# Patient Record
Sex: FEMALE | Race: WHITE | NOT HISPANIC OR LATINO | Employment: FULL TIME | URBAN - NONMETROPOLITAN AREA
[De-identification: names, ages, dates, MRNs, and addresses within clinical notes are randomized per-mention and may not be internally consistent; named-entity substitution may affect disease eponyms.]

---

## 2017-03-22 ENCOUNTER — APPOINTMENT (OUTPATIENT)
Dept: GENERAL RADIOLOGY | Facility: HOSPITAL | Age: 47
End: 2017-03-22

## 2017-03-22 ENCOUNTER — APPOINTMENT (OUTPATIENT)
Dept: CT IMAGING | Facility: HOSPITAL | Age: 47
End: 2017-03-22

## 2017-03-22 ENCOUNTER — HOSPITAL ENCOUNTER (EMERGENCY)
Facility: HOSPITAL | Age: 47
Discharge: HOME | End: 2017-03-22
Attending: EMERGENCY MEDICINE | Admitting: FAMILY MEDICINE

## 2017-03-22 VITALS
DIASTOLIC BLOOD PRESSURE: 58 MMHG | HEIGHT: 64 IN | SYSTOLIC BLOOD PRESSURE: 126 MMHG | HEART RATE: 57 BPM | RESPIRATION RATE: 16 BRPM | TEMPERATURE: 97.6 F | WEIGHT: 157.13 LBS | BODY MASS INDEX: 26.82 KG/M2 | OXYGEN SATURATION: 94 %

## 2017-03-22 DIAGNOSIS — K21.00 GASTROESOPHAGEAL REFLUX DISEASE WITH ESOPHAGITIS: ICD-10-CM

## 2017-03-22 DIAGNOSIS — R10.12 LEFT UPPER QUADRANT PAIN: Primary | ICD-10-CM

## 2017-03-22 LAB
ALBUMIN SERPL-MCNC: 4.4 G/DL (ref 3.4–4.8)
ALBUMIN/GLOB SERPL: 1.4 G/DL (ref 1.1–1.8)
ALP SERPL-CCNC: 91 U/L (ref 38–126)
ALT SERPL W P-5'-P-CCNC: 21 U/L (ref 9–52)
ANION GAP SERPL CALCULATED.3IONS-SCNC: 13 MMOL/L (ref 5–15)
AST SERPL-CCNC: 30 U/L (ref 14–36)
BASOPHILS # BLD AUTO: 0.04 10*3/MM3 (ref 0–0.2)
BASOPHILS NFR BLD AUTO: 0.3 % (ref 0–2)
BILIRUB SERPL-MCNC: 0.4 MG/DL (ref 0.2–1.3)
BILIRUB UR QL STRIP: NEGATIVE
BUN BLD-MCNC: 12 MG/DL (ref 7–21)
BUN/CREAT SERPL: 16.7 (ref 7–25)
CALCIUM SPEC-SCNC: 9.8 MG/DL (ref 8.4–10.2)
CHLORIDE SERPL-SCNC: 107 MMOL/L (ref 95–110)
CLARITY UR: CLEAR
CO2 SERPL-SCNC: 22 MMOL/L (ref 22–31)
COLOR UR: YELLOW
CREAT BLD-MCNC: 0.72 MG/DL (ref 0.5–1)
DEPRECATED RDW RBC AUTO: 45.3 FL (ref 36.4–46.3)
EOSINOPHIL # BLD AUTO: 0.29 10*3/MM3 (ref 0–0.7)
EOSINOPHIL NFR BLD AUTO: 2.1 % (ref 0–7)
ERYTHROCYTE [DISTWIDTH] IN BLOOD BY AUTOMATED COUNT: 13.4 % (ref 11.5–14.5)
GFR SERPL CREATININE-BSD FRML MDRD: 88 ML/MIN/1.73 (ref 60–135)
GLOBULIN UR ELPH-MCNC: 3.2 GM/DL (ref 2.3–3.5)
GLUCOSE BLD-MCNC: 99 MG/DL (ref 60–100)
GLUCOSE UR STRIP-MCNC: NEGATIVE MG/DL
HCT VFR BLD AUTO: 46.8 % (ref 35–45)
HGB BLD-MCNC: 16.6 G/DL (ref 12–15.5)
HGB UR QL STRIP.AUTO: NEGATIVE
HOLD SPECIMEN: NORMAL
IMM GRANULOCYTES # BLD: 0.04 10*3/MM3 (ref 0–0.02)
IMM GRANULOCYTES NFR BLD: 0.3 % (ref 0–0.5)
KETONES UR QL STRIP: ABNORMAL
LEUKOCYTE ESTERASE UR QL STRIP.AUTO: NEGATIVE
LIPASE SERPL-CCNC: 94 U/L (ref 23–300)
LYMPHOCYTES # BLD AUTO: 3.76 10*3/MM3 (ref 0.6–4.2)
LYMPHOCYTES NFR BLD AUTO: 27.6 % (ref 10–50)
MCH RBC QN AUTO: 32.6 PG (ref 26.5–34)
MCHC RBC AUTO-ENTMCNC: 35.5 G/DL (ref 31.4–36)
MCV RBC AUTO: 91.9 FL (ref 80–98)
MONOCYTES # BLD AUTO: 0.64 10*3/MM3 (ref 0–0.9)
MONOCYTES NFR BLD AUTO: 4.7 % (ref 0–12)
NEUTROPHILS # BLD AUTO: 8.87 10*3/MM3 (ref 2–8.6)
NEUTROPHILS NFR BLD AUTO: 65 % (ref 37–80)
NITRITE UR QL STRIP: NEGATIVE
PH UR STRIP.AUTO: 7 [PH] (ref 5–9)
PLATELET # BLD AUTO: 254 10*3/MM3 (ref 150–450)
PMV BLD AUTO: 11 FL (ref 8–12)
POTASSIUM BLD-SCNC: 3.8 MMOL/L (ref 3.5–5.1)
PROT SERPL-MCNC: 7.6 G/DL (ref 6.3–8.6)
PROT UR QL STRIP: NEGATIVE
RBC # BLD AUTO: 5.09 10*6/MM3 (ref 3.77–5.16)
SODIUM BLD-SCNC: 142 MMOL/L (ref 137–145)
SP GR UR STRIP: 1.02 (ref 1–1.03)
UROBILINOGEN UR QL STRIP: ABNORMAL
WBC NRBC COR # BLD: 13.64 10*3/MM3 (ref 3.2–9.8)
WHOLE BLOOD HOLD SPECIMEN: NORMAL

## 2017-03-22 PROCEDURE — 74022 RADEX COMPL AQT ABD SERIES: CPT

## 2017-03-22 PROCEDURE — 25010000002 PROMETHAZINE PER 50 MG: Performed by: FAMILY MEDICINE

## 2017-03-22 PROCEDURE — 25010000002 ONDANSETRON PER 1 MG: Performed by: EMERGENCY MEDICINE

## 2017-03-22 PROCEDURE — 36415 COLL VENOUS BLD VENIPUNCTURE: CPT

## 2017-03-22 PROCEDURE — 80053 COMPREHEN METABOLIC PANEL: CPT | Performed by: EMERGENCY MEDICINE

## 2017-03-22 PROCEDURE — 74176 CT ABD & PELVIS W/O CONTRAST: CPT

## 2017-03-22 PROCEDURE — 96376 TX/PRO/DX INJ SAME DRUG ADON: CPT

## 2017-03-22 PROCEDURE — 99284 EMERGENCY DEPT VISIT MOD MDM: CPT

## 2017-03-22 PROCEDURE — 25010000002 HYDROMORPHONE PER 4 MG: Performed by: EMERGENCY MEDICINE

## 2017-03-22 PROCEDURE — 96361 HYDRATE IV INFUSION ADD-ON: CPT

## 2017-03-22 PROCEDURE — 85025 COMPLETE CBC W/AUTO DIFF WBC: CPT | Performed by: EMERGENCY MEDICINE

## 2017-03-22 PROCEDURE — 83690 ASSAY OF LIPASE: CPT | Performed by: EMERGENCY MEDICINE

## 2017-03-22 PROCEDURE — 96375 TX/PRO/DX INJ NEW DRUG ADDON: CPT

## 2017-03-22 PROCEDURE — 25010000002 MORPHINE PER 10 MG: Performed by: FAMILY MEDICINE

## 2017-03-22 PROCEDURE — 81003 URINALYSIS AUTO W/O SCOPE: CPT | Performed by: EMERGENCY MEDICINE

## 2017-03-22 PROCEDURE — 96374 THER/PROPH/DIAG INJ IV PUSH: CPT

## 2017-03-22 RX ORDER — PENTAZOCINE HYDROCHLORIDE AND NALOXONE HYDROCHLORIDE 50; .5 MG/1; MG/1
1 TABLET ORAL EVERY 4 HOURS PRN
COMMUNITY
End: 2017-11-16

## 2017-03-22 RX ORDER — PANTOPRAZOLE SODIUM 20 MG/1
20 TABLET, DELAYED RELEASE ORAL DAILY
COMMUNITY
End: 2020-06-03

## 2017-03-22 RX ORDER — MORPHINE SULFATE 4 MG/ML
4 INJECTION, SOLUTION INTRAMUSCULAR; INTRAVENOUS ONCE
Status: COMPLETED | OUTPATIENT
Start: 2017-03-22 | End: 2017-03-22

## 2017-03-22 RX ORDER — PANTOPRAZOLE SODIUM 40 MG/10ML
40 INJECTION, POWDER, LYOPHILIZED, FOR SOLUTION INTRAVENOUS ONCE
Status: COMPLETED | OUTPATIENT
Start: 2017-03-22 | End: 2017-03-22

## 2017-03-22 RX ORDER — SODIUM CHLORIDE 0.9 % (FLUSH) 0.9 %
10 SYRINGE (ML) INJECTION AS NEEDED
Status: DISCONTINUED | OUTPATIENT
Start: 2017-03-22 | End: 2017-03-22 | Stop reason: HOSPADM

## 2017-03-22 RX ORDER — ONDANSETRON 4 MG/1
4 TABLET, FILM COATED ORAL EVERY 6 HOURS PRN
Qty: 10 TABLET | Refills: 0 | Status: SHIPPED | OUTPATIENT
Start: 2017-03-22

## 2017-03-22 RX ORDER — SODIUM CHLORIDE 9 MG/ML
125 INJECTION, SOLUTION INTRAVENOUS CONTINUOUS
Status: DISCONTINUED | OUTPATIENT
Start: 2017-03-22 | End: 2017-03-22 | Stop reason: HOSPADM

## 2017-03-22 RX ORDER — MAGNESIUM HYDROXIDE/ALUMINUM HYDROXICE/SIMETHICONE 120; 1200; 1200 MG/30ML; MG/30ML; MG/30ML
30 SUSPENSION ORAL ONCE
Status: COMPLETED | OUTPATIENT
Start: 2017-03-22 | End: 2017-03-22

## 2017-03-22 RX ORDER — PROMETHAZINE HYDROCHLORIDE 25 MG/ML
12.5 INJECTION, SOLUTION INTRAMUSCULAR; INTRAVENOUS ONCE
Status: COMPLETED | OUTPATIENT
Start: 2017-03-22 | End: 2017-03-22

## 2017-03-22 RX ORDER — ONDANSETRON 2 MG/ML
4 INJECTION INTRAMUSCULAR; INTRAVENOUS ONCE
Status: COMPLETED | OUTPATIENT
Start: 2017-03-22 | End: 2017-03-22

## 2017-03-22 RX ADMIN — HYDROMORPHONE HYDROCHLORIDE 1 MG: 1 INJECTION, SOLUTION INTRAMUSCULAR; INTRAVENOUS; SUBCUTANEOUS at 04:54

## 2017-03-22 RX ADMIN — MORPHINE SULFATE 4 MG: 4 INJECTION, SOLUTION INTRAMUSCULAR; INTRAVENOUS at 08:22

## 2017-03-22 RX ADMIN — LIDOCAINE HYDROCHLORIDE 15 ML: 20 SOLUTION ORAL; TOPICAL at 08:21

## 2017-03-22 RX ADMIN — PANTOPRAZOLE SODIUM 40 MG: 40 INJECTION, POWDER, FOR SOLUTION INTRAVENOUS at 04:57

## 2017-03-22 RX ADMIN — PROMETHAZINE HYDROCHLORIDE 12.5 MG: 25 INJECTION, SOLUTION INTRAMUSCULAR; INTRAVENOUS at 08:22

## 2017-03-22 RX ADMIN — Medication 10 ML: at 05:00

## 2017-03-22 RX ADMIN — ALUMINUM HYDROXIDE, MAGNESIUM HYDROXIDE, AND SIMETHICONE 30 ML: 200; 200; 20 SUSPENSION ORAL at 08:21

## 2017-03-22 RX ADMIN — ONDANSETRON 4 MG: 2 INJECTION INTRAMUSCULAR; INTRAVENOUS at 04:56

## 2017-03-22 RX ADMIN — HYDROMORPHONE HYDROCHLORIDE 1 MG: 1 INJECTION, SOLUTION INTRAMUSCULAR; INTRAVENOUS; SUBCUTANEOUS at 07:09

## 2017-03-22 RX ADMIN — SODIUM CHLORIDE 125 ML/HR: 9 INJECTION, SOLUTION INTRAVENOUS at 04:52

## 2017-03-22 NOTE — ED PROVIDER NOTES
Subjective   History of Present Illness    Review of Systems    Past Medical History:   Diagnosis Date   • Abnormal granulation tissue    • Acute bacterial pharyngitis    • Acute tonsillitis, unspecified    • Allergic asthma     IgE-mediated allergic asthma      • Allergic rhinitis due to pollen    • Anxiety    • Asthma    • Back pain    • Backache    • Breast lump    • Cellulitis of trunk     Postoperative   • Chronic pain    • Corns and callus    • Cough    • Depressive disorder    • Diarrhea    • Dysphagia    • Dysuria    • Endometriosis    • Family history of malignant neoplasm of gastrointestinal tract    • GERD (gastroesophageal reflux disease)    • Hirsutism    • Hypertension    • Irritable bowel syndrome    • Leukorrhea, not specified as infective    • Mastitis    • Migraine    • Sebaceous cyst    • Shortness of breath    • Tick bite    • Tobacco user    • Upper respiratory infection        Allergies   Allergen Reactions   • Naproxen Rash   • Penicillins Rash   • Sulfur Rash   • Tetracyclines & Related Rash       Past Surgical History:   Procedure Laterality Date   • DIAGNOSTIC LAPAROSCOPY  04/08/2008    Laparosc diagnostic (2)      • ENDOSCOPY  03/26/2013    Colon endoscopy 36808 (2)      • ENDOSCOPY W/ PEG TUBE PLACEMENT  03/26/2013    EGD w/ tube 10251 (2)      • EXCISION LESION  01/30/2013    EXC TR-EXT Benign+Irina 0.6-1 CM 61823 (1)      • HAND TENOLYSIS  07/23/1992    Hand/finger surgery (1)      • HEAD & NECK SKIN LESION EXCISIONAL BIOPSY  01/31/1994    Biopsy of Skin 76509 (2)      • INCISION AND DRAINAGE BREAST ABSCESS  12/03/2012    Anesth, surgery of breast (1)      • INJECTION OF MEDICATION  09/12/2011    B12 (1)      • INJECTION OF MEDICATION  04/28/2015    Celestone (betamethasone) (1)     • INJECTION OF MEDICATION  08/17/2015    Depo Medrol (Methylprednisone) (6)      • INJECTION OF MEDICATION  06/08/2012    Kenalog (3)      • INJECTION OF MEDICATION  06/08/2012    Toradol (1)      •  NASOLACRIMAL DUCT PROBING  08/16/1973    Probe nasolacrimal duct (1)      • NEPHROSTOMY TRACT DILATATION W/ LITHOTRIPSY     • PAP SMEAR  03/20/2008    PAP SMEAR (1)      • TOTAL ABDOMINAL HYSTERECTOMY WITH SALPINGO OOPHORECTOMY  04/09/2012    ERLIN/BSO (1)          Family History   Problem Relation Age of Onset   • Cancer Other      other   • Colon cancer Other      Colorectal Cancer   • Diabetes Other    • Heart disease Other    • Hypertension Other    • Lung cancer Other        Social History     Social History   • Marital status:      Spouse name: N/A   • Number of children: N/A   • Years of education: N/A     Social History Main Topics   • Smoking status: Current Every Day Smoker     Packs/day: 1.00   • Smokeless tobacco: None   • Alcohol use Yes      Comment: Pt states she drinks less than once a month   • Drug use: No   • Sexual activity: Not Asked     Other Topics Concern   • None     Social History Narrative   • None           Objective   Physical Exam    Procedures         ED Course  ED Course   Comment By Time   Checkout Dr. Jain. Pending CT abd/pelvis. Júnior Blanchard MD 03/22 7208   Request # : [id number] Siddharth Jain MD 03/22 1131          Labs Reviewed   URINALYSIS W/ CULTURE IF INDICATED - Abnormal; Notable for the following:        Result Value    Ketones, UA Trace (*)     All other components within normal limits    Narrative:     Urine microscopic not indicated.   CBC WITH AUTO DIFFERENTIAL - Abnormal; Notable for the following:     WBC 13.64 (*)     Hemoglobin 16.6 (*)     Hematocrit 46.8 (*)     Neutrophils, Absolute 8.87 (*)     Immature Grans, Absolute 0.04 (*)     All other components within normal limits   COMPREHENSIVE METABOLIC PANEL - Normal   LIPASE - Normal   RAINBOW DRAW    Narrative:     The following orders were created for panel order [place] Draw.  Procedure                               Abnormality         Status                     ---------                                -----------         ------                     Light Blue Top[[id number]]                                    Final result               Gold Top - SST[[id number]]                                    Final result                 Please view results for these tests on the individual orders.   CBC AND DIFFERENTIAL    Narrative:     The following orders were created for panel order CBC & Differential.  Procedure                               Abnormality         Status                     ---------                               -----------         ------                     CBC Auto Differential[[id number]]         Abnormal            Final result                 Please view results for these tests on the individual orders.   LIGHT BLUE TOP   GOLD TOP - SST       CT Abdomen Pelvis Without Contrast   Final Result   CONCLUSION: Significant amount of retained oral contrast in the   esophagus. This suggests esophageal pathology such as reflux.      Small nonobstructing calculus upper pole left kidney. Kidneys   otherwise unremarkable.      Evidence of prior cholecystectomy, and hysterectomy. CT abdomen,   pelvis without contrast is otherwise unremarkable.          Electronically signed by:  Ruben Freeman MD  3/22/2017 9:21 AM CDT   Workstation: [id number]      XR Abdomen 2 View With Chest 1 View   Final Result   No obvious acute abnormality.      Electronically signed by:  Wagner Pham MD  3/22/2017 5:23 AM   CDT Workstation: [id number]                    MDM    Final diagnoses:   Left upper quadrant pain   Gastroesophageal reflux disease with esophagitis            Siddharth Jain MD  03/22/17 3056

## 2017-03-22 NOTE — ED NOTES
Late entry: Pt states she has left sided abdominal pain that has been going on for 3 hours. Pt also mentioned that she has a contract with her doctor concerning pain medication.     Franco Kulkarni, UMANG  03/22/17 9821

## 2017-03-22 NOTE — ED NOTES
Pt not currently in pain and was sleeping upon entering the room to be discharged. Pt's spouse is the  home.      Denisha Gu RN  03/22/17 8894

## 2017-03-22 NOTE — ED PROVIDER NOTES
Subjective   HPI Comments: 48yo female presents ED c/o onset LUQ abdominal pain 2400hrs    Patient is a 47 y.o. female presenting with abdominal pain.   Abdominal Pain   Pain location:  LUQ and L flank  Pain quality: sharp    Pain severity:  Moderate  Onset quality:  Sudden  Duration:  6 hours  Timing:  Constant  Chronicity:  New  Relieved by:  Nothing  Worsened by:  Position changes  Ineffective treatments:  None tried  Associated symptoms: constipation and nausea    Associated symptoms: no cough, no dysuria, no fever, no hematemesis, no hematochezia, no hematuria, no melena and no vomiting        Review of Systems   Constitutional: Negative for fever.   Respiratory: Negative for cough.    Gastrointestinal: Positive for abdominal pain, constipation and nausea. Negative for hematemesis, hematochezia, melena and vomiting.   Genitourinary: Negative for dysuria and hematuria.       Past Medical History:   Diagnosis Date   • Abnormal granulation tissue    • Acute bacterial pharyngitis    • Acute tonsillitis, unspecified    • Allergic asthma     IgE-mediated allergic asthma      • Allergic rhinitis due to pollen    • Anxiety    • Asthma    • Back pain    • Backache    • Breast lump    • Cellulitis of trunk     Postoperative   • Chronic pain    • Corns and callus    • Cough    • Depressive disorder    • Diarrhea    • Dysphagia    • Dysuria    • Endometriosis    • Family history of malignant neoplasm of gastrointestinal tract    • GERD (gastroesophageal reflux disease)    • Hirsutism    • Hypertension    • Irritable bowel syndrome    • Leukorrhea, not specified as infective    • Mastitis    • Migraine    • Sebaceous cyst    • Shortness of breath    • Tick bite    • Tobacco user    • Upper respiratory infection        Allergies   Allergen Reactions   • Naproxen Rash   • Penicillins Rash   • Sulfur Rash   • Tetracyclines & Related Rash       Past Surgical History:   Procedure Laterality Date   • DIAGNOSTIC LAPAROSCOPY   04/08/2008    Laparosc diagnostic (2)      • ENDOSCOPY  03/26/2013    Colon endoscopy 01411 (2)      • ENDOSCOPY W/ PEG TUBE PLACEMENT  03/26/2013    EGD w/ tube 09540 (2)      • EXCISION LESION  01/30/2013    EXC TR-EXT Benign+Irina 0.6-1 CM 97911 (1)      • HAND TENOLYSIS  07/23/1992    Hand/finger surgery (1)      • HEAD & NECK SKIN LESION EXCISIONAL BIOPSY  01/31/1994    Biopsy of Skin 70480 (2)      • INCISION AND DRAINAGE BREAST ABSCESS  12/03/2012    Anesth, surgery of breast (1)      • INJECTION OF MEDICATION  09/12/2011    B12 (1)      • INJECTION OF MEDICATION  04/28/2015    Celestone (betamethasone) (1)     • INJECTION OF MEDICATION  08/17/2015    Depo Medrol (Methylprednisone) (6)      • INJECTION OF MEDICATION  06/08/2012    Kenalog (3)      • INJECTION OF MEDICATION  06/08/2012    Toradol (1)      • NASOLACRIMAL DUCT PROBING  08/16/1973    Probe nasolacrimal duct (1)      • NEPHROSTOMY TRACT DILATATION W/ LITHOTRIPSY     • PAP SMEAR  03/20/2008    PAP SMEAR (1)      • TOTAL ABDOMINAL HYSTERECTOMY WITH SALPINGO OOPHORECTOMY  04/09/2012    ERLIN/BSO (1)          Family History   Problem Relation Age of Onset   • Cancer Other      other   • Colon cancer Other      Colorectal Cancer   • Diabetes Other    • Heart disease Other    • Hypertension Other    • Lung cancer Other        Social History     Social History   • Marital status:      Spouse name: N/A   • Number of children: N/A   • Years of education: N/A     Social History Main Topics   • Smoking status: Current Every Day Smoker     Packs/day: 1.00   • Smokeless tobacco: None   • Alcohol use Yes      Comment: Pt states she drinks less than once a month   • Drug use: No   • Sexual activity: Not Asked     Other Topics Concern   • None     Social History Narrative   • None           Objective   Physical Exam   Constitutional: She appears well-developed and well-nourished.   HENT:   Head: Normocephalic and atraumatic.   Mouth/Throat: Oropharynx is  clear and moist.   Eyes: Pupils are equal, round, and reactive to light.   Neck: Neck supple. No JVD present. No tracheal deviation present.   Cardiovascular: Normal rate, regular rhythm, normal heart sounds and intact distal pulses.  Exam reveals no gallop and no friction rub.    No murmur heard.  Pulmonary/Chest: Effort normal and breath sounds normal. She has no wheezes. She has no rales.   Abdominal: Soft. Normal appearance and bowel sounds are normal. There is no hepatosplenomegaly. There is tenderness in the left upper quadrant. There is no rigidity, no rebound, no guarding, no CVA tenderness, no tenderness at McBurney's point and negative Mike's sign.   Genitourinary: Rectal exam shows guaiac negative stool.   Lymphadenopathy:     She has no cervical adenopathy.   Skin: Skin is warm and dry.   Nursing note and vitals reviewed.      Procedures         ED Course  ED Course                  MDM    Final diagnoses:   None            Júnior Blanchard MD  03/22/17 0608

## 2017-06-11 ENCOUNTER — HOSPITAL ENCOUNTER (OUTPATIENT)
Facility: HOSPITAL | Age: 47
Setting detail: OBSERVATION
Discharge: HOME | End: 2017-06-13
Attending: FAMILY MEDICINE | Admitting: INTERNAL MEDICINE

## 2017-06-11 ENCOUNTER — APPOINTMENT (OUTPATIENT)
Dept: GENERAL RADIOLOGY | Facility: HOSPITAL | Age: 47
End: 2017-06-11

## 2017-06-11 ENCOUNTER — APPOINTMENT (OUTPATIENT)
Dept: CT IMAGING | Facility: HOSPITAL | Age: 47
End: 2017-06-11

## 2017-06-11 DIAGNOSIS — R51.9 ACUTE INTRACTABLE HEADACHE, UNSPECIFIED HEADACHE TYPE: Primary | ICD-10-CM

## 2017-06-11 DIAGNOSIS — I10 ESSENTIAL HYPERTENSION: ICD-10-CM

## 2017-06-11 LAB
ALBUMIN SERPL-MCNC: 4.1 G/DL (ref 3.4–4.8)
ALBUMIN/GLOB SERPL: 1.2 G/DL (ref 1.1–1.8)
ALP SERPL-CCNC: 70 U/L (ref 38–126)
ALT SERPL W P-5'-P-CCNC: 32 U/L (ref 9–52)
AMPHET+METHAMPHET UR QL: NEGATIVE
ANION GAP SERPL CALCULATED.3IONS-SCNC: 11 MMOL/L (ref 5–15)
ANION GAP SERPL CALCULATED.3IONS-SCNC: 13 MMOL/L (ref 5–15)
AST SERPL-CCNC: 15 U/L (ref 14–36)
BARBITURATES UR QL SCN: NEGATIVE
BASOPHILS # BLD AUTO: 0.03 10*3/MM3 (ref 0–0.2)
BASOPHILS NFR BLD AUTO: 0.3 % (ref 0–2)
BENZODIAZ UR QL SCN: NEGATIVE
BILIRUB SERPL-MCNC: 0.4 MG/DL (ref 0.2–1.3)
BILIRUB UR QL STRIP: NEGATIVE
BUN BLD-MCNC: 9 MG/DL (ref 7–21)
BUN BLD-MCNC: 9 MG/DL (ref 7–21)
BUN/CREAT SERPL: 12.7 (ref 7–25)
BUN/CREAT SERPL: 13 (ref 7–25)
CALCIUM SPEC-SCNC: 9.3 MG/DL (ref 8.4–10.2)
CALCIUM SPEC-SCNC: 9.4 MG/DL (ref 8.4–10.2)
CANNABINOIDS SERPL QL: NEGATIVE
CHLORIDE SERPL-SCNC: 108 MMOL/L (ref 95–110)
CHLORIDE SERPL-SCNC: 108 MMOL/L (ref 95–110)
CK MB SERPL-CCNC: 1.1 NG/ML (ref 0–5)
CK SERPL-CCNC: 58 U/L (ref 30–135)
CLARITY UR: ABNORMAL
CO2 SERPL-SCNC: 20 MMOL/L (ref 22–31)
CO2 SERPL-SCNC: 20 MMOL/L (ref 22–31)
COCAINE UR QL: NEGATIVE
COLOR UR: YELLOW
CREAT BLD-MCNC: 0.69 MG/DL (ref 0.5–1)
CREAT BLD-MCNC: 0.71 MG/DL (ref 0.5–1)
DEPRECATED RDW RBC AUTO: 48.1 FL (ref 36.4–46.3)
EOSINOPHIL # BLD AUTO: 0.25 10*3/MM3 (ref 0–0.7)
EOSINOPHIL NFR BLD AUTO: 2.2 % (ref 0–7)
ERYTHROCYTE [DISTWIDTH] IN BLOOD BY AUTOMATED COUNT: 14.1 % (ref 11.5–14.5)
GFR SERPL CREATININE-BSD FRML MDRD: 89 ML/MIN/1.73 (ref 58–135)
GFR SERPL CREATININE-BSD FRML MDRD: 92 ML/MIN/1.73 (ref 60–135)
GLOBULIN UR ELPH-MCNC: 3.3 GM/DL (ref 2.3–3.5)
GLUCOSE BLD-MCNC: 128 MG/DL (ref 60–100)
GLUCOSE BLD-MCNC: 98 MG/DL (ref 60–100)
GLUCOSE UR STRIP-MCNC: NEGATIVE MG/DL
HCT VFR BLD AUTO: 45.8 % (ref 35–45)
HGB BLD-MCNC: 15.7 G/DL (ref 12–15.5)
HGB UR QL STRIP.AUTO: NEGATIVE
HOLD SPECIMEN: NORMAL
HOLD SPECIMEN: NORMAL
IMM GRANULOCYTES # BLD: 0.03 10*3/MM3 (ref 0–0.02)
IMM GRANULOCYTES NFR BLD: 0.3 % (ref 0–0.5)
KETONES UR QL STRIP: NEGATIVE
LEUKOCYTE ESTERASE UR QL STRIP.AUTO: NEGATIVE
LYMPHOCYTES # BLD AUTO: 4.7 10*3/MM3 (ref 0.6–4.2)
LYMPHOCYTES NFR BLD AUTO: 41.9 % (ref 10–50)
MCH RBC QN AUTO: 32 PG (ref 26.5–34)
MCHC RBC AUTO-ENTMCNC: 34.3 G/DL (ref 31.4–36)
MCV RBC AUTO: 93.3 FL (ref 80–98)
METHADONE UR QL SCN: NEGATIVE
MONOCYTES # BLD AUTO: 0.63 10*3/MM3 (ref 0–0.9)
MONOCYTES NFR BLD AUTO: 5.6 % (ref 0–12)
NEUTROPHILS # BLD AUTO: 5.58 10*3/MM3 (ref 2–8.6)
NEUTROPHILS NFR BLD AUTO: 49.7 % (ref 37–80)
NITRITE UR QL STRIP: NEGATIVE
NRBC BLD MANUAL-RTO: 0 /100 WBC (ref 0–0)
OPIATES UR QL: POSITIVE
OXYCODONE UR QL SCN: NEGATIVE
PH UR STRIP.AUTO: 8 [PH] (ref 5–9)
PLATELET # BLD AUTO: 241 10*3/MM3 (ref 150–450)
PMV BLD AUTO: 10.7 FL (ref 8–12)
POTASSIUM BLD-SCNC: 3.8 MMOL/L (ref 3.5–5.1)
POTASSIUM BLD-SCNC: 4.1 MMOL/L (ref 3.5–5.1)
PROT SERPL-MCNC: 7.4 G/DL (ref 6.3–8.6)
PROT UR QL STRIP: NEGATIVE
RBC # BLD AUTO: 4.91 10*6/MM3 (ref 3.77–5.16)
SODIUM BLD-SCNC: 139 MMOL/L (ref 137–145)
SODIUM BLD-SCNC: 141 MMOL/L (ref 137–145)
SP GR UR STRIP: 1.01 (ref 1–1.03)
TROPONIN I SERPL-MCNC: <0.012 NG/ML
UROBILINOGEN UR QL STRIP: ABNORMAL
WBC NRBC COR # BLD: 11.22 10*3/MM3 (ref 3.2–9.8)
WHOLE BLOOD HOLD SPECIMEN: NORMAL
WHOLE BLOOD HOLD SPECIMEN: NORMAL

## 2017-06-11 PROCEDURE — 80307 DRUG TEST PRSMV CHEM ANLYZR: CPT | Performed by: NURSE PRACTITIONER

## 2017-06-11 PROCEDURE — 99284 EMERGENCY DEPT VISIT MOD MDM: CPT

## 2017-06-11 PROCEDURE — 80053 COMPREHEN METABOLIC PANEL: CPT | Performed by: NURSE PRACTITIONER

## 2017-06-11 PROCEDURE — 93005 ELECTROCARDIOGRAM TRACING: CPT | Performed by: NURSE PRACTITIONER

## 2017-06-11 PROCEDURE — 71010 HC CHEST PA OR AP: CPT

## 2017-06-11 PROCEDURE — 81003 URINALYSIS AUTO W/O SCOPE: CPT | Performed by: FAMILY MEDICINE

## 2017-06-11 PROCEDURE — 87040 BLOOD CULTURE FOR BACTERIA: CPT | Performed by: NURSE PRACTITIONER

## 2017-06-11 PROCEDURE — 85025 COMPLETE CBC W/AUTO DIFF WBC: CPT | Performed by: NURSE PRACTITIONER

## 2017-06-11 PROCEDURE — G0378 HOSPITAL OBSERVATION PER HR: HCPCS

## 2017-06-11 PROCEDURE — 93010 ELECTROCARDIOGRAM REPORT: CPT | Performed by: INTERNAL MEDICINE

## 2017-06-11 PROCEDURE — 84484 ASSAY OF TROPONIN QUANT: CPT | Performed by: NURSE PRACTITIONER

## 2017-06-11 PROCEDURE — 82550 ASSAY OF CK (CPK): CPT | Performed by: NURSE PRACTITIONER

## 2017-06-11 PROCEDURE — 80048 BASIC METABOLIC PNL TOTAL CA: CPT | Performed by: NURSE PRACTITIONER

## 2017-06-11 PROCEDURE — 70450 CT HEAD/BRAIN W/O DYE: CPT

## 2017-06-11 PROCEDURE — 82553 CREATINE MB FRACTION: CPT | Performed by: NURSE PRACTITIONER

## 2017-06-11 RX ORDER — ASPIRIN 81 MG/1
81 TABLET, CHEWABLE ORAL DAILY
Status: DISCONTINUED | OUTPATIENT
Start: 2017-06-11 | End: 2017-06-13 | Stop reason: HOSPADM

## 2017-06-11 RX ORDER — HYDROCODONE BITARTRATE AND ACETAMINOPHEN 10; 325 MG/1; MG/1
1 TABLET ORAL ONCE
Status: COMPLETED | OUTPATIENT
Start: 2017-06-11 | End: 2017-06-11

## 2017-06-11 RX ORDER — SODIUM CHLORIDE 0.9 % (FLUSH) 0.9 %
1-10 SYRINGE (ML) INJECTION AS NEEDED
Status: DISCONTINUED | OUTPATIENT
Start: 2017-06-11 | End: 2017-06-13 | Stop reason: HOSPADM

## 2017-06-11 RX ORDER — ACETAMINOPHEN 325 MG/1
650 TABLET ORAL EVERY 4 HOURS PRN
Status: DISCONTINUED | OUTPATIENT
Start: 2017-06-11 | End: 2017-06-13 | Stop reason: HOSPADM

## 2017-06-11 RX ORDER — ACETAMINOPHEN 650 MG/1
650 SUPPOSITORY RECTAL EVERY 4 HOURS PRN
Status: DISCONTINUED | OUTPATIENT
Start: 2017-06-11 | End: 2017-06-13 | Stop reason: HOSPADM

## 2017-06-11 RX ORDER — ATORVASTATIN CALCIUM 40 MG/1
80 TABLET, FILM COATED ORAL NIGHTLY
Status: DISCONTINUED | OUTPATIENT
Start: 2017-06-11 | End: 2017-06-13 | Stop reason: HOSPADM

## 2017-06-11 RX ORDER — NICOTINE 21 MG/24HR
1 PATCH, TRANSDERMAL 24 HOURS TRANSDERMAL EVERY 24 HOURS
Status: DISCONTINUED | OUTPATIENT
Start: 2017-06-11 | End: 2017-06-13 | Stop reason: HOSPADM

## 2017-06-11 RX ORDER — ONDANSETRON 4 MG/1
4 TABLET, FILM COATED ORAL EVERY 6 HOURS PRN
Status: DISCONTINUED | OUTPATIENT
Start: 2017-06-11 | End: 2017-06-13 | Stop reason: HOSPADM

## 2017-06-11 RX ORDER — ASPIRIN 300 MG/1
300 SUPPOSITORY RECTAL DAILY
Status: DISCONTINUED | OUTPATIENT
Start: 2017-06-11 | End: 2017-06-12

## 2017-06-11 RX ORDER — SODIUM CHLORIDE 0.9 % (FLUSH) 0.9 %
10 SYRINGE (ML) INJECTION AS NEEDED
Status: DISCONTINUED | OUTPATIENT
Start: 2017-06-11 | End: 2017-06-13 | Stop reason: HOSPADM

## 2017-06-11 RX ORDER — ACETAMINOPHEN 325 MG/1
650 TABLET ORAL EVERY 4 HOURS PRN
Status: DISCONTINUED | OUTPATIENT
Start: 2017-06-11 | End: 2017-06-12

## 2017-06-11 RX ADMIN — ATORVASTATIN CALCIUM 80 MG: 40 TABLET, FILM COATED ORAL at 23:11

## 2017-06-11 RX ADMIN — HYDROCODONE BITARTRATE AND ACETAMINOPHEN 1 TABLET: 10; 325 TABLET ORAL at 19:12

## 2017-06-11 RX ADMIN — ASPIRIN 81 MG 81 MG: 81 TABLET ORAL at 23:11

## 2017-06-11 RX ADMIN — NICOTINE 1 PATCH: 21 PATCH TRANSDERMAL at 23:11

## 2017-06-11 RX ADMIN — ONDANSETRON 4 MG: 4 TABLET, FILM COATED ORAL at 23:18

## 2017-06-11 NOTE — ED PROVIDER NOTES
Subjective   HPI Comments: Pt reports to ED c/o left side neck pain that radiates to her head, her  reports that yesterday she c/o of headache and her blood pressure fluctuating. She went to bed last night normal but hypertensive. Today she was seen at urgent care and sent to ED. Spouse at bedside reports that she is slow to answer questions but they are accurate. For example she normally can quickly recall her med list and today she is slow to answer.     Patient is a 47 y.o. female presenting with headaches.   Headache   Pain location:  L parietal and L temporal  Radiates to:  L neck  Severity currently:  7/10  Onset quality:  Gradual  Timing:  Constant  Progression:  Partially resolved  Chronicity:  Recurrent  Similar to prior headaches: no    Associated symptoms: neck pain    Associated symptoms: no ear pain, no fatigue, no fever, no sinus pressure and no sore throat        Review of Systems   Constitutional: Negative.  Negative for chills, fatigue and fever.   HENT: Negative for drooling, ear pain, sinus pressure, sore throat, tinnitus and trouble swallowing.    Respiratory: Negative for chest tightness, shortness of breath and wheezing.    Cardiovascular: Negative for chest pain and palpitations.   Gastrointestinal: Negative.    Endocrine: Negative.    Genitourinary: Negative.    Musculoskeletal: Positive for neck pain. Negative for arthralgias and joint swelling.   Neurological: Positive for headaches.   Hematological: Negative.    Psychiatric/Behavioral: Positive for decreased concentration. Negative for confusion, hallucinations, self-injury and sleep disturbance. The patient is not nervous/anxious.    All other systems reviewed and are negative.      Past Medical History:   Diagnosis Date   • Abnormal granulation tissue    • Acute bacterial pharyngitis    • Acute tonsillitis, unspecified    • Allergic asthma     IgE-mediated allergic asthma      • Allergic rhinitis due to pollen    • Anxiety    •  Asthma    • Back pain    • Backache    • Breast lump    • Cellulitis of trunk     Postoperative   • Chronic pain    • Corns and callus    • Cough    • Depressive disorder    • Diarrhea    • Dysphagia    • Dysuria    • Endometriosis    • Family history of malignant neoplasm of gastrointestinal tract    • GERD (gastroesophageal reflux disease)    • Hirsutism    • Hypertension    • Irritable bowel syndrome    • Leukorrhea, not specified as infective    • Mastitis    • Migraine    • Sebaceous cyst    • Shortness of breath    • Tick bite    • Tobacco user    • Upper respiratory infection        Allergies   Allergen Reactions   • Contrast Dye Hives   • Naproxen Rash   • Penicillins Rash   • Sulfur Rash   • Tetracyclines & Related Rash       Past Surgical History:   Procedure Laterality Date   • DIAGNOSTIC LAPAROSCOPY  04/08/2008    Laparosc diagnostic (2)      • ENDOSCOPY  03/26/2013    Colon endoscopy 26654 (2)      • ENDOSCOPY W/ PEG TUBE PLACEMENT  03/26/2013    EGD w/ tube 86064 (2)      • EXCISION LESION  01/30/2013    EXC TR-EXT Benign+Irina 0.6-1 CM 00444 (1)      • HAND TENOLYSIS  07/23/1992    Hand/finger surgery (1)      • HEAD & NECK SKIN LESION EXCISIONAL BIOPSY  01/31/1994    Biopsy of Skin 34571 (2)      • INCISION AND DRAINAGE BREAST ABSCESS  12/03/2012    Anesth, surgery of breast (1)      • INJECTION OF MEDICATION  09/12/2011    B12 (1)      • INJECTION OF MEDICATION  04/28/2015    Celestone (betamethasone) (1)     • INJECTION OF MEDICATION  08/17/2015    Depo Medrol (Methylprednisone) (6)      • INJECTION OF MEDICATION  06/08/2012    Kenalog (3)      • INJECTION OF MEDICATION  06/08/2012    Toradol (1)      • NASOLACRIMAL DUCT PROBING  08/16/1973    Probe nasolacrimal duct (1)      • NEPHROSTOMY TRACT DILATATION W/ LITHOTRIPSY     • PAP SMEAR  03/20/2008    PAP SMEAR (1)      • TOTAL ABDOMINAL HYSTERECTOMY WITH SALPINGO OOPHORECTOMY  04/09/2012    ERLIN/BSO (1)          Family History   Problem Relation Age  of Onset   • Cancer Other      other   • Colon cancer Other      Colorectal Cancer   • Diabetes Other    • Heart disease Other    • Hypertension Other    • Lung cancer Other        Social History     Social History   • Marital status:      Spouse name: N/A   • Number of children: N/A   • Years of education: N/A     Social History Main Topics   • Smoking status: Current Every Day Smoker     Packs/day: 1.00   • Smokeless tobacco: None   • Alcohol use Yes      Comment: Pt states she drinks less than once a month   • Drug use: No   • Sexual activity: Not Asked     Other Topics Concern   • None     Social History Narrative           Objective   Physical Exam   Constitutional: She is oriented to person, place, and time. Vital signs are normal. She appears well-developed and well-nourished.   HENT:   Head: Normocephalic.   Eyes: Lids are normal. Pupils are equal, round, and reactive to light.   Neck: Trachea normal and normal range of motion.   Cardiovascular: Normal rate, regular rhythm, S1 normal, S2 normal and normal heart sounds.    Pulmonary/Chest: Effort normal and breath sounds normal.   Abdominal: Soft. Normal appearance.   Musculoskeletal: Normal range of motion.   Neurological: She is alert and oriented to person, place, and time. She has normal strength. GCS eye subscore is 4. GCS verbal subscore is 5. GCS motor subscore is 6.   Skin: Skin is warm, dry and intact.   Nursing note and vitals reviewed.      Procedures       .  Results for orders placed or performed during the hospital encounter of 06/11/17   Comprehensive Metabolic Panel   Result Value Ref Range    Glucose 128 (H) 60 - 100 mg/dL    BUN 9 7 - 21 mg/dL    Creatinine 0.69 0.50 - 1.00 mg/dL    Sodium 139 137 - 145 mmol/L    Potassium 3.8 3.5 - 5.1 mmol/L    Chloride 108 95 - 110 mmol/L    CO2 20.0 (L) 22.0 - 31.0 mmol/L    Calcium 9.4 8.4 - 10.2 mg/dL    Total Protein 7.4 6.3 - 8.6 g/dL    Albumin 4.10 3.40 - 4.80 g/dL    ALT (SGPT) 32 9 - 52  U/L    AST (SGOT) 15 14 - 36 U/L    Alkaline Phosphatase 70 38 - 126 U/L    Total Bilirubin 0.4 0.2 - 1.3 mg/dL    eGFR Non African Amer 92 >60 mL/min/1.73    Globulin 3.3 2.3 - 3.5 gm/dL    A/G Ratio 1.2 1.1 - 1.8 g/dL    BUN/Creatinine Ratio 13.0 7.0 - 25.0    Anion Gap 11.0 5.0 - 15.0 mmol/L   Troponin   Result Value Ref Range    Troponin I <0.012 <=0.034 ng/mL   CK   Result Value Ref Range    Creatine Kinase 58 30 - 135 U/L   CK-MB   Result Value Ref Range    CKMB 1.10 0.00 - 5.00 ng/mL   CBC Auto Differential   Result Value Ref Range    WBC 11.22 (H) 3.20 - 9.80 10*3/mm3    RBC 4.91 3.77 - 5.16 10*6/mm3    Hemoglobin 15.7 (H) 12.0 - 15.5 g/dL    Hematocrit 45.8 (H) 35.0 - 45.0 %    MCV 93.3 80.0 - 98.0 fL    MCH 32.0 26.5 - 34.0 pg    MCHC 34.3 31.4 - 36.0 g/dL    RDW 14.1 11.5 - 14.5 %    RDW-SD 48.1 (H) 36.4 - 46.3 fl    MPV 10.7 8.0 - 12.0 fL    Platelets 241 150 - 450 10*3/mm3    Neutrophil % 49.7 37.0 - 80.0 %    Lymphocyte % 41.9 10.0 - 50.0 %    Monocyte % 5.6 0.0 - 12.0 %    Eosinophil % 2.2 0.0 - 7.0 %    Basophil % 0.3 0.0 - 2.0 %    Immature Grans % 0.3 0.0 - 0.5 %    Neutrophils, Absolute 5.58 2.00 - 8.60 10*3/mm3    Lymphocytes, Absolute 4.70 (H) 0.60 - 4.20 10*3/mm3    Monocytes, Absolute 0.63 0.00 - 0.90 10*3/mm3    Eosinophils, Absolute 0.25 0.00 - 0.70 10*3/mm3    Basophils, Absolute 0.03 0.00 - 0.20 10*3/mm3    Immature Grans, Absolute 0.03 (H) 0.00 - 0.02 10*3/mm3    nRBC 0.0 0.0 - 0.0 /100 WBC   Urinalysis With / Culture If Indicated   Result Value Ref Range    Color, UA Yellow Yellow, Straw, Dark Yellow, [place]    Appearance, UA Cloudy (A) Clear    pH, UA 8.0 5.0 - 9.0    Specific Gravity, UA 1.008 1.003 - 1.030    Glucose, UA Negative Negative    Ketones, UA Negative Negative    Bilirubin, UA Negative Negative    Blood, UA Negative Negative    Protein, UA Negative Negative    Leuk Esterase, UA Negative Negative    Nitrite, UA Negative Negative    Urobilinogen, UA 0.2 E.U./dL 0.2 - 1.0  E.U./dL   Urine Drug Screen   Result Value Ref Range    Amphetamine Screen, Urine Negative Negative    Barbiturates Screen, Urine Negative Negative    Benzodiazepine Screen, Urine Negative Negative    Cocaine Screen, Urine Negative Negative    Methadone Screen, Urine Negative Negative    Opiate Screen Positive (A) Negative    Oxycodone Screen, Urine Negative Negative    THC, Screen, Urine Negative Negative   Light Blue Top   Result Value Ref Range    Extra Tube hold for add-on    Green Top (Gel)   Result Value Ref Range    Extra Tube Hold for add-ons.    Lavender Top   Result Value Ref Range    Extra Tube hold for add-on    Gold Top - SST   Result Value Ref Range    Extra Tube Hold for add-ons.        ED Course  ED Course    ..Contacted Hospitalist and agreed upon admission. Orders placed. Patient informed              MDM    Final diagnoses:   Acute intractable headache, unspecified headache type   Essential hypertension            Chencho Dhillon, ALISSON  06/11/17 1933       Chencho Dhillon, ALISSON  06/11/17 1946

## 2017-06-12 ENCOUNTER — APPOINTMENT (OUTPATIENT)
Dept: CARDIOLOGY | Facility: HOSPITAL | Age: 47
End: 2017-06-12

## 2017-06-12 ENCOUNTER — APPOINTMENT (OUTPATIENT)
Dept: MRI IMAGING | Facility: HOSPITAL | Age: 47
End: 2017-06-12

## 2017-06-12 ENCOUNTER — APPOINTMENT (OUTPATIENT)
Dept: ULTRASOUND IMAGING | Facility: HOSPITAL | Age: 47
End: 2017-06-12

## 2017-06-12 LAB
ANION GAP SERPL CALCULATED.3IONS-SCNC: 6 MMOL/L (ref 5–15)
ARTICHOKE IGE QN: 139 MG/DL (ref 1–129)
BASOPHILS # BLD AUTO: 0.03 10*3/MM3 (ref 0–0.2)
BASOPHILS NFR BLD AUTO: 0.3 % (ref 0–2)
BH CV ECHO MEAS - ACS: 1.8 CM
BH CV ECHO MEAS - AO ISTHMUS: 2.1 CM
BH CV ECHO MEAS - AO MAX PG (FULL): 2 MMHG
BH CV ECHO MEAS - AO MAX PG: 6.7 MMHG
BH CV ECHO MEAS - AO MEAN PG (FULL): 1.8 MMHG
BH CV ECHO MEAS - AO MEAN PG: 4 MMHG
BH CV ECHO MEAS - AO ROOT AREA (BSA CORRECTED): 1.6
BH CV ECHO MEAS - AO ROOT AREA: 5.4 CM^2
BH CV ECHO MEAS - AO ROOT DIAM: 2.6 CM
BH CV ECHO MEAS - AO V2 MAX: 129.2 CM/SEC
BH CV ECHO MEAS - AO V2 MEAN: 95.2 CM/SEC
BH CV ECHO MEAS - AO V2 VTI: 25.8 CM
BH CV ECHO MEAS - ASC AORTA: 2.4 CM
BH CV ECHO MEAS - AVA(I,A): 2.2 CM^2
BH CV ECHO MEAS - AVA(I,D): 2.2 CM^2
BH CV ECHO MEAS - AVA(V,A): 2.3 CM^2
BH CV ECHO MEAS - AVA(V,D): 2.3 CM^2
BH CV ECHO MEAS - BSA(HAYCOCK): 1.7 M^2
BH CV ECHO MEAS - BSA: 1.7 M^2
BH CV ECHO MEAS - BZI_BMI: 25.2 KILOGRAMS/M^2
BH CV ECHO MEAS - BZI_METRIC_HEIGHT: 160 CM
BH CV ECHO MEAS - BZI_METRIC_WEIGHT: 64.4 KG
BH CV ECHO MEAS - EDV(CUBED): 61.6 ML
BH CV ECHO MEAS - EDV(TEICH): 67.9 ML
BH CV ECHO MEAS - EF(CUBED): 82.9 %
BH CV ECHO MEAS - EF(MOD-SP4): 65 %
BH CV ECHO MEAS - EF(TEICH): 76.4 %
BH CV ECHO MEAS - ESV(CUBED): 10.5 ML
BH CV ECHO MEAS - ESV(TEICH): 16 ML
BH CV ECHO MEAS - FS: 44.5 %
BH CV ECHO MEAS - IVS/LVPW: 1.5
BH CV ECHO MEAS - IVSD: 0.93 CM
BH CV ECHO MEAS - LA DIMENSION: 2.5 CM
BH CV ECHO MEAS - LA/AO: 0.95
BH CV ECHO MEAS - LV MASS(C)D: 88.9 GRAMS
BH CV ECHO MEAS - LV MASS(C)DI: 53.2 GRAMS/M^2
BH CV ECHO MEAS - LV MAX PG: 4.7 MMHG
BH CV ECHO MEAS - LV MEAN PG: 2.2 MMHG
BH CV ECHO MEAS - LV V1 MAX: 108.3 CM/SEC
BH CV ECHO MEAS - LV V1 MEAN: 68.1 CM/SEC
BH CV ECHO MEAS - LV V1 VTI: 20.6 CM
BH CV ECHO MEAS - LVIDD: 3.9 CM
BH CV ECHO MEAS - LVIDS: 2.2 CM
BH CV ECHO MEAS - LVOT AREA (M): 2.8 CM^2
BH CV ECHO MEAS - LVOT AREA: 2.8 CM^2
BH CV ECHO MEAS - LVOT DIAM: 1.9 CM
BH CV ECHO MEAS - LVPWD: 0.64 CM
BH CV ECHO MEAS - MR MAX PG: 18.9 MMHG
BH CV ECHO MEAS - MR MAX VEL: 217.2 CM/SEC
BH CV ECHO MEAS - MV A MAX VEL: 78.1 CM/SEC
BH CV ECHO MEAS - MV DEC SLOPE: 367 CM/SEC^2
BH CV ECHO MEAS - MV E MAX VEL: 95.1 CM/SEC
BH CV ECHO MEAS - MV E/A: 1.2
BH CV ECHO MEAS - MV MAX PG: 4.6 MMHG
BH CV ECHO MEAS - MV MEAN PG: 1.7 MMHG
BH CV ECHO MEAS - MV P1/2T MAX VEL: 106 CM/SEC
BH CV ECHO MEAS - MV P1/2T: 84.6 MSEC
BH CV ECHO MEAS - MV V2 MAX: 106.9 CM/SEC
BH CV ECHO MEAS - MV V2 MEAN: 60.5 CM/SEC
BH CV ECHO MEAS - MV V2 VTI: 30.9 CM
BH CV ECHO MEAS - MVA P1/2T LCG: 2.1 CM^2
BH CV ECHO MEAS - MVA(P1/2T): 2.6 CM^2
BH CV ECHO MEAS - MVA(VTI): 1.9 CM^2
BH CV ECHO MEAS - PA MAX PG: 3.3 MMHG
BH CV ECHO MEAS - PA V2 MAX: 90.3 CM/SEC
BH CV ECHO MEAS - PI END-D VEL: 50.4 CM/SEC
BH CV ECHO MEAS - RVDD: 2.6 CM
BH CV ECHO MEAS - SI(AO): 83.5 ML/M^2
BH CV ECHO MEAS - SI(CUBED): 30.5 ML/M^2
BH CV ECHO MEAS - SI(LVOT): 34.6 ML/M^2
BH CV ECHO MEAS - SI(TEICH): 31 ML/M^2
BH CV ECHO MEAS - SV(AO): 139.7 ML
BH CV ECHO MEAS - SV(CUBED): 51.1 ML
BH CV ECHO MEAS - SV(LVOT): 57.8 ML
BH CV ECHO MEAS - SV(TEICH): 51.9 ML
BH CV ECHO MEAS - TR MAX VEL: 167.2 CM/SEC
BUN BLD-MCNC: 12 MG/DL (ref 7–21)
BUN/CREAT SERPL: 15.6 (ref 7–25)
CALCIUM SPEC-SCNC: 9.6 MG/DL (ref 8.4–10.2)
CHLORIDE SERPL-SCNC: 110 MMOL/L (ref 95–110)
CHOLEST SERPL-MCNC: 201 MG/DL (ref 0–199)
CO2 SERPL-SCNC: 25 MMOL/L (ref 22–31)
CREAT BLD-MCNC: 0.77 MG/DL (ref 0.5–1)
DEPRECATED RDW RBC AUTO: 49.8 FL (ref 36.4–46.3)
EOSINOPHIL # BLD AUTO: 0.19 10*3/MM3 (ref 0–0.7)
EOSINOPHIL NFR BLD AUTO: 2 % (ref 0–7)
ERYTHROCYTE [DISTWIDTH] IN BLOOD BY AUTOMATED COUNT: 14.4 % (ref 11.5–14.5)
GFR SERPL CREATININE-BSD FRML MDRD: 81 ML/MIN/1.73 (ref 60–135)
GLUCOSE BLD-MCNC: 98 MG/DL (ref 60–100)
HCT VFR BLD AUTO: 46.7 % (ref 35–45)
HDLC SERPL-MCNC: 37 MG/DL (ref 60–200)
HGB BLD-MCNC: 15.4 G/DL (ref 12–15.5)
IMM GRANULOCYTES # BLD: 0.01 10*3/MM3 (ref 0–0.02)
IMM GRANULOCYTES NFR BLD: 0.1 % (ref 0–0.5)
LDLC/HDLC SERPL: 3.02 {RATIO} (ref 0–3.22)
LV EF 2D ECHO EST: 60 %
LYMPHOCYTES # BLD AUTO: 4.86 10*3/MM3 (ref 0.6–4.2)
LYMPHOCYTES NFR BLD AUTO: 50.1 % (ref 10–50)
MCH RBC QN AUTO: 31.3 PG (ref 26.5–34)
MCHC RBC AUTO-ENTMCNC: 33 G/DL (ref 31.4–36)
MCV RBC AUTO: 94.9 FL (ref 80–98)
MONOCYTES # BLD AUTO: 0.61 10*3/MM3 (ref 0–0.9)
MONOCYTES NFR BLD AUTO: 6.3 % (ref 0–12)
NEUTROPHILS # BLD AUTO: 4.01 10*3/MM3 (ref 2–8.6)
NEUTROPHILS NFR BLD AUTO: 41.2 % (ref 37–80)
PLATELET # BLD AUTO: 246 10*3/MM3 (ref 150–450)
PMV BLD AUTO: 11.1 FL (ref 8–12)
POTASSIUM BLD-SCNC: 3.9 MMOL/L (ref 3.5–5.1)
RBC # BLD AUTO: 4.92 10*6/MM3 (ref 3.77–5.16)
SODIUM BLD-SCNC: 141 MMOL/L (ref 137–145)
TRIGL SERPL-MCNC: 262 MG/DL (ref 20–199)
WBC NRBC COR # BLD: 9.71 10*3/MM3 (ref 3.2–9.8)

## 2017-06-12 PROCEDURE — G0378 HOSPITAL OBSERVATION PER HR: HCPCS

## 2017-06-12 PROCEDURE — 25010000002 GADOTERIDOL PER 1 ML: Performed by: INTERNAL MEDICINE

## 2017-06-12 PROCEDURE — 93306 TTE W/DOPPLER COMPLETE: CPT | Performed by: INTERNAL MEDICINE

## 2017-06-12 PROCEDURE — A9576 INJ PROHANCE MULTIPACK: HCPCS | Performed by: INTERNAL MEDICINE

## 2017-06-12 PROCEDURE — 85025 COMPLETE CBC W/AUTO DIFF WBC: CPT | Performed by: NURSE PRACTITIONER

## 2017-06-12 PROCEDURE — 70549 MR ANGIOGRAPH NECK W/O&W/DYE: CPT

## 2017-06-12 PROCEDURE — 80061 LIPID PANEL: CPT | Performed by: NURSE PRACTITIONER

## 2017-06-12 PROCEDURE — 25010000002 PROCHLORPERAZINE EDISYLATE PER 10 MG: Performed by: NURSE PRACTITIONER

## 2017-06-12 PROCEDURE — 70553 MRI BRAIN STEM W/O & W/DYE: CPT

## 2017-06-12 PROCEDURE — 25010000002 DIPHENHYDRAMINE PER 50 MG: Performed by: NURSE PRACTITIONER

## 2017-06-12 PROCEDURE — 93880 EXTRACRANIAL BILAT STUDY: CPT

## 2017-06-12 PROCEDURE — 93306 TTE W/DOPPLER COMPLETE: CPT

## 2017-06-12 PROCEDURE — 80048 BASIC METABOLIC PNL TOTAL CA: CPT | Performed by: NURSE PRACTITIONER

## 2017-06-12 PROCEDURE — 96375 TX/PRO/DX INJ NEW DRUG ADDON: CPT

## 2017-06-12 PROCEDURE — 96374 THER/PROPH/DIAG INJ IV PUSH: CPT

## 2017-06-12 RX ORDER — DIPHENHYDRAMINE HYDROCHLORIDE 50 MG/ML
25 INJECTION INTRAMUSCULAR; INTRAVENOUS EVERY 6 HOURS PRN
Status: DISCONTINUED | OUTPATIENT
Start: 2017-06-12 | End: 2017-06-13 | Stop reason: HOSPADM

## 2017-06-12 RX ORDER — HYDROCODONE BITARTRATE AND ACETAMINOPHEN 7.5; 325 MG/1; MG/1
1 TABLET ORAL EVERY 6 HOURS PRN
Status: DISCONTINUED | OUTPATIENT
Start: 2017-06-12 | End: 2017-06-13 | Stop reason: HOSPADM

## 2017-06-12 RX ADMIN — HYDROCODONE BITARTRATE AND ACETAMINOPHEN 1 TABLET: 7.5; 325 TABLET ORAL at 12:13

## 2017-06-12 RX ADMIN — HYDROCODONE BITARTRATE AND ACETAMINOPHEN 1 TABLET: 7.5; 325 TABLET ORAL at 05:25

## 2017-06-12 RX ADMIN — ASPIRIN 81 MG 81 MG: 81 TABLET ORAL at 10:25

## 2017-06-12 RX ADMIN — DIPHENHYDRAMINE HYDROCHLORIDE 25 MG: 50 INJECTION INTRAMUSCULAR; INTRAVENOUS at 14:46

## 2017-06-12 RX ADMIN — NICOTINE 1 PATCH: 21 PATCH TRANSDERMAL at 18:22

## 2017-06-12 RX ADMIN — GADOTERIDOL 15 ML: 279.3 INJECTION, SOLUTION INTRAVENOUS at 08:20

## 2017-06-12 RX ADMIN — Medication 10 ML: at 15:56

## 2017-06-12 RX ADMIN — ATORVASTATIN CALCIUM 80 MG: 40 TABLET, FILM COATED ORAL at 21:20

## 2017-06-12 RX ADMIN — HYDROCODONE BITARTRATE AND ACETAMINOPHEN 1 TABLET: 7.5; 325 TABLET ORAL at 18:20

## 2017-06-12 RX ADMIN — PROCHLORPERAZINE EDISYLATE 10 MG: 5 INJECTION INTRAMUSCULAR; INTRAVENOUS at 15:55

## 2017-06-12 NOTE — PROGRESS NOTES
[facility] Medicine Services  INPATIENT PROGRESS NOTE    Length of Stay: 0  Date of Admission: 6/11/2017  Primary Care Physician: Nick Neal MD    Subjective   Chief Complaint: Headache and dizziness    HPI:  This is a 47-year-old lady that presented to the emergency room yesterday after reporting to the urgent care center earlier during the day for headache.  Ultrasound of the carotid arteries was negative.   MRI of the brain showed bilateral frontal lobe deep white matter small high signal lesions on flair sequence with these lesions nonenhancing most likely representing small foci of chronic ischemic gliosis secondary to microvascular disease versus sequela from migraines versus less likely vasculitic disorder or demyelinating process.  A consult was put in with neurology SOC.  The MRI was reviewed and the neurologist felt this is a chronic problem due to migraines.  A recommendation of Compazine, Benadryl and Toradol was given.  Due to the patient being allergic to Toradol, Compazine and Benadryl was given.  The patient states she is very hesitant to be discharged because she is extremely dizzy.  However, nursing staff indicates that the patient has left the floor on numerous occasions.  I requested that the patient stay on the observation floor due to the dizziness, and we could observe her 1 more night while taken the Compazine and Benadryl and will most likely discharge the patient in the morning.     Review of Systems   Constitutional: Negative.    Eyes: Negative.    Respiratory: Negative.    Cardiovascular: Negative.    Gastrointestinal: Negative.    Endocrine: Negative.    Genitourinary: Negative.    Musculoskeletal: Negative.    Skin: Negative.    Allergic/Immunologic: Negative.    Neurological: Positive for dizziness and headaches.   Hematological: Negative.    Psychiatric/Behavioral: Negative.         All pertinent negatives and positives are as  above. All other systems have been reviewed and are negative unless otherwise stated.     Objective    Temp:  [97.2 °F (36.2 °C)-97.7 °F (36.5 °C)] 97.6 °F (36.4 °C)  Heart Rate:  [62-74] 62  Resp:  [16-18] 16  BP: (121-176)/(81-97) 139/81    Physical Exam   Constitutional: She is oriented to person, place, and time. She appears well-developed and well-nourished.   HENT:   Head: Normocephalic and atraumatic.   Eyes: EOM are normal. Pupils are equal, round, and reactive to light.   Neck: Normal range of motion. Neck supple.   Cardiovascular: Normal rate and regular rhythm.    Pulmonary/Chest: Effort normal and breath sounds normal.   Abdominal: Soft. Bowel sounds are normal.   Musculoskeletal: Normal range of motion.   Neurological: She is alert and oriented to person, place, and time.   Skin: Skin is warm and dry.   Psychiatric: She has a normal mood and affect.     Results Review:  I have reviewed the labs, radiology results, and diagnostic studies.    Laboratory Data:     Results from last 7 days  Lab Units 06/12/17  0515 06/11/17  1944 06/11/17  1539   SODIUM mmol/L 141 141 139   POTASSIUM mmol/L 3.9 4.1 3.8   CHLORIDE mmol/L 110 108 108   TOTAL CO2 mmol/L 25.0 20.0* 20.0*   BUN mg/dL 12 9 9   CREATININE mg/dL 0.77 0.71 0.69   GLUCOSE mg/dL 98 98 128*   CALCIUM mg/dL 9.6 9.3 9.4   BILIRUBIN mg/dL  --   --  0.4   ALK PHOS U/L  --   --  70   ALT (SGPT) U/L  --   --  32   AST (SGOT) U/L  --   --  15   ANION GAP mmol/L 6.0 13.0 11.0     Estimated Creatinine Clearance: 84.5 mL/min (by C-G formula based on Cr of 0.77).            Results from last 7 days  Lab Units 06/12/17  0515 06/11/17  1539   WBC 10*3/mm3 9.71 11.22*   HEMOGLOBIN g/dL 15.4 15.7*   HEMATOCRIT % 46.7* 45.8*   PLATELETS 10*3/mm3 246 241           Culture Data:   Blood Culture   Date Value Ref Range Status   06/11/2017 No growth at less than 24 hours  Preliminary   06/11/2017 No growth at less than 24 hours  Preliminary     No results found for:  URINECX  No results found for: RESPCX  No results found for: WOUNDCX  No results found for: STOOLCX  No components found for: BODYFLD    Radiology Data:   Imaging Results (last 24 hours)     Procedure Component Value Units Date/Time    XR Chest 1 View [[id number]] Collected:  06/11/17 1607     Updated:  06/11/17 1625    Narrative:       Patient Name:  RAMBO COTTER  Patient ID:  [id number]   Ordering:  GILES NOONAN  Attending:  ANNE HUTCHINSON  Referring:  GILES NOONAN  ------------------------------------------------    PORTABLE CHEST    HISTORY: Hypertension. Dizziness.    Portable AP upright film of the chest was obtained at 3:48 PM.  COMPARISON: October 3, 2015.    EKG leads in place.  The lungs are clear of an acute process.  The heart is not enlarged.  The pulmonary vasculature is not increased.  No pleural effusion.  No pneumothorax.  No acute osseous abnormality.      Impression:       CONCLUSION:  No Acute Disease    [id number]    Electronically signed by:  Joseph Lua MD  6/11/2017 4:25 PM CDT  Workstation: [employer]    CT Head Without Contrast [[id number]] Collected:  06/11/17 1655     Updated:  06/11/17 1709    Narrative:       Patient Name:  RAMBO COTTER  Patient ID:  [id number]   Ordering:  GILES NOONAN  Attending:  ANNE HUTCHINSON  Referring:  GILES NOONAN  ------------------------------------------------    CT Head Without Contrast    History: Headache    Axial scans of the brain were obtained without intravenous  contrast.  Coronal and sagital reconstructions were preformed.    This exam was performed according to our departmental  dose-optimization program, which includes automated exposure  control, adjustment of the mA and/or kV according to patient size  and/or use of iterative reconstruction technique.    DLP: 907.40    Comparison: August 14, 2015.    Bone windows are unremarkable.  The visualized paranasal sinuses are unremarkable.    No hemorrhage.  No  mass.  No abnormal areas of increased or decreased attenuation.  No midline shift.  No abnormal extra-axial fluid collections.      Impression:       CONCLUSION:  Normal nonenhanced CT of the brain.    [id number]    Electronically signed by:  Joseph Lua MD  6/11/2017 5:09 PM CDT  Workstation: [id number]    MRI Angiogram Neck With & Without Contrast [[id number]] Collected:  06/12/17 0725     Updated:  06/12/17 0936    Narrative:       Procedure: MRI angiogram neck with contrast.    Reason for exam: Headache, essential primary hypertension, TIA  versus CVA.    FINDINGS: Multiplanar multisequence MR imaging of the carotid  system with contrast with 3-D carotid arterial system imaging.  Motion during exam limits study somewhat.    The left vertebral artery is normal. The right vertebral artery  is normal. The left subclavian artery is normal. The left common  carotid artery is normal. The left internal carotid artery is  normal. The left external carotid artery is normal. The  brachiocephalic trunk is normal. The right subclavian artery is  normal. The right common carotid artery is normal. The right  internal carotid artery is normal. The right external carotid  artery is normal.      Impression:       Normal MR angiogram study of the carotid arterial  systems.    Electronically signed by:  Gamaliel Decker MD  6/12/2017 9:36 AM CDT  Workstation: [id number]    MRI Brain With & Without Contrast [[id number]] Collected:  06/12/17 0724     Updated:  06/12/17 0943    Narrative:       Procedure: MR brain with and without contrast    Reason for exam: TIA versus CVA, headache, essential primary  hypertension.    FINDINGS: Multisequence multiplanar MR imaging of the brain was  performed with and without contrast. The diffusion weighted  series appears normal with no abnormal signal seen to suggest  restricted diffusion. Right frontal lobe deep white matter 2  small circular foci of increased signal on FLAIR sequence are  seen with  these regions appearing mildly lower signal on T1  weighting and nonenhancing. The largest focus measures 5.5 mm in  greatest diameter. Left frontal lobe subcortical deep white  matter 2 mm circular focus of increased signal on FLAIR weighted  sequences seen. Otherwise cerebral and cerebellar parenchymal are  normal. There is no abnormal focus of enhancement seen.  Ventricular system and subarachnoid spaces are normal. The  pituitary is normal.      Impression:       1.  No acute intracranial abnormality.  2.  Bilateral frontal lobe deep white matter small high signal  lesions on FLAIR sequence with these lesions nonenhancing. These  lesions most likely represent small foci of chronic ischemic  gliosis secondary to microvascular disease versus sequela from  migraines versus less likely vasculitic disorder or demyelinating  process. Recommend clinical correlation.    Electronically signed by:  Gamaliel Decker MD  6/12/2017 9:43 AM CDT  Workstation: [id number]     Carotid Bilateral [[id number]] Collected:  06/12/17 0908     Updated:  06/12/17 1013    Narrative:       PROCEDURE: Bilateral duplex carotid and vertebral ultrasound.    INDICATION: Dizziness with near syncope. Headache. Essential  hypertension.    COMPARISON: MRA neck with contrast 6/12/2017.    RIGHT CAROTID BIFURCATION FINDINGS.    Peak systolic velocities in centimeters per second. Right CCA 78,  , . Right ICA/CCA peak systolic velocity ratio 1.3.    Real-time imaging demonstrates no plaque or stenosis in the right  carotid bifurcation arteries.    LEFT CAROTID BIFURCATION FINDINGS.    Peak systolic velocities in centimeters per second. Left CCA 92  and left . Left ICA/CCA peak systolic velocity ratio 1.2.    Real-time imaging demonstrates no plaques or stenosis in the left  carotid bifurcation arteries.    VERTEBRAL ARTERIES. There is antegrade flow through both  vertebral arteries.    Incidental finding of a solid 1.3 cm nodule  "mildly isoechoic  along the posterior margin of the right lobe thyroid midpole  area.      Impression:       Negative bilateral duplex carotid ultrasound with no  plaques or stenosis.    Antegrade flow through both vertebral arteries.    1.3 cm solid isoechoic nodule right lobe thyroid.    [id number]      Electronically signed by:  Edward Kauffman MD  6/12/2017 9:49  AM CDT Workstation: [employer]          I have reviewed the patient current medications.     Assessment/Plan     Plan:      Assessment/plan:  Migraine:  Patient is complaining of dizziness and the feeling of being \"unable to answer questions\" . CT of head is negative for any acute abnormalities.  Ultrasound of the carotid arteries was negative.    MRI of the brain revealed:   Bilateral frontal lobe deep white matter small high signal  lesions on FLAIR sequence with these lesions nonenhancing. These  lesions most likely represent small foci of chronic ischemic  gliosis secondary to microvascular disease versus sequela from  migraines versus less likely vasculitic disorder or demyelinating  process. Recommend clinical correlation.    Neurology SOC was consulted.  Recommendation of starting the patient on Compazine, Toradol, and Benadryl was given. Patient is allergic to Toradol.  Pain management clinic was also suggested.    The patient states she feels she is unready for discharge due to the dizziness.  We will observe the patient overnight and plan on discharge in the AM with compazine and benadryl, and a follow up with Dr. Neal with suggestion of pain management clinic for migraines.            Discharge Planning: I expect patient to be discharged to home in 1 day.      This document has been electronically signed by ALISSON Limon on June 12, 2017 4:19 PM      I personally evaluated and examined the patient in conjunction with KUSH cole and agree with the assessment, treatment plan, and disposition of the patient as recorded.   " Lungs are clear

## 2017-06-12 NOTE — PLAN OF CARE
Problem: Patient Care Overview (Adult)  Goal: Plan of Care Review  Outcome: Ongoing (interventions implemented as appropriate)    06/12/17 0511   Coping/Psychosocial Response Interventions   Plan Of Care Reviewed With patient   Patient Care Overview   Progress improving   Outcome Evaluation   Outcome Summary/Follow up Plan Pt complained of pain upon arrival but slept during the night with no complaints. No acute signs of distress. Will continue to monitor. Pt to have labs, US, and MRI done today.        Goal: Adult Individualization and Mutuality  Outcome: Ongoing (interventions implemented as appropriate)  Goal: Discharge Needs Assessment  Outcome: Ongoing (interventions implemented as appropriate)    Problem: Pain, Acute (Adult)  Goal: Identify Related Risk Factors and Signs and Symptoms  Outcome: Ongoing (interventions implemented as appropriate)  Goal: Acceptable Pain Control/Comfort Level  Outcome: Ongoing (interventions implemented as appropriate)

## 2017-06-12 NOTE — PLAN OF CARE
"Problem: Patient Care Overview (Adult)  Goal: Plan of Care Review  Outcome: Ongoing (interventions implemented as appropriate)    06/12/17 3345   Coping/Psychosocial Response Interventions   Plan Of Care Reviewed With patient;spouse   Patient Care Overview   Progress no change   Outcome Evaluation   Outcome Summary/Follow up Plan Pt states she is having acute headache that \"will not go away\". Pt left unit multiple times to go downstairs for a cigarette per pt after being asked by staff to stay on unit. No acute s/s of distress, Vitals stable and wnl, pt able to rest well throughout the day.        Goal: Adult Individualization and Mutuality  Outcome: Ongoing (interventions implemented as appropriate)  Goal: Discharge Needs Assessment  Outcome: Ongoing (interventions implemented as appropriate)    Problem: Pain, Acute (Adult)  Goal: Identify Related Risk Factors and Signs and Symptoms  Outcome: Ongoing (interventions implemented as appropriate)  Goal: Acceptable Pain Control/Comfort Level  Outcome: Ongoing (interventions implemented as appropriate)      "

## 2017-06-12 NOTE — H&P
"      [facility] Medicine Admission      Date of Admission: 2017      Primary Care Physician: Nick Neal MD      Chief Complaint: Headache    HPI: 47-year-old  female who presented to the emergency department today after reporting to urgent care earlier during the day.  The patient is complaining of headache and feeling \"as if the blood flow on the left side of her neck cannot get into her brain.\"  Patient reports that she started having symptoms yesterday while she was in [employer] grocery shopping.  Her  reports that she called him and said that she was \"sick and confused\".  The patient's  reports that she appears to have intermittent confusion and inability to finish her sentences at times.  The patient reports that she was started on a new medication for her thyroid this past Tuesday, however the patient is unsure with the medication was and does not have her medication with her.  During today's visit in the emergency department she is alert and oriented to person, place, time, and situation but she is still reporting having a headache with blurred vision and confusion despite being able to tell staff appropriate answers to all questions.    Concurrent Medical History: Past medical history includes asthma, chronic back pain with visits to pain management, depression, endometriosis, irritable bowel syndrome, gastroesophageal reflux disease, chronic migraines.    Past Surgical History: Total abdominal hysterectomy, tear duct repair, fracture repair to left hand, cholecystectomy    Family History: Family history is significant for hypertension in the patient's mother who is still living.  Patient's father is  related to lung cancer.  She has one brother with a history of type 2 diabetes and hypertension.    Social History: Patient is a smoker of at least one pack per day for the past 30 years.  She denies alcohol or illicit drug " use.    Allergies:   Allergies   Allergen Reactions   • Contrast Dye Hives   • Naproxen Rash   • Penicillins Rash   • Sulfur Rash   • Tetracyclines & Related Rash       Medications: Scheduled Meds:  aspirin 81 mg Oral Daily   Or      aspirin 300 mg Rectal Daily   atorvastatin 80 mg Oral Nightly   nicotine 1 patch Transdermal Q24H     Continuous Infusions:   PRN Meds:.•  acetaminophen **OR** acetaminophen  •  acetaminophen  •  ondansetron  •  sodium chloride  •  sodium chloride    Review of Systems:  Review of Systems   Constitutional: Negative for chills and fever.   Respiratory: Negative for chest tightness, shortness of breath and wheezing.    Cardiovascular: Negative for chest pain, palpitations and leg swelling.   Gastrointestinal: Negative for abdominal pain, constipation, diarrhea, nausea and vomiting.   Musculoskeletal: Positive for back pain. Negative for neck pain.   Skin: Negative for pallor.   Neurological: Positive for dizziness, weakness and headaches. Negative for seizures, syncope and numbness.   Psychiatric/Behavioral: Positive for confusion.      Otherwise complete ROS is negative except as mentioned above.    Physical Exam:   Temp:  [98.2 °F (36.8 °C)-98.3 °F (36.8 °C)] 98.2 °F (36.8 °C)  Heart Rate:  [62-88] 70  Resp:  [16] 16  BP: (138-176)/(84-95) 162/92  Physical Exam   Constitutional: She is oriented to person, place, and time. She appears well-developed and well-nourished.   HENT:   Head: Normocephalic.   Eyes: Conjunctivae and EOM are normal. Pupils are equal, round, and reactive to light.   Neck: Normal range of motion. Neck supple.   Cardiovascular: Normal rate, regular rhythm, normal heart sounds and intact distal pulses.    Pulmonary/Chest: Effort normal and breath sounds normal. No respiratory distress. She has no wheezes.   Abdominal: Soft. Bowel sounds are normal. She exhibits no distension. There is no tenderness.   Musculoskeletal: Normal range of motion. She exhibits no edema.    Neurological: She is alert and oriented to person, place, and time. She has normal strength. No sensory deficit. GCS eye subscore is 4. GCS verbal subscore is 5. GCS motor subscore is 6.   Skin: Skin is warm and dry. No erythema.   Psychiatric: She has a normal mood and affect. Her behavior is normal.   Vitals reviewed.        Results Reviewed:  I have personally reviewed current lab, radiology, and data and agree with results.  Lab Results (last 24 hours)     Procedure Component Value Units Date/Time    CBC & Differential [[id number]] Collected:  06/11/17 1539    Specimen:  Blood Updated:  06/11/17 1613    Narrative:       The following orders were created for panel order CBC & Differential.  Procedure                               Abnormality         Status                     ---------                               -----------         ------                     CBC Auto Differential[[id number]]        Abnormal            Final result                 Please view results for these tests on the individual orders.    CBC Auto Differential [[id number]]  (Abnormal) Collected:  06/11/17 1539    Specimen:  Blood Updated:  06/11/17 1613     WBC 11.22 (H) 10*3/mm3      RBC 4.91 10*6/mm3      Hemoglobin 15.7 (H) g/dL      Hematocrit 45.8 (H) %      MCV 93.3 fL      MCH 32.0 pg      MCHC 34.3 g/dL      RDW 14.1 %      RDW-SD 48.1 (H) fl      MPV 10.7 fL      Platelets 241 10*3/mm3      Neutrophil % 49.7 %      Lymphocyte % 41.9 %      Monocyte % 5.6 %      Eosinophil % 2.2 %      Basophil % 0.3 %      Immature Grans % 0.3 %      Neutrophils, Absolute 5.58 10*3/mm3      Lymphocytes, Absolute 4.70 (H) 10*3/mm3      Monocytes, Absolute 0.63 10*3/mm3      Eosinophils, Absolute 0.25 10*3/mm3      Basophils, Absolute 0.03 10*3/mm3      Immature Grans, Absolute 0.03 (H) 10*3/mm3      nRBC 0.0 /100 WBC     Comprehensive Metabolic Panel [[id number]]  (Abnormal) Collected:  06/11/17 1539    Specimen:  Blood Updated:  06/11/17 1617      Glucose 128 (H) mg/dL      BUN 9 mg/dL      Creatinine 0.69 mg/dL      Sodium 139 mmol/L      Potassium 3.8 mmol/L      Chloride 108 mmol/L      CO2 20.0 (L) mmol/L      Calcium 9.4 mg/dL      Total Protein 7.4 g/dL      Albumin 4.10 g/dL      ALT (SGPT) 32 U/L      AST (SGOT) 15 U/L      Alkaline Phosphatase 70 U/L      Total Bilirubin 0.4 mg/dL      eGFR Non African Amer 92 mL/min/1.73      Globulin 3.3 gm/dL      A/G Ratio 1.2 g/dL      BUN/Creatinine Ratio 13.0     Anion Gap 11.0 mmol/L     CK [[id number]]  (Normal) Collected:  06/11/17 1539    Specimen:  Blood Updated:  06/11/17 1617     Creatine Kinase 58 U/L     Troponin [[id number]]  (Normal) Collected:  06/11/17 1539    Specimen:  Blood Updated:  06/11/17 1629     Troponin I <0.012 ng/mL     CK-MB [[id number]]  (Normal) Collected:  06/11/17 1539    Specimen:  Blood Updated:  06/11/17 1629     CKMB 1.10 ng/mL     Lavender Top [[id number]] Collected:  06/11/17 1539    Specimen:  Blood Updated:  06/11/17 1701     Extra Tube hold for add-on      Auto resulted       [place] Draw [[id number]] Collected:  06/11/17 1539    Specimen:  Blood Updated:  06/11/17 1701    Narrative:       The following orders were created for panel order [place] Draw.  Procedure                               Abnormality         Status                     ---------                               -----------         ------                     Light Blue Top[[id number]]                                   Final result               Green Top (Gel)[[id number]]                                  Final result               Lavender Top[[id number]]                                     Final result               Gold Top - SST[[id number]]                                   Final result                 Please view results for these tests on the individual orders.    Light Blue Top [[id number]] Collected:  06/11/17 1539    Specimen:  Blood Updated:  06/11/17 1701     Extra Tube hold for add-on      Auto resulted        Green Top (Gel) [[id number]] Collected:  06/11/17 1539    Specimen:  Blood Updated:  06/11/17 1701     Extra Tube Hold for add-ons.      Auto resulted.       Gold Top - SST [[id number]] Collected:  06/11/17 1539    Specimen:  Blood Updated:  06/11/17 1701     Extra Tube Hold for add-ons.      Auto resulted.       Urinalysis With / Culture If Indicated [[id number]]  (Abnormal) Collected:  06/11/17 1711    Specimen:  Urine from Urine, Clean Catch Updated:  06/11/17 1719     Color, UA Yellow     Appearance, UA Cloudy (A)     pH, UA 8.0     Specific Gravity, UA 1.008     Glucose, UA Negative     Ketones, UA Negative     Bilirubin, UA Negative     Blood, UA Negative     Protein, UA Negative     Leuk Esterase, UA Negative     Nitrite, UA Negative     Urobilinogen, UA 0.2 E.U./dL    Narrative:       Urine microscopic not indicated.    Urine Drug Screen [[id number]]  (Abnormal) Collected:  06/11/17 1711    Specimen:  Urine from Urine, Clean Catch Updated:  06/11/17 1843     Amphetamine Screen, Urine Negative     Barbiturates Screen, Urine Negative     Benzodiazepine Screen, Urine Negative     Cocaine Screen, Urine Negative     Methadone Screen, Urine Negative     Opiate Screen Positive (A)     Oxycodone Screen, Urine Negative     THC, Screen, Urine Negative    Narrative:       Negative Thresholds For Drugs Screened in Urine:     Amphetamines          500 ng/ml  Barbiturates          200 ng/ml  Benzodiazepines       200 ng/ml  Cocaine               150 ng/ml  Methadone             300 ng/mL  Opiates               300 ng/mL  Oxycodone             100 ng/mL  THC                   20 ng/mL    The normal value for all drugs tested is negative. This report includes final unconfirmed screening results.  A positive result by this assay can be, at your request, sent to the Reference Lab for confirmation by gas chromatography. Unconfirmed results must not be used for non-medical purposes, such as employment or legal testing.  Clinical consideration should be applied to any drug of abuse test result, particularly when unconfirmed results are used.        Imaging Results (last 24 hours)     Procedure Component Value Units Date/Time    XR Chest 1 View [[id number]] Collected:  06/11/17 1607     Updated:  06/11/17 1625    Narrative:       Patient Name:  RAMBO COTTER  Patient ID:  [id number]   Ordering:  GILES NOONAN  Attending:  ANNE HUTCHINSON  Referring:  GILES NOONAN  ------------------------------------------------    PORTABLE CHEST    HISTORY: Hypertension. Dizziness.    Portable AP upright film of the chest was obtained at 3:48 PM.  COMPARISON: October 3, 2015.    EKG leads in place.  The lungs are clear of an acute process.  The heart is not enlarged.  The pulmonary vasculature is not increased.  No pleural effusion.  No pneumothorax.  No acute osseous abnormality.      Impression:       CONCLUSION:  No Acute Disease    [id number]    Electronically signed by:  Joseph Lua MD  6/11/2017 4:25 PM CDT  Workstation: [email or web address]    CT Head Without Contrast [[id number]] Collected:  06/11/17 1655     Updated:  06/11/17 1709    Narrative:       Patient Name:  RAMBO COTTER  Patient ID:  [id number]   Ordering:  GILES NOONAN  Attending:  ANNE HUTCHINSON  Referring:  GILES YISEL SHAISTA  ------------------------------------------------    CT Head Without Contrast    History: Headache    Axial scans of the brain were obtained without intravenous  contrast.  Coronal and sagital reconstructions were preformed.    This exam was performed according to our departmental  dose-optimization program, which includes automated exposure  control, adjustment of the mA and/or kV according to patient size  and/or use of iterative reconstruction technique.    DLP: 907.40    Comparison: August 14, 2015.    Bone windows are unremarkable.  The visualized paranasal sinuses are unremarkable.    No hemorrhage.  No mass.  No abnormal areas of  increased or decreased attenuation.  No midline shift.  No abnormal extra-axial fluid collections.      Impression:       CONCLUSION:  Normal nonenhanced CT of the brain.    [id number]    Electronically signed by:  Joseph Lua MD  6/11/2017 5:09 PM CDT  Workstation: [id number]            Assessment/plan:  #1 headache with history of migraines: Patient is complaining of blurred vision and confusion.  CT of head is negative for any acute abnormalities.  Continue patient's home medications for migraine as well as IV meds for pain control.  SOC is consulted for neuro evaluation.  #2 rule out TIA versus CVA: CT of head is negative for any signs of acute CVA.  MRI of brain in the morning.  Carotid ultrasound.  Aspirin, statin.  Continuous neurochecks.  #3 chronic pain: Continue patient's home doses of Norco.  #4 asthma  #5 depression  #6 gastroesophageal reflux disease  #7 irritable bowel syndrome    Further orders on the patient will depend upon the hospital course.  The plan of care was discussed with the patient, her , and mother who were all at bedside.  It is explained to the patient that her symptoms could be related to a neurologic condition and that we do not have neurology coverage on site at our facility.  The patient has declined transfer to any neurology center at this time and wishes to stay at our facility overnight.  She is agreeable for transfer if any abnormalities on the overnight workup are found.  CODE STATUS is been discussed with her patient has been her is at bedside.  The patient wishes to be a full CODE STATUS.            This document has been electronically signed by ALISSON Quinn on June 11, 2017 7:20 PM

## 2017-06-13 VITALS
BODY MASS INDEX: 24.31 KG/M2 | HEART RATE: 73 BPM | SYSTOLIC BLOOD PRESSURE: 116 MMHG | DIASTOLIC BLOOD PRESSURE: 68 MMHG | OXYGEN SATURATION: 97 % | RESPIRATION RATE: 18 BRPM | WEIGHT: 142.4 LBS | TEMPERATURE: 97.4 F | HEIGHT: 64 IN

## 2017-06-13 LAB
ANION GAP SERPL CALCULATED.3IONS-SCNC: 10 MMOL/L (ref 5–15)
BASOPHILS # BLD AUTO: 0.03 10*3/MM3 (ref 0–0.2)
BASOPHILS NFR BLD AUTO: 0.2 % (ref 0–2)
BUN BLD-MCNC: 11 MG/DL (ref 7–21)
BUN/CREAT SERPL: 16.4 (ref 7–25)
CALCIUM SPEC-SCNC: 9.1 MG/DL (ref 8.4–10.2)
CHLORIDE SERPL-SCNC: 112 MMOL/L (ref 95–110)
CO2 SERPL-SCNC: 21 MMOL/L (ref 22–31)
CREAT BLD-MCNC: 0.67 MG/DL (ref 0.5–1)
DEPRECATED RDW RBC AUTO: 49 FL (ref 36.4–46.3)
EOSINOPHIL # BLD AUTO: 0.16 10*3/MM3 (ref 0–0.7)
EOSINOPHIL NFR BLD AUTO: 1.2 % (ref 0–7)
ERYTHROCYTE [DISTWIDTH] IN BLOOD BY AUTOMATED COUNT: 14.5 % (ref 11.5–14.5)
GFR SERPL CREATININE-BSD FRML MDRD: 95 ML/MIN/1.73 (ref 58–135)
GLUCOSE BLD-MCNC: 92 MG/DL (ref 60–100)
HCT VFR BLD AUTO: 48.8 % (ref 35–45)
HGB BLD-MCNC: 16.9 G/DL (ref 12–15.5)
IMM GRANULOCYTES # BLD: 0.03 10*3/MM3 (ref 0–0.02)
IMM GRANULOCYTES NFR BLD: 0.2 % (ref 0–0.5)
LYMPHOCYTES # BLD AUTO: 3.88 10*3/MM3 (ref 0.6–4.2)
LYMPHOCYTES NFR BLD AUTO: 28.3 % (ref 10–50)
MCH RBC QN AUTO: 31.9 PG (ref 26.5–34)
MCHC RBC AUTO-ENTMCNC: 34.6 G/DL (ref 31.4–36)
MCV RBC AUTO: 92.2 FL (ref 80–98)
MONOCYTES # BLD AUTO: 0.79 10*3/MM3 (ref 0–0.9)
MONOCYTES NFR BLD AUTO: 5.8 % (ref 0–12)
NEUTROPHILS # BLD AUTO: 8.83 10*3/MM3 (ref 2–8.6)
NEUTROPHILS NFR BLD AUTO: 64.3 % (ref 37–80)
PLATELET # BLD AUTO: 272 10*3/MM3 (ref 150–450)
PMV BLD AUTO: 10.8 FL (ref 8–12)
POTASSIUM BLD-SCNC: 4.1 MMOL/L (ref 3.5–5.1)
RBC # BLD AUTO: 5.29 10*6/MM3 (ref 3.77–5.16)
SODIUM BLD-SCNC: 143 MMOL/L (ref 137–145)
WBC NRBC COR # BLD: 13.72 10*3/MM3 (ref 3.2–9.8)

## 2017-06-13 PROCEDURE — 80048 BASIC METABOLIC PNL TOTAL CA: CPT | Performed by: NURSE PRACTITIONER

## 2017-06-13 PROCEDURE — G0378 HOSPITAL OBSERVATION PER HR: HCPCS

## 2017-06-13 PROCEDURE — 25010000002 PROCHLORPERAZINE EDISYLATE PER 10 MG: Performed by: NURSE PRACTITIONER

## 2017-06-13 PROCEDURE — 96376 TX/PRO/DX INJ SAME DRUG ADON: CPT

## 2017-06-13 PROCEDURE — 85025 COMPLETE CBC W/AUTO DIFF WBC: CPT | Performed by: NURSE PRACTITIONER

## 2017-06-13 RX ORDER — PROCHLORPERAZINE MALEATE 10 MG
10 TABLET ORAL EVERY 6 HOURS PRN
Qty: 30 TABLET | Refills: 0 | Status: SHIPPED | OUTPATIENT
Start: 2017-06-13 | End: 2017-11-16

## 2017-06-13 RX ADMIN — HYDROCODONE BITARTRATE AND ACETAMINOPHEN 1 TABLET: 7.5; 325 TABLET ORAL at 08:33

## 2017-06-13 RX ADMIN — ASPIRIN 81 MG 81 MG: 81 TABLET ORAL at 08:25

## 2017-06-13 RX ADMIN — PROCHLORPERAZINE EDISYLATE 10 MG: 5 INJECTION INTRAMUSCULAR; INTRAVENOUS at 08:48

## 2017-06-13 NOTE — DISCHARGE SUMMARY
[facility] Medicine Services  DISCHARGE SUMMARY       Date of Admission: 6/11/2017  Date of Discharge:  6/13/2017  Primary Care Physician: Nick Neal MD    Presenting Problem/History of Present Illness:  Essential hypertension [I10]  Headache [R51]  Acute intractable headache, unspecified headache type [R51]       Final Discharge Diagnoses:  Hospital Problem List     Headache          Consults:   Consults     No orders found from 5/13/2017 to 6/12/2017.          Pertinent Test Results:   Lab Results (last 72 hours)     Procedure Component Value Units Date/Time    CBC & Differential [[id number]] Collected:  06/11/17 1539    Specimen:  Blood Updated:  06/11/17 1613    Narrative:       The following orders were created for panel order CBC & Differential.  Procedure                               Abnormality         Status                     ---------                               -----------         ------                     CBC Auto Differential[[id number]]        Abnormal            Final result                 Please view results for these tests on the individual orders.    CBC Auto Differential [[id number]]  (Abnormal) Collected:  06/11/17 1539    Specimen:  Blood Updated:  06/11/17 1613     WBC 11.22 (H) 10*3/mm3      RBC 4.91 10*6/mm3      Hemoglobin 15.7 (H) g/dL      Hematocrit 45.8 (H) %      MCV 93.3 fL      MCH 32.0 pg      MCHC 34.3 g/dL      RDW 14.1 %      RDW-SD 48.1 (H) fl      MPV 10.7 fL      Platelets 241 10*3/mm3      Neutrophil % 49.7 %      Lymphocyte % 41.9 %      Monocyte % 5.6 %      Eosinophil % 2.2 %      Basophil % 0.3 %      Immature Grans % 0.3 %      Neutrophils, Absolute 5.58 10*3/mm3      Lymphocytes, Absolute 4.70 (H) 10*3/mm3      Monocytes, Absolute 0.63 10*3/mm3      Eosinophils, Absolute 0.25 10*3/mm3      Basophils, Absolute 0.03 10*3/mm3      Immature Grans, Absolute 0.03 (H) 10*3/mm3      nRBC 0.0 /100 WBC     Comprehensive  Metabolic Panel [[id number]]  (Abnormal) Collected:  06/11/17 1539    Specimen:  Blood Updated:  06/11/17 1617     Glucose 128 (H) mg/dL      BUN 9 mg/dL      Creatinine 0.69 mg/dL      Sodium 139 mmol/L      Potassium 3.8 mmol/L      Chloride 108 mmol/L      CO2 20.0 (L) mmol/L      Calcium 9.4 mg/dL      Total Protein 7.4 g/dL      Albumin 4.10 g/dL      ALT (SGPT) 32 U/L      AST (SGOT) 15 U/L      Alkaline Phosphatase 70 U/L      Total Bilirubin 0.4 mg/dL      eGFR Non African Amer 92 mL/min/1.73      Globulin 3.3 gm/dL      A/G Ratio 1.2 g/dL      BUN/Creatinine Ratio 13.0     Anion Gap 11.0 mmol/L     CK [[id number]]  (Normal) Collected:  06/11/17 1539    Specimen:  Blood Updated:  06/11/17 1617     Creatine Kinase 58 U/L     Troponin [[id number]]  (Normal) Collected:  06/11/17 1539    Specimen:  Blood Updated:  06/11/17 1629     Troponin I <0.012 ng/mL     CK-MB [[id number]]  (Normal) Collected:  06/11/17 1539    Specimen:  Blood Updated:  06/11/17 1629     CKMB 1.10 ng/mL     Lavender Top [[id number]] Collected:  06/11/17 1539    Specimen:  Blood Updated:  06/11/17 1701     Extra Tube hold for add-on      Auto resulted       [place] Draw [[id number]] Collected:  06/11/17 1539    Specimen:  Blood Updated:  06/11/17 1701    Narrative:       The following orders were created for panel order [place] Draw.  Procedure                               Abnormality         Status                     ---------                               -----------         ------                     Light Blue Top[[id number]]                                   Final result               Green Top (Gel)[[id number]]                                  Final result               Lavender Top[[id number]]                                     Final result               Gold Top - SST[[id number]]                                   Final result                 Please view results for these tests on the individual orders.    Light Blue Top [[id number]]  Collected:  06/11/17 1539    Specimen:  Blood Updated:  06/11/17 1701     Extra Tube hold for add-on      Auto resulted       Green Top (Gel) [[id number]] Collected:  06/11/17 1539    Specimen:  Blood Updated:  06/11/17 1701     Extra Tube Hold for add-ons.      Auto resulted.       Gold Top - SST [[id number]] Collected:  06/11/17 1539    Specimen:  Blood Updated:  06/11/17 1701     Extra Tube Hold for add-ons.      Auto resulted.       Urinalysis With / Culture If Indicated [[id number]]  (Abnormal) Collected:  06/11/17 1711    Specimen:  Urine from Urine, Clean Catch Updated:  06/11/17 1719     Color, UA Yellow     Appearance, UA Cloudy (A)     pH, UA 8.0     Specific Gravity, UA 1.008     Glucose, UA Negative     Ketones, UA Negative     Bilirubin, UA Negative     Blood, UA Negative     Protein, UA Negative     Leuk Esterase, UA Negative     Nitrite, UA Negative     Urobilinogen, UA 0.2 E.U./dL    Narrative:       Urine microscopic not indicated.    Urine Drug Screen [[id number]]  (Abnormal) Collected:  06/11/17 1711    Specimen:  Urine from Urine, Clean Catch Updated:  06/11/17 1843     Amphetamine Screen, Urine Negative     Barbiturates Screen, Urine Negative     Benzodiazepine Screen, Urine Negative     Cocaine Screen, Urine Negative     Methadone Screen, Urine Negative     Opiate Screen Positive (A)     Oxycodone Screen, Urine Negative     THC, Screen, Urine Negative    Narrative:       Negative Thresholds For Drugs Screened in Urine:     Amphetamines          500 ng/ml  Barbiturates          200 ng/ml  Benzodiazepines       200 ng/ml  Cocaine               150 ng/ml  Methadone             300 ng/mL  Opiates               300 ng/mL  Oxycodone             100 ng/mL  THC                   20 ng/mL    The normal value for all drugs tested is negative. This report includes final unconfirmed screening results.  A positive result by this assay can be, at your request, sent to the Reference Lab for confirmation by gas  chromatography. Unconfirmed results must not be used for non-medical purposes, such as employment or legal testing. Clinical consideration should be applied to any drug of abuse test result, particularly when unconfirmed results are used.    Basic Metabolic Panel [[id number]]  (Abnormal) Collected:  06/11/17 1944    Specimen:  Blood Updated:  06/11/17 2000     Glucose 98 mg/dL      BUN 9 mg/dL      Creatinine 0.71 mg/dL      Sodium 141 mmol/L      Potassium 4.1 mmol/L      Chloride 108 mmol/L      CO2 20.0 (L) mmol/L      Calcium 9.3 mg/dL      eGFR Non African Amer 89 mL/min/1.73      BUN/Creatinine Ratio 12.7     Anion Gap 13.0 mmol/L     Troponin [[id number]]  (Normal) Collected:  06/11/17 1944    Specimen:  Blood Updated:  06/11/17 2011     Troponin I <0.012 ng/mL     Troponin [[id number]]  (Normal) Collected:  06/11/17 2124    Specimen:  Blood Updated:  06/11/17 2154     Troponin I <0.012 ng/mL     CBC & Differential [[id number]] Collected:  06/12/17 0515    Specimen:  Blood Updated:  06/12/17 0641    Narrative:       The following orders were created for panel order CBC & Differential.  Procedure                               Abnormality         Status                     ---------                               -----------         ------                     CBC Auto Differential[[id number]]        Abnormal            Final result                 Please view results for these tests on the individual orders.    CBC Auto Differential [[id number]]  (Abnormal) Collected:  06/12/17 0515    Specimen:  Blood Updated:  06/12/17 0641     WBC 9.71 10*3/mm3      RBC 4.92 10*6/mm3      Hemoglobin 15.4 g/dL      Hematocrit 46.7 (H) %      MCV 94.9 fL      MCH 31.3 pg      MCHC 33.0 g/dL      RDW 14.4 %      RDW-SD 49.8 (H) fl      MPV 11.1 fL      Platelets 246 10*3/mm3      Neutrophil % 41.2 %      Lymphocyte % 50.1 (H) %      Monocyte % 6.3 %      Eosinophil % 2.0 %      Basophil % 0.3 %      Immature Grans % 0.1 %       Neutrophils, Absolute 4.01 10*3/mm3      Lymphocytes, Absolute 4.86 (H) 10*3/mm3      Monocytes, Absolute 0.61 10*3/mm3      Eosinophils, Absolute 0.19 10*3/mm3      Basophils, Absolute 0.03 10*3/mm3      Immature Grans, Absolute 0.01 10*3/mm3     Basic Metabolic Panel [[id number]]  (Normal) Collected:  06/12/17 0515    Specimen:  Blood Updated:  06/12/17 0648     Glucose 98 mg/dL      BUN 12 mg/dL      Creatinine 0.77 mg/dL      Sodium 141 mmol/L      Potassium 3.9 mmol/L      Chloride 110 mmol/L      CO2 25.0 mmol/L      Calcium 9.6 mg/dL      eGFR Non African Amer 81 mL/min/1.73      BUN/Creatinine Ratio 15.6     Anion Gap 6.0 mmol/L     Lipid Panel [[id number]]  (Abnormal) Collected:  06/12/17 0515    Specimen:  Blood Updated:  06/12/17 0704     Total Cholesterol 201 (H) mg/dL      Triglycerides 262 (H) mg/dL      HDL Cholesterol 37 (L) mg/dL      LDL Cholesterol  139 (H) mg/dL      LDL/HDL Ratio 3.02    Blood Culture [[id number]]  (Normal) Collected:  06/11/17 2124    Specimen:  Blood from Hand, Right Updated:  06/12/17 2201     Blood Culture No growth at 24 hours    Blood Culture [[id number]]  (Normal) Collected:  06/11/17 2124    Specimen:  Blood from Arm, Right Updated:  06/12/17 2201     Blood Culture No growth at 24 hours    CBC & Differential [[id number]] Collected:  06/13/17 0517    Specimen:  Blood Updated:  06/13/17 0553    Narrative:       The following orders were created for panel order CBC & Differential.  Procedure                               Abnormality         Status                     ---------                               -----------         ------                     CBC Auto Differential[[id number]]        Abnormal            Final result                 Please view results for these tests on the individual orders.    CBC Auto Differential [[id number]]  (Abnormal) Collected:  06/13/17 0517    Specimen:  Blood Updated:  06/13/17 0553     WBC 13.72 (H) 10*3/mm3      RBC 5.29 (H) 10*6/mm3       Hemoglobin 16.9 (H) g/dL      Hematocrit 48.8 (H) %      MCV 92.2 fL      MCH 31.9 pg      MCHC 34.6 g/dL      RDW 14.5 %      RDW-SD 49.0 (H) fl      MPV 10.8 fL      Platelets 272 10*3/mm3      Neutrophil % 64.3 %      Lymphocyte % 28.3 %      Monocyte % 5.8 %      Eosinophil % 1.2 %      Basophil % 0.2 %      Immature Grans % 0.2 %      Neutrophils, Absolute 8.83 (H) 10*3/mm3      Lymphocytes, Absolute 3.88 10*3/mm3      Monocytes, Absolute 0.79 10*3/mm3      Eosinophils, Absolute 0.16 10*3/mm3      Basophils, Absolute 0.03 10*3/mm3      Immature Grans, Absolute 0.03 (H) 10*3/mm3     Basic Metabolic Panel [[id number]]  (Abnormal) Collected:  06/13/17 0517    Specimen:  Blood Updated:  06/13/17 0601     Glucose 92 mg/dL      BUN 11 mg/dL      Creatinine 0.67 mg/dL      Sodium 143 mmol/L      Potassium 4.1 mmol/L      Chloride 112 (H) mmol/L      CO2 21.0 (L) mmol/L      Calcium 9.1 mg/dL      eGFR Non African Amer 95 mL/min/1.73      BUN/Creatinine Ratio 16.4     Anion Gap 10.0 mmol/L         Imaging Results (last 72 hours)     Procedure Component Value Units Date/Time    XR Chest 1 View [[id number]] Collected:  06/11/17 1607     Updated:  06/11/17 1625    Narrative:       Patient Name:  RAMBO COTTER  Patient ID:  [id number]   Ordering:  GILES NOONAN  Attending:  ANNE HUTCHINSON  Referring:  GILES NOONAN  ------------------------------------------------    PORTABLE CHEST    HISTORY: Hypertension. Dizziness.    Portable AP upright film of the chest was obtained at 3:48 PM.  COMPARISON: October 3, 2015.    EKG leads in place.  The lungs are clear of an acute process.  The heart is not enlarged.  The pulmonary vasculature is not increased.  No pleural effusion.  No pneumothorax.  No acute osseous abnormality.      Impression:       CONCLUSION:  No Acute Disease    [id number]    Electronically signed by:  Joseph Lua MD  6/11/2017 4:25 PM CDT  Workstation: [id number]    CT Head Without Contrast  [[id number]] Collected:  06/11/17 1655     Updated:  06/11/17 1709    Narrative:       Patient Name:  RAMBO COTTER  Patient ID:  [id number]   Ordering:  GILES NOONAN  Attending:  ANNE HUTCHINSON  Referring:  GILES NOONAN  ------------------------------------------------    CT Head Without Contrast    History: Headache    Axial scans of the brain were obtained without intravenous  contrast.  Coronal and sagital reconstructions were preformed.    This exam was performed according to our departmental  dose-optimization program, which includes automated exposure  control, adjustment of the mA and/or kV according to patient size  and/or use of iterative reconstruction technique.    DLP: 907.40    Comparison: August 14, 2015.    Bone windows are unremarkable.  The visualized paranasal sinuses are unremarkable.    No hemorrhage.  No mass.  No abnormal areas of increased or decreased attenuation.  No midline shift.  No abnormal extra-axial fluid collections.      Impression:       CONCLUSION:  Normal nonenhanced CT of the brain.    [id number]    Electronically signed by:  Joseph Lua MD  6/11/2017 5:09 PM CDT  Workstation: [id number]    MRI Angiogram Neck With & Without Contrast [[id number]] Collected:  06/12/17 0725     Updated:  06/12/17 0936    Narrative:       Procedure: MRI angiogram neck with contrast.    Reason for exam: Headache, essential primary hypertension, TIA  versus CVA.    FINDINGS: Multiplanar multisequence MR imaging of the carotid  system with contrast with 3-D carotid arterial system imaging.  Motion during exam limits study somewhat.    The left vertebral artery is normal. The right vertebral artery  is normal. The left subclavian artery is normal. The left common  carotid artery is normal. The left internal carotid artery is  normal. The left external carotid artery is normal. The  brachiocephalic trunk is normal. The right subclavian artery is  normal. The right common carotid artery  is normal. The right  internal carotid artery is normal. The right external carotid  artery is normal.      Impression:       Normal MR angiogram study of the carotid arterial  systems.    Electronically signed by:  Gamaliel Decker MD  6/12/2017 9:36 AM CDT  Workstation: [id number]    MRI Brain With & Without Contrast [[id number]] Collected:  06/12/17 0724     Updated:  06/12/17 0943    Narrative:       Procedure: MR brain with and without contrast    Reason for exam: TIA versus CVA, headache, essential primary  hypertension.    FINDINGS: Multisequence multiplanar MR imaging of the brain was  performed with and without contrast. The diffusion weighted  series appears normal with no abnormal signal seen to suggest  restricted diffusion. Right frontal lobe deep white matter 2  small circular foci of increased signal on FLAIR sequence are  seen with these regions appearing mildly lower signal on T1  weighting and nonenhancing. The largest focus measures 5.5 mm in  greatest diameter. Left frontal lobe subcortical deep white  matter 2 mm circular focus of increased signal on FLAIR weighted  sequences seen. Otherwise cerebral and cerebellar parenchymal are  normal. There is no abnormal focus of enhancement seen.  Ventricular system and subarachnoid spaces are normal. The  pituitary is normal.      Impression:       1.  No acute intracranial abnormality.  2.  Bilateral frontal lobe deep white matter small high signal  lesions on FLAIR sequence with these lesions nonenhancing. These  lesions most likely represent small foci of chronic ischemic  gliosis secondary to microvascular disease versus sequela from  migraines versus less likely vasculitic disorder or demyelinating  process. Recommend clinical correlation.    Electronically signed by:  Gamaliel Decker MD  6/12/2017 9:43 AM CDT  Workstation: [id number]    US Carotid Bilateral [[id number]] Collected:  06/12/17 0908     Updated:  06/12/17 1013    Narrative:       PROCEDURE:  Bilateral duplex carotid and vertebral ultrasound.    INDICATION: Dizziness with near syncope. Headache. Essential  hypertension.    COMPARISON: MRA neck with contrast 6/12/2017.    RIGHT CAROTID BIFURCATION FINDINGS.    Peak systolic velocities in centimeters per second. Right CCA 78,  , . Right ICA/CCA peak systolic velocity ratio 1.3.    Real-time imaging demonstrates no plaque or stenosis in the right  carotid bifurcation arteries.    LEFT CAROTID BIFURCATION FINDINGS.    Peak systolic velocities in centimeters per second. Left CCA 92  and left . Left ICA/CCA peak systolic velocity ratio 1.2.    Real-time imaging demonstrates no plaques or stenosis in the left  carotid bifurcation arteries.    VERTEBRAL ARTERIES. There is antegrade flow through both  vertebral arteries.    Incidental finding of a solid 1.3 cm nodule mildly isoechoic  along the posterior margin of the right lobe thyroid midpole  area.      Impression:       Negative bilateral duplex carotid ultrasound with no  plaques or stenosis.    Antegrade flow through both vertebral arteries.    1.3 cm solid isoechoic nodule right lobe thyroid.    [id number]      Electronically signed by:  Edward Kauffman MD  6/12/2017 9:49  AM CDT Workstation: [email or web address]        Chief Complaint on Day of Discharge: No complaints    Hospital Course:  6/12/2017:  This is a 47-year-old lady that presented to the emergency room yesterday after reporting to the urgent care center earlier during the day for headache. Ultrasound of the carotid arteries was negative. MRI of the brain showed bilateral frontal lobe deep white matter small high signal lesions on flair sequence with these lesions nonenhancing most likely representing small foci of chronic ischemic gliosis secondary to microvascular disease versus sequela from migraines versus less likely vasculitic disorder or demyelinating process. A consult was put in with neurology SOC. The MRI was reviewed and  "the neurologist felt this is a chronic problem due to migraines. A recommendation of Compazine, Benadryl and Toradol was given. Due to the patient being allergic to Toradol, Compazine and Benadryl was given. The patient states she is very hesitant to be discharged because she is extremely dizzy. However, nursing staff indicates that the patient has left the floor on numerous occasions. I requested that the patient stay on the observation floor due to the dizziness, and we could observe her 1 more night while taken the Compazine and Benadryl and will most likely discharge the patient in the morning.     6/13/2017:  Patient states she feels much better today the headache is reported as mild.          Condition on Discharge:  Stable    Physical Exam on Discharge:  /68 (BP Location: Right arm, Patient Position: Lying)  Pulse 73  Temp 97.4 °F (36.3 °C) (Temporal Artery )   Resp 18  Ht 63.5\" (161.3 cm)  Wt 142 lb 6.4 oz (64.6 kg)  SpO2 97%  BMI 24.83 kg/m2  Physical Exam   Constitutional: She is oriented to person, place, and time. She appears well-developed and well-nourished.   HENT:   Head: Normocephalic and atraumatic.   Eyes: EOM are normal. Pupils are equal, round, and reactive to light.   Neck: Normal range of motion. Neck supple.   Cardiovascular: Normal rate and regular rhythm.    Pulmonary/Chest: Effort normal and breath sounds normal.   Abdominal: Soft. Bowel sounds are normal.   Musculoskeletal: Normal range of motion.   Neurological: She is alert and oriented to person, place, and time.   Skin: Skin is warm and dry.   Psychiatric: She has a normal mood and affect. Her behavior is normal.     Discharge Disposition:  Home or Self Care    Discharge Medications:   Vielka Pratt   Home Medication Instructions WALKER:[id number]    Printed on:06/13/17 123   Medication Information                      albuterol (PROAIR HFA) 108 (90 BASE) MCG/ACT inhaler  USE 2 PUFFS EVERY FOUR HOURS AS NEEDED " FOR WHEEZING             benzonatate (TESSALON) 100 MG capsule               Biotin 1000 MCG tablet  Take 1,000 mcg by mouth.             carvedilol (COREG) 12.5 MG tablet  Take 12.5 mg by mouth 2 (Two) Times a Day With Meals.             cetirizine (zyrTEC) 10 MG tablet  Take 1 tablet by mouth.             diazepam (VALIUM) 2 MG tablet               dicyclomine (BENTYL) 20 MG tablet  TAKE 1 TABLET BY MOUTH EVERY 6 HOURS.             DULoxetine (CYMBALTA) 60 MG capsule               estrogens, conjugated, (PREMARIN) 0.625 MG tablet  Take 0.625 mg by mouth.             fluticasone (FLONASE) 50 MCG/ACT nasal spray               gabapentin (NEURONTIN) 800 MG tablet               HYDROcodone-acetaminophen (NORCO) 7.5-325 MG per tablet  Take 1 tablet by mouth Every 6 (Six) Hours.             ketoconazole (NIZORAL) 2 % shampoo  Apply  topically.             levoFLOXacin (LEVAQUIN) 500 MG tablet  1 tablet daily by mouth             metFORMIN (GLUCOPHAGE) 500 MG tablet  Take 500 mg by mouth.             minocycline (MINOCIN,DYNACIN) 100 MG capsule  Take 100 mg by mouth 2 (Two) Times a Day.             montelukast (SINGULAIR) 10 MG tablet               ondansetron (ZOFRAN) 4 MG tablet  TAKE 1 TABLET BY MOUTH EVERY 8 HOURS AS NEEDED.             ondansetron (ZOFRAN) 4 MG tablet  Take 1 tablet by mouth Every 6 (Six) Hours As Needed for Nausea or Vomiting.             pantoprazole (PROTONIX) 20 MG EC tablet  Take 20 mg by mouth Daily. Pt unsure of dosage             pentazocine-naloxone (TALWIN NX) 50-0.5 MG per tablet  Take 1 tablet by mouth Every 4 (Four) Hours As Needed for Mild Pain (1-3).             predniSONE (DELTASONE) 10 MG tablet  2 tablets by mouth daily for 1 week, then 1 tablet daily for 1 week             PROAIR  (90 BASE) MCG/ACT inhaler               prochlorperazine (COMPAZINE) 10 MG tablet  Take 1 tablet by mouth Every 6 (Six) Hours As Needed for Nausea or Vomiting.             spironolactone  (ALDACTONE) 50 MG tablet  Take 50 mg by mouth Daily.             terbinafine (lamiSIL) 250 MG tablet  Take 250 mg by mouth Daily.             TROKENDI  MG capsule sustained-release 24 hr                   Discharge Diet:   Diet Instructions     Diet: Regular; Thin Liquids, No Restrictions       Discharge Diet:  Regular   Fluid Consistency:  Thin Liquids, No Restrictions                 Activity at Discharge:   Activity Instructions     Activity as Tolerated                     Discharge Care Plan/Instructions:  Patient is given a prescription for Compazine, and will take this with her Benadryl.  She will follow up in 2 days with Dr. Neal, her primary care physician, and he will monitor her blood pressure.  Blood pressure today remains stable at 116/68.      Follow-up Appointments:   Future Appointments  Date Time Provider Department Center   7/6/2017 8:30 AM Loki Garcia MD [facility] None       Test Results Pending at Discharge:  Order Current Status    Blood Culture Preliminary result    Blood Culture Preliminary result            This document has been electronically signed by ALISSON Limon on June 13, 2017 12:38 PM        Time: This discharge took less than 30 minutes      I personally evaluated and examined the patient in conjunction with KUSH Reeves and agree with the assessment, treatment plan, and disposition of the patient as recorded.   Lungs are clear

## 2017-06-16 LAB
BACTERIA SPEC AEROBE CULT: NORMAL
BACTERIA SPEC AEROBE CULT: NORMAL

## 2017-07-06 ENCOUNTER — OFFICE VISIT (OUTPATIENT)
Dept: ENDOCRINOLOGY | Facility: CLINIC | Age: 47
End: 2017-07-06

## 2017-07-06 ENCOUNTER — APPOINTMENT (OUTPATIENT)
Dept: LAB | Facility: HOSPITAL | Age: 47
End: 2017-07-06

## 2017-07-06 VITALS
WEIGHT: 141.6 LBS | BODY MASS INDEX: 25.09 KG/M2 | DIASTOLIC BLOOD PRESSURE: 80 MMHG | HEIGHT: 63 IN | HEART RATE: 77 BPM | SYSTOLIC BLOOD PRESSURE: 130 MMHG

## 2017-07-06 DIAGNOSIS — L68.0 HIRSUTISM: ICD-10-CM

## 2017-07-06 DIAGNOSIS — Z72.0 TOBACCO ABUSE DISORDER: ICD-10-CM

## 2017-07-06 DIAGNOSIS — L65.9 ALOPECIA: ICD-10-CM

## 2017-07-06 DIAGNOSIS — E27.0 ADRENAL HYPERACTIVITY (HCC): ICD-10-CM

## 2017-07-06 DIAGNOSIS — D75.1 SECONDARY POLYCYTHEMIA: ICD-10-CM

## 2017-07-06 DIAGNOSIS — R94.6 ABNORMAL THYROID FUNCTION TEST: Primary | ICD-10-CM

## 2017-07-06 DIAGNOSIS — R73.03 PREDIABETES: ICD-10-CM

## 2017-07-06 LAB
CORTIS AM PEAK SERPL-MCNC: 7.17 MCG/DL
IRON 24H UR-MRATE: 35 MCG/DL (ref 37–170)
IRON SATN MFR SERPL: 11 % (ref 15–50)
T3 SERPL-MCNC: 134 NG/DL (ref 97–169)
T4 FREE SERPL-MCNC: 0.93 NG/DL (ref 0.78–2.19)
TIBC SERPL-MCNC: 313 MCG/DL (ref 265–497)
TSH SERPL DL<=0.05 MIU/L-ACNC: 0.65 MIU/ML (ref 0.46–4.68)

## 2017-07-06 PROCEDURE — 82533 TOTAL CORTISOL: CPT | Performed by: INTERNAL MEDICINE

## 2017-07-06 PROCEDURE — 83540 ASSAY OF IRON: CPT | Performed by: INTERNAL MEDICINE

## 2017-07-06 PROCEDURE — 99244 OFF/OP CNSLTJ NEW/EST MOD 40: CPT | Performed by: INTERNAL MEDICINE

## 2017-07-06 PROCEDURE — 36415 COLL VENOUS BLD VENIPUNCTURE: CPT | Performed by: INTERNAL MEDICINE

## 2017-07-06 PROCEDURE — 84244 ASSAY OF RENIN: CPT | Performed by: INTERNAL MEDICINE

## 2017-07-06 PROCEDURE — 84439 ASSAY OF FREE THYROXINE: CPT | Performed by: INTERNAL MEDICINE

## 2017-07-06 PROCEDURE — 82024 ASSAY OF ACTH: CPT | Performed by: INTERNAL MEDICINE

## 2017-07-06 PROCEDURE — 83835 ASSAY OF METANEPHRINES: CPT | Performed by: INTERNAL MEDICINE

## 2017-07-06 PROCEDURE — 84445 ASSAY OF TSI GLOBULIN: CPT | Performed by: INTERNAL MEDICINE

## 2017-07-06 PROCEDURE — 84480 ASSAY TRIIODOTHYRONINE (T3): CPT | Performed by: INTERNAL MEDICINE

## 2017-07-06 PROCEDURE — 84403 ASSAY OF TOTAL TESTOSTERONE: CPT | Performed by: INTERNAL MEDICINE

## 2017-07-06 PROCEDURE — 82627 DEHYDROEPIANDROSTERONE: CPT | Performed by: INTERNAL MEDICINE

## 2017-07-06 PROCEDURE — 86376 MICROSOMAL ANTIBODY EACH: CPT | Performed by: INTERNAL MEDICINE

## 2017-07-06 PROCEDURE — 84591 ASSAY OF NOS VITAMIN: CPT | Performed by: INTERNAL MEDICINE

## 2017-07-06 PROCEDURE — 84443 ASSAY THYROID STIM HORMONE: CPT | Performed by: INTERNAL MEDICINE

## 2017-07-06 PROCEDURE — 83550 IRON BINDING TEST: CPT | Performed by: INTERNAL MEDICINE

## 2017-07-06 NOTE — PROGRESS NOTES
Vielka Pratt is a 47 y.o. female patient that     comes for direct consultation request from   for my opinion regarding     Chief Complaint   Patient presents with   • Thyroid Problem           Referring provider    Dr. Joseph Smith   Primary Care Provider    Nick Neal MD    46 yo  Comes for consultation for abnl thyroid tests characterized by TSH of 0.324 and low nl free t4 at 0.93  Context, having alopecia  Severity, moderate  Alleviating, has tried prednisone, aldactone that couldn't tolerate, nizoral shampoo without relief    Also on metformin for prediabetes    Has history of adrenal enlargement, has hirsutism       Past Medical History:   Diagnosis Date   • Abnormal granulation tissue    • Acute bacterial pharyngitis    • Acute tonsillitis, unspecified    • Allergic asthma     IgE-mediated allergic asthma      • Allergic rhinitis due to pollen    • Anxiety    • Asthma    • Back pain    • Backache    • Breast lump    • Cellulitis of trunk     Postoperative   • Chronic pain    • Corns and callus    • Cough    • Depressive disorder    • Diarrhea    • Dysphagia    • Dysuria    • Endometriosis    • Family history of malignant neoplasm of gastrointestinal tract    • GERD (gastroesophageal reflux disease)    • Hirsutism    • Hypertension    • Irritable bowel syndrome    • Leukorrhea, not specified as infective    • Mastitis    • Migraine    • Sebaceous cyst    • Shortness of breath    • Tick bite    • Tobacco abuse disorder 7/6/2017   • Tobacco user    • Upper respiratory infection      Family History   Problem Relation Age of Onset   • Cancer Other      other   • Colon cancer Other      Colorectal Cancer   • Diabetes Other    • Heart disease Other    • Hypertension Other    • Lung cancer Other      Social History   Substance Use Topics   • Smoking status: Current Every Day Smoker     Packs/day: 1.00   • Smokeless tobacco: Never Used   • Alcohol use Yes      Comment: Pt states  she drinks less than once a month         Current Outpatient Prescriptions:   •  albuterol (PROAIR HFA) 108 (90 BASE) MCG/ACT inhaler, USE 2 PUFFS EVERY FOUR HOURS AS NEEDED FOR WHEEZING, Disp: , Rfl:   •  Biotin 1000 MCG tablet, Take 1,000 mcg by mouth., Disp: , Rfl:   •  carvedilol (COREG) 12.5 MG tablet, Take 12.5 mg by mouth 2 (Two) Times a Day With Meals., Disp: , Rfl:   •  cetirizine (zyrTEC) 10 MG tablet, Take 1 tablet by mouth., Disp: , Rfl:   •  dicyclomine (BENTYL) 20 MG tablet, TAKE 1 TABLET BY MOUTH EVERY 6 HOURS., Disp: , Rfl:   •  DULoxetine (CYMBALTA) 60 MG capsule, , Disp: , Rfl:   •  estrogens, conjugated, (PREMARIN) 0.625 MG tablet, Take 0.625 mg by mouth., Disp: , Rfl:   •  fluticasone (FLONASE) 50 MCG/ACT nasal spray, , Disp: , Rfl:   •  gabapentin (NEURONTIN) 800 MG tablet, , Disp: , Rfl:   •  HYDROcodone-acetaminophen (NORCO) 7.5-325 MG per tablet, Take 1 tablet by mouth Every 6 (Six) Hours., Disp: , Rfl:   •  ketoconazole (NIZORAL) 2 % shampoo, Apply  topically., Disp: , Rfl:   •  metFORMIN (GLUCOPHAGE) 500 MG tablet, Take 500 mg by mouth 2 (Two) Times a Day With Meals., Disp: , Rfl:   •  montelukast (SINGULAIR) 10 MG tablet, , Disp: , Rfl:   •  pantoprazole (PROTONIX) 20 MG EC tablet, Take 20 mg by mouth Daily. Pt unsure of dosage, Disp: , Rfl:   •  pentazocine-naloxone (TALWIN NX) 50-0.5 MG per tablet, Take 1 tablet by mouth Every 4 (Four) Hours As Needed for Mild Pain (1-3)., Disp: , Rfl:   •  PROAIR  (90 BASE) MCG/ACT inhaler, , Disp: , Rfl:   •  TROKENDI  MG capsule sustained-release 24 hr, , Disp: , Rfl:   •  benzonatate (TESSALON) 100 MG capsule, , Disp: , Rfl:   •  levoFLOXacin (LEVAQUIN) 500 MG tablet, 1 tablet daily by mouth, Disp: 6 tablet, Rfl: 0  •  minocycline (MINOCIN,DYNACIN) 100 MG capsule, Take 100 mg by mouth 2 (Two) Times a Day., Disp: , Rfl:   •  ondansetron (ZOFRAN) 4 MG tablet, Take 1 tablet by mouth Every 6 (Six) Hours As Needed for Nausea or Vomiting.,  Disp: 10 tablet, Rfl: 0  •  prochlorperazine (COMPAZINE) 10 MG tablet, Take 1 tablet by mouth Every 6 (Six) Hours As Needed for Nausea or Vomiting., Disp: 30 tablet, Rfl: 0    Review of Systems    Review of Systems   Constitutional: Negative for activity change, appetite change, chills, diaphoresis, fatigue, fever and unexpected weight change.   HENT: Negative for congestion, dental problem, drooling, ear discharge, ear pain, facial swelling, mouth sores, postnasal drip, rhinorrhea, sinus pressure, sore throat, tinnitus, trouble swallowing and voice change.    Eyes: Negative for photophobia, pain, discharge, redness, itching and visual disturbance.   Respiratory: Negative for apnea, cough, choking, chest tightness, shortness of breath, wheezing and stridor.    Cardiovascular: Negative for chest pain, palpitations and leg swelling.   Gastrointestinal: Negative for abdominal distention, abdominal pain, constipation, diarrhea, nausea and vomiting.   Endocrine: Negative for cold intolerance, heat intolerance, polydipsia, polyphagia and polyuria.   Genitourinary: Negative for decreased urine volume, difficulty urinating, dysuria, flank pain, frequency, hematuria and urgency.   Musculoskeletal: Negative for arthralgias, back pain, gait problem, joint swelling, myalgias, neck pain and neck stiffness.   Skin: Negative for color change, pallor, rash and wound.        Alopecia,     Terminal hair in chin    Allergic/Immunologic: Negative for immunocompromised state.   Neurological: Negative for dizziness, tremors, seizures, syncope, facial asymmetry, speech difficulty, weakness, light-headedness, numbness and headaches.   Hematological: Negative for adenopathy.   Psychiatric/Behavioral: Negative for agitation, behavioral problems, confusion, decreased concentration, dysphoric mood, hallucinations, self-injury, sleep disturbance and suicidal ideas. The patient is not nervous/anxious and is not hyperactive.         Objective:  "  /80 (BP Location: Left arm, Patient Position: Sitting, Cuff Size: Adult)  Pulse 77  Ht 63\" (160 cm)  Wt 141 lb 9.6 oz (64.2 kg)  BMI 25.08 kg/m2    Physical Exam   Constitutional: She is oriented to person, place, and time. She appears well-developed.   HENT:   Head: Normocephalic.   Right Ear: External ear normal.   Left Ear: External ear normal.   Nose: Nose normal.   Eyes: Conjunctivae and EOM are normal. No scleral icterus.   Neck: Normal range of motion. Neck supple. No tracheal deviation present. No thyromegaly present.   Cardiovascular: Normal rate, regular rhythm, normal heart sounds and intact distal pulses.  Exam reveals no gallop and no friction rub.    No murmur heard.  Pulmonary/Chest: Effort normal and breath sounds normal. No stridor. No respiratory distress. She has no wheezes. She has no rales. She exhibits no tenderness.   Abdominal: Soft. Bowel sounds are normal. She exhibits no distension and no mass. There is no tenderness. There is no rebound and no guarding.   Musculoskeletal: Normal range of motion. She exhibits no tenderness or deformity.   Lymphadenopathy:     She has no cervical adenopathy.   Neurological: She is alert and oriented to person, place, and time. She displays normal reflexes. She exhibits normal muscle tone. Coordination normal.   Skin: No rash noted. No erythema. No pallor.   Recently shaved terminal chin and upper lip hair    Psychiatric: She has a normal mood and affect. Her behavior is normal. Judgment and thought content normal.       Lab Review            Assessment/Plan       ICD-10-CM ICD-9-CM   1. Abnormal thyroid function test R94.6 794.5   2. Alopecia L65.9 704.00   3. Hirsutism L68.0 704.1   4. Secondary polycythemia D75.1 289.0   5. Tobacco abuse disorder Z72.0 305.1   6. Prediabetes R73.03 790.29   7. Adrenal hyperactivity E27.0 255.3       Alopecia and hirsutism    Has had ERLIN and bl oophorectomy, has history of adrenal disorder discovered while " imaging her kidneys?   Obtain androgen assessment, cortisol, saliva cortisol  Consider LDDST and imaging adrenals       TSH , mildly depressed , could be biotin related, repeat today and if abnormal  1 w after being off biotin  Even if consistent , no need to treat as long as TSH doesn't suppress below 0.1    FLORENCE documented neg    Stop smoking for hair loss , sec polycythemia and all associated harm     Prediabetes, on metformin 500 mg po bid , May 2017 , Aic 5.7       I reviewed and summarized records from Nick Neal MD and  from 2017 and I reviewed / ordered labs.     Orders Placed This Encounter   Procedures   • Iron Profile   • Testosterone, Total, Women & Children   • DHEA-Sulfate   • TSH   • T4, Free   • T3   • Thyroid Stimulating Immunoglobulin   • Thyroid Peroxidase Antibody   • Vitamin B7 (Biotin)   • Aldosterone / Renin Ratio   • Metanephrines, Frac. Free, Plasma   • ACTH   • Cortisol - AM   • Salivary Cortisol X3, Timed     Collect at 11 p.m. For 3 consecutive nights          A copy of my note was sent to Nick Neal MD and      Please see my above opinion and suggestions.

## 2017-07-07 LAB — ACTH PLAS-MCNC: 10.3 PG/ML (ref 7.2–63.3)

## 2017-07-09 LAB
TESTOST SERPL-MCNC: 25.4 NG/DL
THYROPEROXIDASE AB SERPL-ACNC: 21 IU/ML (ref 0–34)

## 2017-07-12 LAB
ALDOST SERPL-MCNC: 4.7 NG/DL (ref 0–30)
ALDOST/RENIN PLAS-RTO: 4.2 {RATIO} (ref 0–30)
RENIN PLAS-CCNC: 1.11 NG/ML/HR (ref 0.17–5.38)

## 2017-07-13 LAB
METANEPHRINE, PL: 22 PG/ML (ref 0–62)
NORMETANEPHRINE, PL: 74 PG/ML (ref 0–145)

## 2017-07-14 ENCOUNTER — APPOINTMENT (OUTPATIENT)
Dept: LAB | Facility: HOSPITAL | Age: 47
End: 2017-07-14

## 2017-07-14 LAB — VITAMIN B7: 4.7 NG/ML (ref 0.05–0.83)

## 2017-07-14 PROCEDURE — 82533 TOTAL CORTISOL: CPT | Performed by: INTERNAL MEDICINE

## 2017-07-16 LAB
DHEA-S SERPL-MCNC: 71.3 UG/DL (ref 41.2–243.7)
TSI ACT/NOR SER: 92 % (ref 0–139)

## 2017-07-20 RX ORDER — FLUCONAZOLE 100 MG/1
100 TABLET ORAL DAILY
Qty: 7 TABLET | Refills: 0 | Status: SHIPPED | OUTPATIENT
Start: 2017-07-20 | End: 2017-11-16

## 2017-07-21 LAB
CORTIS SAL-MCNC: <0.01 UG/DL
SALIVARY CORTISOL #2: <0.01 UG/DL
SALIVARY CORTISOL #3: <0.01 UG/DL

## 2017-09-19 ENCOUNTER — OFFICE VISIT (OUTPATIENT)
Dept: PULMONOLOGY | Facility: CLINIC | Age: 47
End: 2017-09-19

## 2017-09-19 VITALS
DIASTOLIC BLOOD PRESSURE: 78 MMHG | SYSTOLIC BLOOD PRESSURE: 127 MMHG | HEIGHT: 63 IN | OXYGEN SATURATION: 99 % | HEART RATE: 79 BPM | WEIGHT: 135 LBS | BODY MASS INDEX: 23.92 KG/M2

## 2017-09-19 DIAGNOSIS — J45.32 MILD PERSISTENT ASTHMA WITH STATUS ASTHMATICUS: Primary | ICD-10-CM

## 2017-09-19 DIAGNOSIS — J20.9 ACUTE BRONCHITIS, UNSPECIFIED ORGANISM: ICD-10-CM

## 2017-09-19 PROCEDURE — 99214 OFFICE O/P EST MOD 30 MIN: CPT | Performed by: INTERNAL MEDICINE

## 2017-09-19 PROCEDURE — 96372 THER/PROPH/DIAG INJ SC/IM: CPT | Performed by: INTERNAL MEDICINE

## 2017-09-19 RX ORDER — SUMATRIPTAN 50 MG/1
50 TABLET, FILM COATED ORAL
COMMUNITY
Start: 2017-07-10

## 2017-09-19 RX ORDER — CEPHALEXIN 500 MG/1
500 CAPSULE ORAL 2 TIMES DAILY
COMMUNITY
End: 2017-11-16

## 2017-09-19 RX ORDER — PREDNISONE 10 MG/1
TABLET ORAL
Qty: 21 TABLET | Refills: 0 | Status: SHIPPED | OUTPATIENT
Start: 2017-09-19 | End: 2017-11-16

## 2017-09-19 RX ORDER — ESTRADIOL 1 MG/1
1 TABLET ORAL
COMMUNITY
Start: 2017-07-26 | End: 2018-03-27

## 2017-09-19 RX ORDER — METHYLPREDNISOLONE ACETATE 40 MG/ML
80 INJECTION, SUSPENSION INTRA-ARTICULAR; INTRALESIONAL; INTRAMUSCULAR; SOFT TISSUE ONCE
Status: COMPLETED | OUTPATIENT
Start: 2017-09-19 | End: 2017-09-19

## 2017-09-19 RX ADMIN — METHYLPREDNISOLONE ACETATE 80 MG: 40 INJECTION, SUSPENSION INTRA-ARTICULAR; INTRALESIONAL; INTRAMUSCULAR; SOFT TISSUE at 10:50

## 2017-09-19 NOTE — PROGRESS NOTES
"This lady has asthma, chronic bronchitis, persistent tobacco use.  She complains of cough wheeze shortness of breath and discolored sputum for several days.  She denies chills fever or chest pain    ROS    Constitutional-no night sweats weight loss headaches  GI no abdominal pain nausea or diarrhea  Neuro no seizure or neurologic deficits  Musculoskeletal no deformity or joint pain   no dysuria or hematuria  Skin no rash or other lesions  All other systems reviewed and were negative except for the above.      Physical Exam  /78  Pulse 79  Ht 63\" (160 cm)  Wt 135 lb (61.2 kg)  SpO2 99%  BMI 23.91 kg/m2  Vital signs as above  Pupils equally round and reactive to light and accommodation, neck no JVD or adenopathy.  Cardiovascular regular rhythm and rate no murmur or gallop.  Abdomen soft no organomegaly tenderness.  Extremities no clubbing cyanosis or edema.  No cervical adenopathy.  No skin rash.  Neurologic good strength bilaterally without deficits  Dyspneic white female with active rhonchi and wheezes    Impression acute bronchitis with status asthmaticus, persistent tobacco use    Recommendations Depo-Medrol, breo, continue antibiotics, prednisone if needed, refrain from smoking, return in 2 weeks          This document has been electronically signed by Percy Sanders MD on September 19, 2017 10:36 AM      "

## 2017-10-09 ENCOUNTER — TELEPHONE (OUTPATIENT)
Dept: PULMONOLOGY | Facility: CLINIC | Age: 47
End: 2017-10-09

## 2017-10-09 NOTE — TELEPHONE ENCOUNTER
Patient called and said she started an antibiotic and now has a yeast infection and wanted to know if Dr. Sanders would call her something in to [facility] for that.

## 2017-10-25 ENCOUNTER — OFFICE VISIT (OUTPATIENT)
Dept: PULMONOLOGY | Facility: CLINIC | Age: 47
End: 2017-10-25

## 2017-10-25 VITALS
OXYGEN SATURATION: 99 % | BODY MASS INDEX: 24.45 KG/M2 | HEART RATE: 87 BPM | HEIGHT: 63 IN | SYSTOLIC BLOOD PRESSURE: 122 MMHG | WEIGHT: 138 LBS | DIASTOLIC BLOOD PRESSURE: 74 MMHG

## 2017-10-25 DIAGNOSIS — J45.41 MODERATE PERSISTENT ASTHMA WITH ACUTE EXACERBATION: Primary | ICD-10-CM

## 2017-10-25 PROCEDURE — 96372 THER/PROPH/DIAG INJ SC/IM: CPT | Performed by: INTERNAL MEDICINE

## 2017-10-25 PROCEDURE — 99213 OFFICE O/P EST LOW 20 MIN: CPT | Performed by: INTERNAL MEDICINE

## 2017-10-25 RX ORDER — LISINOPRIL 10 MG/1
10 TABLET ORAL
COMMUNITY
Start: 2017-10-05 | End: 2018-03-27

## 2017-10-25 RX ORDER — ALBUTEROL SULFATE 90 UG/1
AEROSOL, METERED RESPIRATORY (INHALATION)
Qty: 1 INHALER | Refills: 5 | Status: SHIPPED | OUTPATIENT
Start: 2017-10-25

## 2017-10-25 RX ORDER — METHYLPREDNISOLONE ACETATE 80 MG/ML
80 INJECTION, SUSPENSION INTRA-ARTICULAR; INTRALESIONAL; INTRAMUSCULAR; SOFT TISSUE ONCE
Status: COMPLETED | OUTPATIENT
Start: 2017-10-25 | End: 2017-10-25

## 2017-10-25 RX ORDER — LEVOFLOXACIN 500 MG/1
TABLET, FILM COATED ORAL
Qty: 6 TABLET | Refills: 0 | Status: SHIPPED | OUTPATIENT
Start: 2017-10-25 | End: 2017-11-16

## 2017-10-25 RX ADMIN — METHYLPREDNISOLONE ACETATE 80 MG: 80 INJECTION, SUSPENSION INTRA-ARTICULAR; INTRALESIONAL; INTRAMUSCULAR; SOFT TISSUE at 15:05

## 2017-10-25 NOTE — PROGRESS NOTES
"This lady is a smoker with chronic bronchitis and asthma.  She has frequent exacerbations and complains of cough purulent sputum and shortness of breath.    ROS    Constitutional-no night sweats weight loss headaches  GI no abdominal pain nausea or diarrhea  Neuro no seizure or neurologic deficits  Musculoskeletal no deformity or joint pain   no dysuria or hematuria  Skin no rash or other lesions  All other systems reviewed and were negative except for the above.      Physical Exam  /74  Pulse 87  Ht 63\" (160 cm)  Wt 138 lb (62.6 kg)  SpO2 99%  BMI 24.45 kg/m2  Vital signs as above  Pupils equally round and reactive to light and accommodation, neck no JVD or adenopathy.  Cardiovascular regular rhythm and rate no murmur or gallop.  Abdomen soft no organomegaly tenderness.  Extremities no clubbing cyanosis or edema.  No cervical adenopathy.  No skin rash.  Neurologic good strength bilaterally without deficits  Dyspneic white female with scattered rhonchi, there is a subcutaneous lesion left neck    Impression asthma with exacerbation, chronic bronchitis, persistent tobacco use, skin lesion left neck    Depo-Medrol, ENT referral, continue present medications, refrain from smoking, return in 1 month          This document has been electronically signed by Percy Sanders MD on October 25, 2017 2:58 PM      "

## 2017-11-16 ENCOUNTER — OFFICE VISIT (OUTPATIENT)
Dept: OTOLARYNGOLOGY | Facility: CLINIC | Age: 47
End: 2017-11-16

## 2017-11-16 VITALS — WEIGHT: 142 LBS | BODY MASS INDEX: 25.16 KG/M2 | HEIGHT: 63 IN

## 2017-11-16 DIAGNOSIS — D48.5 NEOPLASM OF UNCERTAIN BEHAVIOR OF SKIN OF NECK: Primary | ICD-10-CM

## 2017-11-16 PROCEDURE — 99203 OFFICE O/P NEW LOW 30 MIN: CPT | Performed by: OTOLARYNGOLOGY

## 2017-11-17 NOTE — PROGRESS NOTES
Subjective   Vielka Pratt is a 47 y.o. female.     History of Present Illness     Patient reports she noticed a lesion on her left neck approximately 3 months ago.  Was seen at Dr. Joseph Smith's office and apparently underwent incision and drainage of this lesion.  Subsequently underwent what the patient describes as an attempted removal.  States that even after attempted removal there is a mass present.  It's not bleeding or draining at this point.  Is not particularly painful.  Never had anything like this before.  Nothing in particular brought this on.    The following portions of the patient's history were reviewed and updated as appropriate: allergies, current medications, past family history, past medical history, past social history, past surgical history and problem list.      Vielka Pratt reports that she has been smoking.  She has been smoking about 1.00 pack per day. She has never used smokeless tobacco. She reports that she drinks alcohol. She reports that she does not use illicit drugs.  Patient is a tobacco user and has not been counseled for use of tobacco products    Family History   Problem Relation Age of Onset   • Cancer Other      other   • Colon cancer Other      Colorectal Cancer   • Diabetes Other    • Heart disease Other    • Hypertension Other    • Lung cancer Other        Allergies   Allergen Reactions   • Contrast Dye Hives   • Iodinated Diagnostic Agents Hives and Rash   • Naproxen Rash   • Penicillins Rash   • Sulfur Rash   • Tetracyclines & Related Rash         Current Outpatient Prescriptions:   •  albuterol (PROAIR HFA) 108 (90 BASE) MCG/ACT inhaler, USE 2 PUFFS EVERY FOUR HOURS AS NEEDED FOR WHEEZING, Disp: , Rfl:   •  albuterol (VENTOLIN HFA) 108 (90 Base) MCG/ACT inhaler, 2 puffs every 4 hours as needed for breathing, Disp: 1 inhaler, Rfl: 5  •  Biotin 1000 MCG tablet, Take 1,000 mcg by mouth., Disp: , Rfl:   •  BREO ELLIPTA 200-25 MCG/INH aerosol powder ,  Inhale 1 spray Daily., Disp: 1 each, Rfl: 5  •  carvedilol (COREG) 12.5 MG tablet, Take 12.5 mg by mouth 2 (Two) Times a Day With Meals., Disp: , Rfl:   •  dicyclomine (BENTYL) 20 MG tablet, TAKE 1 TABLET BY MOUTH EVERY 6 HOURS., Disp: , Rfl:   •  DULoxetine (CYMBALTA) 60 MG capsule, , Disp: , Rfl:   •  estradiol (ESTRACE) 1 MG tablet, Take 1 mg by mouth., Disp: , Rfl:   •  gabapentin (NEURONTIN) 800 MG tablet, , Disp: , Rfl:   •  HYDROcodone-acetaminophen (NORCO) 7.5-325 MG per tablet, Take 1 tablet by mouth Every 6 (Six) Hours., Disp: , Rfl:   •  lisinopril (PRINIVIL,ZESTRIL) 10 MG tablet, Take 10 mg by mouth., Disp: , Rfl:   •  metFORMIN (GLUCOPHAGE) 500 MG tablet, Take 500 mg by mouth 2 (Two) Times a Day With Meals., Disp: , Rfl:   •  minocycline (MINOCIN,DYNACIN) 100 MG capsule, Take 100 mg by mouth 2 (Two) Times a Day., Disp: , Rfl:   •  montelukast (SINGULAIR) 10 MG tablet, , Disp: , Rfl:   •  ondansetron (ZOFRAN) 4 MG tablet, Take 1 tablet by mouth Every 6 (Six) Hours As Needed for Nausea or Vomiting., Disp: 10 tablet, Rfl: 0  •  pantoprazole (PROTONIX) 20 MG EC tablet, Take 20 mg by mouth Daily. Pt unsure of dosage, Disp: , Rfl:   •  PROAIR  (90 BASE) MCG/ACT inhaler, , Disp: , Rfl:   •  SUMAtriptan (IMITREX) 50 MG tablet, Take 50 mg by mouth., Disp: , Rfl:   •  TROKENDI  MG capsule sustained-release 24 hr, , Disp: , Rfl:   •  ketoconazole (NIZORAL) 2 % shampoo, Apply  topically., Disp: , Rfl:     Past Medical History:   Diagnosis Date   • Abnormal granulation tissue    • Acute bacterial pharyngitis    • Acute tonsillitis, unspecified    • Allergic asthma     IgE-mediated allergic asthma      • Allergic rhinitis due to pollen    • Anxiety    • Asthma    • Back pain    • Backache    • Breast lump    • Cellulitis of trunk     Postoperative   • Chronic pain    • Corns and callus    • Cough    • Depressive disorder    • Diarrhea    • Dysphagia    • Dysuria    • Endometriosis    • Family history of  malignant neoplasm of gastrointestinal tract    • GERD (gastroesophageal reflux disease)    • Hirsutism    • Hypertension    • Irritable bowel syndrome    • Leukorrhea, not specified as infective    • Mastitis    • Migraine    • Sebaceous cyst    • Shortness of breath    • Tick bite    • Tobacco abuse disorder 7/6/2017   • Tobacco user    • Upper respiratory infection          Review of Systems   HENT: Positive for voice change.    Respiratory: Positive for cough and wheezing.    Endocrine: Positive for heat intolerance.        Hot flashes   Genitourinary:        Heartburn   Neurological: Positive for headaches.   All other systems reviewed and are negative.          Objective   Physical Exam  General: Well-developed well-nourished female in no acute distress.  Alert and oriented ×3. Head: Normocephalic. Face: Symmetrical strength and appearance. PERRL. EOMI. Voice:Strong. Speech:Fluent  Ears: External ears no deformity, canals no discharge, tympanic membranes intact clear and mobile bilaterally.  Nose: Nares show no discharge mass polyp or purulence.  Boggy mucosa is present.  No gross external deformity.  Septum: Midline  Oral cavity: Lips and gums without lesions.  Tongue and floor of mouth without lesions.  Parotid and submandibular ducts unobstructed.  No mucosal lesions on the buccal mucosa or vestibule of the mouth.  Pharynx: No erythema exudate mass or ulcer  Neck: No lymphadenopathy.  No thyromegaly.  Trachea and larynx midline.  No masses in the parotid or submandibular glands.  Skin: 6 x 4 mm subcutaneous mass in the left neck just anterior to the jugular chain.  Does not appear to have any deep extension and is not associated with any lymphadenopathy or other deep mass in the neck.        Assessment/Plan   Vielka was seen today for neck mass.    Diagnoses and all orders for this visit:    Neoplasm of uncertain behavior of skin of neck        PLan: Offered excision of this mass for both diagnostic and  therapeutic purposes.  I explained the nature of this procedure to her including use of local anesthetic and risks including bleeding, infection, poor healing, poor appearance, numbness, recurrence, and possible need for further treatment depending on final pathology.  Proposed benefit would be definitive diagnosis and definitive treatment in the alternative would be observation.  Patient voices understanding of all the above and wishes to proceed.  This was scheduled as an office procedure in the near future.

## 2017-12-06 ENCOUNTER — PROCEDURE VISIT (OUTPATIENT)
Dept: OTOLARYNGOLOGY | Facility: CLINIC | Age: 47
End: 2017-12-06

## 2017-12-06 VITALS — RESPIRATION RATE: 17 BRPM | HEIGHT: 63 IN | BODY MASS INDEX: 25.16 KG/M2 | WEIGHT: 141.98 LBS

## 2017-12-06 DIAGNOSIS — IMO0002 CYST OF NECK: Primary | ICD-10-CM

## 2017-12-06 PROCEDURE — 12041 INTMD RPR N-HF/GENIT 2.5CM/<: CPT | Performed by: OTOLARYNGOLOGY

## 2017-12-06 PROCEDURE — 88312 SPECIAL STAINS GROUP 1: CPT | Performed by: PATHOLOGY

## 2017-12-06 PROCEDURE — 88304 TISSUE EXAM BY PATHOLOGIST: CPT | Performed by: PATHOLOGY

## 2017-12-06 PROCEDURE — 88304 TISSUE EXAM BY PATHOLOGIST: CPT | Performed by: OTOLARYNGOLOGY

## 2017-12-06 PROCEDURE — 88312 SPECIAL STAINS GROUP 1: CPT | Performed by: OTOLARYNGOLOGY

## 2017-12-06 PROCEDURE — 11421 EXC H-F-NK-SP B9+MARG 0.6-1: CPT | Performed by: OTOLARYNGOLOGY

## 2017-12-07 LAB
LAB AP CASE REPORT: NORMAL
LAB AP CLINICAL INFORMATION: NORMAL
LAB AP DIAGNOSIS COMMENT: NORMAL
LAB AP SPECIAL STAINS: NORMAL
Lab: NORMAL
PATH REPORT.FINAL DX SPEC: NORMAL
PATH REPORT.GROSS SPEC: NORMAL

## 2017-12-08 NOTE — PROGRESS NOTES
Procedure note    Preop diagnosis: cyst left neck    Postop diagnosis: Same    Procedure: Excision of cyst left neck 1.0 cm margin with 2.4 cm layered closure    Surgeon: Dr. Chakraborty    Anesthesia: 1% Xylocaine with epinephrine    Indications for procedure: Patient has a lesion of the left neck that is consistent with a previously drained cyst that still has a residual palpable abnormality    Description of procedure: After once again explaining the nature of the procedure to the patient in layman's terms including risks of bleeding, infection, poor healing, numbness, poor appearance, recurrence, and possible need for further treatment planning on final pathology versus the benefit of definitive diagnosis and definitive treatment in the alternative of observation, informed consent was confirmed.  Skin around the lesion was cleansed with alcohol and elliptical incision was designed and infiltrated with 1% Xylocaine with epinephrine.  This was incised sharply full-thickness carried down through the subcutaneous tissues and the palpable abnormality was completely excised grossly.  The wound was irrigated with saline and the subcutaneous tissues were closed with interrupted 5-0 Vicryl skin was closed with running 5-0 nylon.  Bacitracin ointment was applied.  Procedure was terminated.  Patient proceeded well and was instructed local wound care and advised to return in 1 week

## 2017-12-13 ENCOUNTER — OFFICE VISIT (OUTPATIENT)
Dept: OTOLARYNGOLOGY | Facility: CLINIC | Age: 47
End: 2017-12-13

## 2017-12-13 VITALS — HEIGHT: 63 IN | OXYGEN SATURATION: 98 % | WEIGHT: 141.98 LBS | BODY MASS INDEX: 25.16 KG/M2 | HEART RATE: 78 BPM

## 2017-12-13 DIAGNOSIS — Z48.817 AFTERCARE FOLLOWING SURGERY OF THE SKIN OR SUBCUTANEOUS TISSUE: Primary | ICD-10-CM

## 2017-12-13 PROCEDURE — 99024 POSTOP FOLLOW-UP VISIT: CPT | Performed by: OTOLARYNGOLOGY

## 2017-12-13 NOTE — PROGRESS NOTES
Subjective   Vielka Pratt is a 47 y.o. female.       History of Present Illness     Patient returns status post excision of a lesion of the left neck.  She had previously had what was presumed to be a cyst in this area that had been drained.  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  She reports it's not giving her any particular problems.    The following portions of the patient's history were reviewed and updated as appropriate: allergies, current medications, past family history, past medical history, past social history, past surgical history and problem list.     reports that she has been smoking.  She has been smoking about 1.00 pack per day. She has never used smokeless tobacco. She reports that she drinks alcohol. She reports that she does not use illicit drugs.   Patient is a tobacco user and has been counseled for use of tobacco products      Review of Systems        Objective   Physical Exam  Neck incision intact no evidence of infection.  All external sutures removed      Assessment/Plan   Vielka was seen today for post-op.    Diagnoses and all orders for this visit:    Aftercare following surgery of the skin or subcutaneous tissue      Plan: Sutures removed as described above.  May follow me as needed.

## 2017-12-29 ENCOUNTER — TELEPHONE (OUTPATIENT)
Dept: PULMONOLOGY | Facility: CLINIC | Age: 47
End: 2017-12-29

## 2017-12-29 NOTE — TELEPHONE ENCOUNTER
----- Message from Sanjana Perez Do, MA sent at 12/29/2017 12:38 PM CST -----  Regarding: MEDS FOR SINUS  Contact: [phone]  Vielka Hugo called in regards to getting a prescription other than Flonase.  She stated that her sinuses are completely dried up and her allergies are acting up.     If you could give her a call at your earliest convenience, that would be great!    Please and Thank you!    Sanjana

## 2018-03-27 RX ORDER — OXYBUTYNIN CHLORIDE 5 MG/1
5 TABLET ORAL 3 TIMES DAILY
COMMUNITY
End: 2020-06-03

## 2018-03-27 RX ORDER — ESCITALOPRAM OXALATE 20 MG/1
20 TABLET ORAL DAILY
COMMUNITY
End: 2020-06-03

## 2018-03-27 RX ORDER — CELECOXIB 200 MG/1
200 CAPSULE ORAL 2 TIMES DAILY
COMMUNITY
End: 2021-05-19

## 2018-03-30 RX ORDER — DEXTROSE AND SODIUM CHLORIDE 5; .45 G/100ML; G/100ML
30 INJECTION, SOLUTION INTRAVENOUS CONTINUOUS
Status: CANCELLED | OUTPATIENT
Start: 2018-04-02

## 2018-04-02 ENCOUNTER — ANESTHESIA (OUTPATIENT)
Dept: GASTROENTEROLOGY | Facility: HOSPITAL | Age: 48
End: 2018-04-02

## 2018-04-02 ENCOUNTER — ANESTHESIA EVENT (OUTPATIENT)
Dept: GASTROENTEROLOGY | Facility: HOSPITAL | Age: 48
End: 2018-04-02

## 2018-04-02 ENCOUNTER — HOSPITAL ENCOUNTER (OUTPATIENT)
Facility: HOSPITAL | Age: 48
Setting detail: HOSPITAL OUTPATIENT SURGERY
Discharge: HOME | End: 2018-04-02
Attending: INTERNAL MEDICINE | Admitting: INTERNAL MEDICINE

## 2018-04-02 VITALS
HEART RATE: 63 BPM | WEIGHT: 138.01 LBS | OXYGEN SATURATION: 98 % | SYSTOLIC BLOOD PRESSURE: 123 MMHG | HEIGHT: 63 IN | RESPIRATION RATE: 16 BRPM | BODY MASS INDEX: 24.45 KG/M2 | DIASTOLIC BLOOD PRESSURE: 66 MMHG | TEMPERATURE: 97.9 F

## 2018-04-02 DIAGNOSIS — K58.1 IRRITABLE BOWEL SYNDROME WITH CONSTIPATION: ICD-10-CM

## 2018-04-02 DIAGNOSIS — K21.9 GASTRIC REFLUX: ICD-10-CM

## 2018-04-02 PROCEDURE — 88305 TISSUE EXAM BY PATHOLOGIST: CPT | Performed by: INTERNAL MEDICINE

## 2018-04-02 PROCEDURE — 88305 TISSUE EXAM BY PATHOLOGIST: CPT | Performed by: PATHOLOGY

## 2018-04-02 PROCEDURE — 25010000002 PROPOFOL 10 MG/ML EMULSION: Performed by: NURSE ANESTHETIST, CERTIFIED REGISTERED

## 2018-04-02 RX ORDER — DEXTROSE AND SODIUM CHLORIDE 5; .45 G/100ML; G/100ML
30 INJECTION, SOLUTION INTRAVENOUS CONTINUOUS
Status: DISCONTINUED | OUTPATIENT
Start: 2018-04-02 | End: 2018-04-02 | Stop reason: HOSPADM

## 2018-04-02 RX ORDER — PROPOFOL 10 MG/ML
VIAL (ML) INTRAVENOUS AS NEEDED
Status: DISCONTINUED | OUTPATIENT
Start: 2018-04-02 | End: 2018-04-02 | Stop reason: SURG

## 2018-04-02 RX ADMIN — PROPOFOL 50 MG: 10 INJECTION, EMULSION INTRAVENOUS at 15:20

## 2018-04-02 RX ADMIN — DEXTROSE AND SODIUM CHLORIDE 30 ML/HR: 5; 450 INJECTION, SOLUTION INTRAVENOUS at 14:58

## 2018-04-02 RX ADMIN — PROPOFOL 50 MG: 10 INJECTION, EMULSION INTRAVENOUS at 15:28

## 2018-04-02 RX ADMIN — PROPOFOL 20 MG: 10 INJECTION, EMULSION INTRAVENOUS at 15:34

## 2018-04-02 RX ADMIN — PROPOFOL 50 MG: 10 INJECTION, EMULSION INTRAVENOUS at 15:24

## 2018-04-02 NOTE — ANESTHESIA PREPROCEDURE EVALUATION
Anesthesia Evaluation     Patient summary reviewed and Nursing notes reviewed   NPO Solid Status: > 6 hours  NPO Liquid Status: > 2 hours           Airway   Mallampati: II  TM distance: >3 FB  Neck ROM: full  No difficulty expected  Dental - normal exam     Pulmonary - normal exam   Cardiovascular     Rhythm: regular  Rate: normal        Neuro/Psych  GI/Hepatic/Renal/Endo      Musculoskeletal     Abdominal    Substance History      OB/GYN          Other - negative ROS                       Anesthesia Plan    ASA 3     MAC     intravenous induction   Anesthetic plan and risks discussed with patient.    Plan discussed with CRNA.

## 2018-04-02 NOTE — H&P
Lisa Chambers DO,[facility]  Gastroenterology  Hepatology  Endoscopy  Board Certified in Internal Medicine and gastroenterology  [address], [address]  [place]. [address]  - [phone]   F - [phone]     GASTROENTEROLOGY HISTORY AND PHYSICAL  NOTE   LISA CHAMBERS DO.         SUBJECTIVE:   4/2/2018    Name: Vielka Pratt  DOD: 1970        Chief Complaint:       Subjective : Acid reflux.  Intermittent changes in bowel habits.  Personal history of colon polyps    Patient is 48 y.o. female presents with desire for elective EGD and colonoscopy.      ROS/HISTORY/ CURRENT MEDICATIONS/OBJECTIVE/VS/PE:   Review of Systems:   Review of Systems    History:     Past Medical History:   Diagnosis Date   • Abnormal granulation tissue    • Acute bacterial pharyngitis    • Acute tonsillitis, unspecified    • Allergic asthma     IgE-mediated allergic asthma      • Allergic rhinitis due to pollen    • Anxiety    • Asthma    • Back pain    • Backache    • Breast lump    • Cellulitis of trunk     Postoperative   • Chronic pain    • Corns and callus    • Cough    • Depressive disorder    • Diarrhea    • Dysphagia    • Dysuria    • Endometriosis    • Family history of malignant neoplasm of gastrointestinal tract    • GERD (gastroesophageal reflux disease)    • Hirsutism    • Hypertension    • Irritable bowel syndrome    • Leukorrhea, not specified as infective    • Mastitis    • Migraine    • Sebaceous cyst    • Shortness of breath    • Tick bite    • Tobacco abuse disorder 7/6/2017   • Tobacco user    • Upper respiratory infection      Past Surgical History:   Procedure Laterality Date   • DIAGNOSTIC LAPAROSCOPY  04/08/2008    Laparosc diagnostic (2)      • ENDOSCOPY  03/26/2013    Colon endoscopy 26134 (2)      • ENDOSCOPY W/ PEG TUBE PLACEMENT  03/26/2013    EGD w/ tube 07409 (2)      • EXCISION LESION  01/30/2013    EXC TR-EXT Benign+Irina 0.6-1 CM 51032 (1)      • HAND TENOLYSIS   07/23/1992    Hand/finger surgery (1)      • HEAD & NECK SKIN LESION EXCISIONAL BIOPSY  01/31/1994    Biopsy of Skin 87459 (2)      • INCISION AND DRAINAGE BREAST ABSCESS  12/03/2012    Anesth, surgery of breast (1)      • INJECTION OF MEDICATION  09/12/2011    B12 (1)      • INJECTION OF MEDICATION  04/28/2015    Celestone (betamethasone) (1)     • INJECTION OF MEDICATION  08/17/2015    Depo Medrol (Methylprednisone) (6)      • INJECTION OF MEDICATION  06/08/2012    Kenalog (3)      • INJECTION OF MEDICATION  06/08/2012    Toradol (1)      • NASOLACRIMAL DUCT PROBING  08/16/1973    Probe nasolacrimal duct (1)      • NEPHROSTOMY TRACT DILATATION W/ LITHOTRIPSY     • PAP SMEAR  03/20/2008    PAP SMEAR (1)      • TOTAL ABDOMINAL HYSTERECTOMY WITH SALPINGO OOPHORECTOMY  04/09/2012    ERLIN/BSO (1)        Family History   Problem Relation Age of Onset   • Cancer Other      other   • Colon cancer Other      Colorectal Cancer   • Diabetes Other    • Heart disease Other    • Hypertension Other    • Lung cancer Other      Social History   Substance Use Topics   • Smoking status: Current Every Day Smoker     Packs/day: 1.00   • Smokeless tobacco: Never Used   • Alcohol use Yes      Comment: Pt states she drinks less than once a month     Prescriptions Prior to Admission   Medication Sig Dispense Refill Last Dose   • albuterol (VENTOLIN HFA) 108 (90 Base) MCG/ACT inhaler 2 puffs every 4 hours as needed for breathing 1 inhaler 5 Past Month at Unknown time   • Biotin 1000 MCG tablet Take 1,000 mcg by mouth.   4/1/2018 at Unknown time   • BREO ELLIPTA 200-25 MCG/INH aerosol powder  Inhale 1 spray Daily. 1 each 5 4/1/2018 at Unknown time   • carvedilol (COREG) 12.5 MG tablet Take 12.5 mg by mouth 2 (Two) Times a Day With Meals.   4/2/2018 at Unknown time   • celecoxib (CeleBREX) 200 MG capsule Take 200 mg by mouth 2 (Two) Times a Day.   3/30/2018   • dicyclomine (BENTYL) 20 MG tablet TAKE 1 TABLET BY MOUTH EVERY 6 HOURS.   4/1/2018  at Unknown time   • escitalopram (LEXAPRO) 20 MG tablet Take 20 mg by mouth Daily.   4/1/2018 at Unknown time   • gabapentin (NEURONTIN) 800 MG tablet Take 800 mg by mouth 3 (Three) Times a Day.   4/1/2018 at Unknown time   • HYDROcodone-acetaminophen (NORCO) 7.5-325 MG per tablet Take 1 tablet by mouth Every 6 (Six) Hours.   4/2/2018 at Unknown time   • ketoconazole (NIZORAL) 2 % shampoo Apply  topically.   4/1/2018 at Unknown time   • metFORMIN (GLUCOPHAGE) 500 MG tablet Take 500 mg by mouth 2 (Two) Times a Day With Meals.   4/1/2018 at Unknown time   • minocycline (MINOCIN,DYNACIN) 100 MG capsule Take 100 mg by mouth 2 (Two) Times a Day.   4/1/2018 at Unknown time   • montelukast (SINGULAIR) 10 MG tablet Take 10 mg by mouth Every Night.   4/1/2018 at Unknown time   • ondansetron (ZOFRAN) 4 MG tablet Take 1 tablet by mouth Every 6 (Six) Hours As Needed for Nausea or Vomiting. 10 tablet 0 4/1/2018 at Unknown time   • oxybutynin (DITROPAN) 5 MG tablet Take 5 mg by mouth 3 (Three) Times a Day.   4/1/2018 at Unknown time   • pantoprazole (PROTONIX) 20 MG EC tablet Take 20 mg by mouth Daily. Pt unsure of dosage   4/1/2018 at Unknown time   • PROAIR  (90 BASE) MCG/ACT inhaler    4/1/2018 at Unknown time   • SUMAtriptan (IMITREX) 50 MG tablet Take 50 mg by mouth.   4/1/2018 at Unknown time   • TROKENDI  MG capsule sustained-release 24 hr Take 1 tablet by mouth Daily.   4/1/2018 at Unknown time     Allergies:  Contrast dye; Iodinated diagnostic agents; Iron; Naproxen; Penicillins; Sulfur; and Tetracyclines & related    I have reviewed the patients medical history, surgical history and family history in the available medical record system.     Current Medications:     Current Facility-Administered Medications   Medication Dose Route Frequency Provider Last Rate Last Dose   • dextrose 5 % and sodium chloride 0.45 % infusion  30 mL/hr Intravenous Continuous Van Romano DO 30 mL/hr at 04/02/18 1458 30 mL/hr  at 04/02/18 1458       Objective     Physical Exam:   Heart Rate:  [63] 63  Resp:  [20] 20  BP: (138)/(85) 138/85    Physical Exam:  General Appearance:    Alert, cooperative, in no acute distress   Head:    Normocephalic, without obvious abnormality, atraumatic   Eyes:            Lids and lashes normal, conjunctivae and sclerae normal, no   icterus, no pallor, corneas clear, PERRLA   Ears:    Ears appear intact with no abnormalities noted   Throat:   No oral lesions, no thrush, oral mucosa moist   Neck:   No adenopathy, supple, trachea midline, no thyromegaly, no     carotid bruit, no JVD   Back:     No kyphosis present, no scoliosis present, no skin lesions,       erythema or scars, no tenderness to percussion or                   palpation,   range of motion normal   Lungs:     Clear to auscultation,respirations regular, even and                   unlabored    Heart:    Regular rhythm and normal rate, normal S1 and S2, no            murmur, no gallop, no rub, no click   Breast Exam:    Deferred   Abdomen:     Normal bowel sounds, no masses, no organomegaly, soft        non-tender, non-distended, no guarding, no rebound                 tenderness   Genitalia:    Deferred   Extremities:   Moves all extremities well, no edema, no cyanosis, no              redness   Pulses:   Pulses palpable and equal bilaterally   Skin:   No bleeding, bruising or rash   Lymph nodes:   No palpable adenopathy   Neurologic:   Cranial nerves 2 - 12 grossly intact, sensation intact, DTR        present and equal bilaterally      Results Review:     Lab Results   Component Value Date    WBC 13.72 (H) 06/13/2017    WBC 9.71 06/12/2017    WBC 11.22 (H) 06/11/2017    HGB 16.9 (H) 06/13/2017    HGB 15.4 06/12/2017    HGB 15.7 (H) 06/11/2017    HCT 48.8 (H) 06/13/2017    HCT 46.7 (H) 06/12/2017    HCT 45.8 (H) 06/11/2017     06/13/2017     06/12/2017     06/11/2017             No results found for: LIPASE  Lab Results    Component Value Date    INR 1.0 10/03/2015    INR 1.0 08/14/2015          Radiology Review:  Imaging Results (last 72 hours)     ** No results found for the last 72 hours. **           I reviewed the patient's new clinical results.  I reviewed the patient's new imaging results and agree with the interpretation.     ASSESSMENT/PLAN:   ASSESSMENT:   1.  Dyspepsia  2.  Acid reflux  3.  Changes in bowel habits  4.  Personal history of colon polyps    PLAN:   1.  Esophagogastroduodenoscopy with biopsies  2.  Colonoscopy    Risk and benefits associated with the procedure are reviewed with the patient.  The patient wishes to proceed      Van Romano DO  04/02/18  3:08 PM

## 2018-04-02 NOTE — ANESTHESIA POSTPROCEDURE EVALUATION
Patient: Vielka Pratt    Procedure Summary     Date:  04/02/18 Room / Location:  [facility] ENDOSCOPY 2 / [facility] ENDOSCOPY    Anesthesia Start:  1516 Anesthesia Stop:  1538    Procedures:       ESOPHAGOGASTRODUODENOSCOPY (N/A Esophagus)      COLONOSCOPY (N/A ) Diagnosis:       Irritable bowel syndrome with constipation      Gastric reflux      (Irritable bowel syndrome with constipation [K58.1])      (Gastric reflux [K21.9])    Surgeon:  Van Romano DO Provider:  Jose Simpson CRNA    Anesthesia Type:  MAC ASA Status:  3          Anesthesia Type: MAC  Last vitals  BP   138/85 (04/02/18 1312)   Temp       Pulse   63 (04/02/18 1312)   Resp   20 (04/02/18 1312)     SpO2   96 % (04/02/18 1312)     Post Anesthesia Care and Evaluation    Patient location during evaluation: bedside  Patient participation: complete - patient participated  Level of consciousness: awake and alert  Pain score: 0  Pain management: adequate  Airway patency: patent  Anesthetic complications: No anesthetic complications  PONV Status: none  Cardiovascular status: acceptable  Respiratory status: acceptable  Hydration status: acceptable

## 2018-04-04 LAB
LAB AP CASE REPORT: NORMAL
Lab: NORMAL
PATH REPORT.FINAL DX SPEC: NORMAL
PATH REPORT.GROSS SPEC: NORMAL

## 2018-08-16 ENCOUNTER — OFFICE VISIT (OUTPATIENT)
Dept: CARDIOLOGY | Facility: CLINIC | Age: 48
End: 2018-08-16

## 2018-08-16 VITALS
HEART RATE: 69 BPM | SYSTOLIC BLOOD PRESSURE: 126 MMHG | HEIGHT: 63 IN | DIASTOLIC BLOOD PRESSURE: 75 MMHG | BODY MASS INDEX: 24.1 KG/M2 | WEIGHT: 136 LBS | OXYGEN SATURATION: 99 %

## 2018-08-16 DIAGNOSIS — I73.9 PERIPHERAL ARTERIAL DISEASE (HCC): ICD-10-CM

## 2018-08-16 DIAGNOSIS — I20.9 ANGINA PECTORIS (HCC): ICD-10-CM

## 2018-08-16 DIAGNOSIS — I10 ESSENTIAL HYPERTENSION: Primary | ICD-10-CM

## 2018-08-16 DIAGNOSIS — Z72.0 NICOTINE ABUSE: ICD-10-CM

## 2018-08-16 PROBLEM — E11.9 TYPE 2 DIABETES MELLITUS WITHOUT COMPLICATION, WITHOUT LONG-TERM CURRENT USE OF INSULIN: Status: ACTIVE | Noted: 2018-08-16

## 2018-08-16 PROCEDURE — 99204 OFFICE O/P NEW MOD 45 MIN: CPT | Performed by: INTERNAL MEDICINE

## 2018-08-16 PROCEDURE — 93000 ELECTROCARDIOGRAM COMPLETE: CPT | Performed by: INTERNAL MEDICINE

## 2018-08-16 RX ORDER — NITROGLYCERIN 0.4 MG/1
0.4 TABLET SUBLINGUAL
COMMUNITY
End: 2019-01-29

## 2018-08-16 NOTE — PROGRESS NOTES
[facility] Cardiology  OFFICE NOTE    Vielka Pratt  48 y.o. female    2018  1. Essential hypertension    2. Angina pectoris (CMS/HCC)    3. Nicotine abuse    4. Peripheral arterial disease (CMS/HCC)        Chief complaint -left-sided chest pain radiating to arms    History of present Illness- 48-year-old lady who has history of back problems on multiple pain medicines antidepressants and muscle relaxers and is a smoker.  She had an echo and a carotid duplex in 2017 which were unremarkable.  She has frequent headaches and takes Imitrex.  She never had any cardiac problems.  Her father  of possible MI and he had lung CA from smoking.  Denies any GI symptoms.  Complaints of claudication pain left leg greater than right leg and has been a smoker for long-time.              Allergies   Allergen Reactions   • Contrast Dye Hives   • Iodinated Diagnostic Agents Hives and Rash   • Iron Nausea And Vomiting   • Naproxen Rash   • Penicillins Rash   • Sulfur Rash   • Tetracyclines & Related Rash         Past Medical History:   Diagnosis Date   • Abnormal granulation tissue    • Acute bacterial pharyngitis    • Acute tonsillitis, unspecified    • Allergic asthma     IgE-mediated allergic asthma      • Allergic rhinitis due to pollen    • Anxiety    • Asthma    • Back pain    • Backache    • Breast lump    • Cellulitis of trunk     Postoperative   • Chronic pain    • Corns and callus    • Cough    • Depressive disorder    • Diarrhea    • Dysphagia    • Dysuria    • Endometriosis    • Family history of malignant neoplasm of gastrointestinal tract    • GERD (gastroesophageal reflux disease)    • Hirsutism    • Hypertension    • Irritable bowel syndrome    • Leukorrhea, not specified as infective    • Mastitis    • Migraine    • Sebaceous cyst    • Shortness of breath    • Tick bite    • Tobacco abuse disorder 2017   • Tobacco user    • Upper respiratory infection          Past  Surgical History:   Procedure Laterality Date   • COLONOSCOPY N/A 4/2/2018    Procedure: COLONOSCOPY;  Surgeon: Van Romano DO;  Location: [facility] ENDOSCOPY;  Service: Gastroenterology   • DIAGNOSTIC LAPAROSCOPY  04/08/2008    Laparosc diagnostic (2)      • ENDOSCOPY  03/26/2013    Colon endoscopy 85973 (2)      • ENDOSCOPY N/A 4/2/2018    Procedure: ESOPHAGOGASTRODUODENOSCOPY;  Surgeon: Van Romano DO;  Location: [facility] ENDOSCOPY;  Service: Gastroenterology   • ENDOSCOPY W/ PEG TUBE PLACEMENT  03/26/2013    EGD w/ tube 93475 (2)      • EXCISION LESION  01/30/2013    EXC TR-EXT Benign+Irina 0.6-1 CM 23352 (1)      • HAND TENOLYSIS  07/23/1992    Hand/finger surgery (1)      • HEAD & NECK SKIN LESION EXCISIONAL BIOPSY  01/31/1994    Biopsy of Skin 12797 (2)      • INCISION AND DRAINAGE BREAST ABSCESS  12/03/2012    Anesth, surgery of breast (1)      • INJECTION OF MEDICATION  09/12/2011    B12 (1)      • INJECTION OF MEDICATION  04/28/2015    Celestone (betamethasone) (1)     • INJECTION OF MEDICATION  08/17/2015    Depo Medrol (Methylprednisone) (6)      • INJECTION OF MEDICATION  06/08/2012    Kenalog (3)      • INJECTION OF MEDICATION  06/08/2012    Toradol (1)      • NASOLACRIMAL DUCT PROBING  08/16/1973    Probe nasolacrimal duct (1)      • NEPHROSTOMY TRACT DILATATION W/ LITHOTRIPSY     • PAP SMEAR  03/20/2008    PAP SMEAR (1)      • TOTAL ABDOMINAL HYSTERECTOMY WITH SALPINGO OOPHORECTOMY  04/09/2012    ERLIN/BSO (1)            Family History   Problem Relation Age of Onset   • Cancer Other         other   • Colon cancer Other         Colorectal Cancer   • Diabetes Other    • Heart disease Other    • Hypertension Other    • Lung cancer Other    • Hypertension Mother    • Cancer Sister    • Heart attack Sister    • Heart disease Sister    • Diabetes Brother          Social History     Social History   • Marital status:      Spouse name: N/A   • Number of children: N/A   • Years of education: N/A      Occupational History   • Not on file.     Social History Main Topics   • Smoking status: Current Every Day Smoker     Packs/day: 1.00   • Smokeless tobacco: Never Used   • Alcohol use Yes      Comment: Pt states she drinks less than once a month   • Drug use: No   • Sexual activity: Not on file     Other Topics Concern   • Not on file     Social History Narrative   • No narrative on file         Current Outpatient Prescriptions   Medication Sig Dispense Refill   • albuterol (VENTOLIN HFA) 108 (90 Base) MCG/ACT inhaler 2 puffs every 4 hours as needed for breathing 1 inhaler 5   • Biotin 1000 MCG tablet Take 1,000 mcg by mouth.     • BREO ELLIPTA 200-25 MCG/INH aerosol powder  Inhale 1 spray Daily. 1 each 5   • carvedilol (COREG) 12.5 MG tablet Take 12.5 mg by mouth 2 (Two) Times a Day With Meals.     • celecoxib (CeleBREX) 200 MG capsule Take 200 mg by mouth 2 (Two) Times a Day.     • dicyclomine (BENTYL) 20 MG tablet TAKE 1 TABLET BY MOUTH EVERY 6 HOURS.     • escitalopram (LEXAPRO) 20 MG tablet Take 20 mg by mouth Daily.     • gabapentin (NEURONTIN) 800 MG tablet Take 800 mg by mouth 3 (Three) Times a Day.     • HYDROcodone-acetaminophen (NORCO) 7.5-325 MG per tablet Take 1 tablet by mouth Every 6 (Six) Hours.     • ketoconazole (NIZORAL) 2 % shampoo Apply  topically.     • metFORMIN (GLUCOPHAGE) 500 MG tablet Take 500 mg by mouth 2 (Two) Times a Day With Meals.     • montelukast (SINGULAIR) 10 MG tablet Take 10 mg by mouth Every Night.     • nitroglycerin (NITROSTAT) 0.4 MG SL tablet Place 0.4 mg under the tongue Every 5 (Five) Minutes As Needed for Chest Pain. Take no more than 3 doses in 15 minutes.     • ondansetron (ZOFRAN) 4 MG tablet Take 1 tablet by mouth Every 6 (Six) Hours As Needed for Nausea or Vomiting. 10 tablet 0   • oxybutynin (DITROPAN) 5 MG tablet Take 5 mg by mouth 3 (Three) Times a Day.     • pantoprazole (PROTONIX) 20 MG EC tablet Take 20 mg by mouth Daily. Pt unsure of dosage     • PROAIR  " (90 BASE) MCG/ACT inhaler      • SUMAtriptan (IMITREX) 50 MG tablet Take 50 mg by mouth.     • TROKENDI  MG capsule sustained-release 24 hr Take 1 tablet by mouth Daily.       No current facility-administered medications for this visit.          Review of Systems     Constitution: fatigue, fever or chills    HENT: Denies any headache, hearing impairment,     Eyes: Denies any blurring of vision, or photophobia     Cardivascular - As per history of present illness     Respiratory system- redness of breath NYHA class II     Endocrine:  No history of hyperlipidemia, diabetes,  or Hypothyroidism                       Musculoskeletal:  history of arthritis with musculoskeletal problems of the back    Gastrointestinal: No nausea, vomiting, or melena    Genitourinary: No dysuria or hematuria    Neurological:   No history of seizure disorder, stroke, memory problems    Psychiatric/Behavioral:        No history of depression    Hematological- no history of easy bruising or any bleeding diathesis            OBJECTIVE    /75   Pulse 69   Ht 160 cm (62.99\")   Wt 61.7 kg (136 lb)   SpO2 99%   BMI 24.10 kg/m²       Physical Exam     Constitutional: is oriented to person, place, and time.     Skin-warm and dry    Well developed and nourished in no acute distress      Head: Normocephalic and atraumatic.     Eyes: Pupils are equal    Neck: Neck supple. No bruit in the carotids    Cardiovascular: [place] in the fifth intercostal space   Regular rate, and  Rhythm,    S1 greater than S2, no S3 or S4, no gallop     Pulmonary/Chest:   Air  Entry is equal on both sides  No wheezing or crackles,      Abdominal: Soft.  No hepatosplenomegaly, bowel sounds are present    Musculoskeletal: No kyphoscoliosis, no significant thickening of the joints    Neurological: is alert and oriented to person, place, and time.    cranial nerve are intact .   No motor or sensory deficit    Extremities-no edema, no radial femoral " delay      Psychiatric: He has a normal mood and affect.                  His behavior is normal.             ECG 12 Lead  Date/Time: 8/16/2018 10:14 AM  Performed by: KATHARINA ESCOBAR  Authorized by: KATHARINA ESCOBAR   Comparison: not compared with previous ECG   Rhythm: sinus rhythm  Clinical impression: non-specific ECG              Lab Results   Component Value Date    WBC 13.72 (H) 06/13/2017    HGB 16.9 (H) 06/13/2017    HCT 48.8 (H) 06/13/2017    MCV 92.2 06/13/2017     06/13/2017     Lab Results   Component Value Date    GLUCOSE 92 06/13/2017    BUN 11 06/13/2017    CREATININE 0.67 06/13/2017    EGFRIFNONA 95 06/13/2017    BCR 16.4 06/13/2017    CO2 21.0 (L) 06/13/2017    CALCIUM 9.1 06/13/2017    ALBUMIN 4.10 06/11/2017    AST 15 06/11/2017    ALT 32 06/11/2017     Lab Results   Component Value Date    CHOL 201 (H) 06/12/2017     Lab Results   Component Value Date    TRIG 262 (H) 06/12/2017     Lab Results   Component Value Date    HDL 37 (L) 06/12/2017     No components found for: LDLCALC  Lab Results   Component Value Date     (H) 06/12/2017     No results found for: HDLLDLRATIO  No components found for: CHOLHDL  No results found for: HGBA1C  Lab Results   Component Value Date    TSH 0.650 07/06/2017                  A/P    Chest pain with risk factors of tobacco use, hypertension and premature CAD in the father, schedule for exercise treadmill stress test as her EKG is normal.  talk her about quitting smoking.  Is already on an aspirin every day    Tobacco abuse needs his factor modification.    Hypertension currently on Coreg.    Chronic headaches on Imitrex.    Chronic back pain on multiple medicines including gabapentin, hydrocodone, Celebrex.    Claudication pain in left leg greater than right leg will do a WALKER.    Follow-up after these tests            This document has been electronically signed by Katharina Escobar MD on August 16, 2018 10:13 AM       EMR  Dragon/Transcription disclaimer:   Some of this note may be an electronic transcription/translation of spoken language to printed text. The electronic translation of spoken language may permit erroneous, or at times, nonsensical words or phrases to be inadvertently transcribed; Although I have reviewed the note for such errors, some may still exist.

## 2018-09-04 LAB
BH CV STRESS BP STAGE 1: NORMAL
BH CV STRESS BP STAGE 2: NORMAL
BH CV STRESS BP STAGE 3: NORMAL
BH CV STRESS BP STAGE 4: NORMAL
BH CV STRESS DURATION MIN STAGE 1: 3
BH CV STRESS DURATION MIN STAGE 2: 3
BH CV STRESS DURATION MIN STAGE 3: 3
BH CV STRESS DURATION MIN STAGE 4: 3
BH CV STRESS DURATION SEC STAGE 1: 0
BH CV STRESS DURATION SEC STAGE 2: 0
BH CV STRESS DURATION SEC STAGE 3: 0
BH CV STRESS DURATION SEC STAGE 4: 0
BH CV STRESS GRADE STAGE 1: 10
BH CV STRESS GRADE STAGE 2: 12
BH CV STRESS GRADE STAGE 3: 14
BH CV STRESS GRADE STAGE 4: 16
BH CV STRESS HR STAGE 1: 74
BH CV STRESS HR STAGE 2: 80
BH CV STRESS HR STAGE 3: 99
BH CV STRESS HR STAGE 4: 109
BH CV STRESS METS STAGE 1: 5
BH CV STRESS METS STAGE 2: 7.5
BH CV STRESS METS STAGE 3: 10
BH CV STRESS METS STAGE 4: 13.5
BH CV STRESS PROTOCOL 1: NORMAL
BH CV STRESS RECOVERY BP: NORMAL MMHG
BH CV STRESS RECOVERY HR: 70 BPM
BH CV STRESS SPEED STAGE 1: 1.7
BH CV STRESS SPEED STAGE 2: 2.5
BH CV STRESS SPEED STAGE 3: 3.4
BH CV STRESS SPEED STAGE 4: 4.2
BH CV STRESS STAGE 1: 1
BH CV STRESS STAGE 2: 2
BH CV STRESS STAGE 3: 3
BH CV STRESS STAGE 4: 4
MAXIMAL PREDICTED HEART RATE: 174 BPM
PERCENT MAX PREDICTED HR: 64.37 %
STRESS BASELINE BP: NORMAL MMHG
STRESS BASELINE HR: 65 BPM
STRESS PERCENT HR: 76 %
STRESS POST ESTIMATED WORKLOAD: 12.5 METS
STRESS POST EXERCISE DUR MIN: 9 MIN
STRESS POST EXERCISE DUR SEC: 45 SEC
STRESS POST PEAK BP: NORMAL MMHG
STRESS POST PEAK HR: 112 BPM
STRESS TARGET HR: 148 BPM

## 2018-09-25 ENCOUNTER — OFFICE VISIT (OUTPATIENT)
Dept: CARDIOLOGY | Facility: CLINIC | Age: 48
End: 2018-09-25

## 2018-09-25 VITALS
DIASTOLIC BLOOD PRESSURE: 65 MMHG | SYSTOLIC BLOOD PRESSURE: 121 MMHG | BODY MASS INDEX: 23.57 KG/M2 | HEIGHT: 63 IN | WEIGHT: 133 LBS | HEART RATE: 69 BPM

## 2018-09-25 DIAGNOSIS — R07.89 OTHER CHEST PAIN: ICD-10-CM

## 2018-09-25 DIAGNOSIS — I10 ESSENTIAL HYPERTENSION: Primary | ICD-10-CM

## 2018-09-25 DIAGNOSIS — Z72.0 NICOTINE ABUSE: ICD-10-CM

## 2018-09-25 PROCEDURE — 99213 OFFICE O/P EST LOW 20 MIN: CPT | Performed by: INTERNAL MEDICINE

## 2018-09-25 NOTE — PROGRESS NOTES
[facility] Cardiology  OFFICE NOTE    Vielka Pratt  48 y.o. female    2018  1. Essential hypertension    2. Other chest pain    3. Nicotine abuse        Chief complaint -follow-up chest pain    History of present Illness- 48-year-old lady who has history of back problems on multiple pain medicines antidepressants and muscle relaxers and is a smoker.  She had an echo and a carotid duplex in 2017 which were unremarkable.  She has frequent headaches and takes Imitrex.  She never had any cardiac problems.  Her father  of possible MI and he had lung CA from smoking.  Denies any GI symptoms.  Complaints of claudication pain left leg greater than right leg and has been a smoker for long-time.              Allergies   Allergen Reactions   • Contrast Dye Hives   • Iodinated Diagnostic Agents Hives and Rash   • Iron Nausea And Vomiting   • Naproxen Rash   • Penicillins Rash   • Sulfur Rash   • Tetracyclines & Related Rash         Past Medical History:   Diagnosis Date   • Abnormal granulation tissue    • Acute bacterial pharyngitis    • Acute tonsillitis, unspecified    • Allergic asthma     IgE-mediated allergic asthma      • Allergic rhinitis due to pollen    • Anxiety    • Asthma    • Back pain    • Backache    • Breast lump    • Cellulitis of trunk     Postoperative   • Chronic pain    • Corns and callus    • Cough    • Depressive disorder    • Diarrhea    • Dysphagia    • Dysuria    • Endometriosis    • Family history of malignant neoplasm of gastrointestinal tract    • GERD (gastroesophageal reflux disease)    • Hirsutism    • Hypertension    • Irritable bowel syndrome    • Leukorrhea, not specified as infective    • Mastitis    • Migraine    • Sebaceous cyst    • Shortness of breath    • Tick bite    • Tobacco abuse disorder 2017   • Tobacco user    • Upper respiratory infection          Past Surgical History:   Procedure Laterality Date   • COLONOSCOPY N/A  4/2/2018    Procedure: COLONOSCOPY;  Surgeon: Van Romano DO;  Location: [facility] ENDOSCOPY;  Service: Gastroenterology   • DIAGNOSTIC LAPAROSCOPY  04/08/2008    Laparosc diagnostic (2)      • ENDOSCOPY  03/26/2013    Colon endoscopy 79685 (2)      • ENDOSCOPY N/A 4/2/2018    Procedure: ESOPHAGOGASTRODUODENOSCOPY;  Surgeon: Van Romano DO;  Location: [facility] ENDOSCOPY;  Service: Gastroenterology   • ENDOSCOPY W/ PEG TUBE PLACEMENT  03/26/2013    EGD w/ tube 84244 (2)      • EXCISION LESION  01/30/2013    EXC TR-EXT Benign+Irina 0.6-1 CM 85384 (1)      • HAND TENOLYSIS  07/23/1992    Hand/finger surgery (1)      • HEAD & NECK SKIN LESION EXCISIONAL BIOPSY  01/31/1994    Biopsy of Skin 82891 (2)      • INCISION AND DRAINAGE BREAST ABSCESS  12/03/2012    Anesth, surgery of breast (1)      • INJECTION OF MEDICATION  09/12/2011    B12 (1)      • INJECTION OF MEDICATION  04/28/2015    Celestone (betamethasone) (1)     • INJECTION OF MEDICATION  08/17/2015    Depo Medrol (Methylprednisone) (6)      • INJECTION OF MEDICATION  06/08/2012    Kenalog (3)      • INJECTION OF MEDICATION  06/08/2012    Toradol (1)      • NASOLACRIMAL DUCT PROBING  08/16/1973    Probe nasolacrimal duct (1)      • NEPHROSTOMY TRACT DILATATION W/ LITHOTRIPSY     • PAP SMEAR  03/20/2008    PAP SMEAR (1)      • TOTAL ABDOMINAL HYSTERECTOMY WITH SALPINGO OOPHORECTOMY  04/09/2012    ERLIN/BSO (1)            Family History   Problem Relation Age of Onset   • Cancer Other         other   • Colon cancer Other         Colorectal Cancer   • Diabetes Other    • Heart disease Other    • Hypertension Other    • Lung cancer Other    • Hypertension Mother    • Cancer Sister    • Heart attack Sister    • Heart disease Sister    • Diabetes Brother          Social History     Social History   • Marital status:      Spouse name: N/A   • Number of children: N/A   • Years of education: N/A     Occupational History   • Not on file.     Social History Main  Topics   • Smoking status: Current Every Day Smoker     Packs/day: 1.00   • Smokeless tobacco: Never Used   • Alcohol use Yes      Comment: Pt states she drinks less than once a month   • Drug use: No   • Sexual activity: Not on file     Other Topics Concern   • Not on file     Social History Narrative   • No narrative on file         Current Outpatient Prescriptions   Medication Sig Dispense Refill   • albuterol (VENTOLIN HFA) 108 (90 Base) MCG/ACT inhaler 2 puffs every 4 hours as needed for breathing 1 inhaler 5   • Biotin 1000 MCG tablet Take 1,000 mcg by mouth.     • BREO ELLIPTA 200-25 MCG/INH aerosol powder  Inhale 1 spray Daily. 1 each 5   • carvedilol (COREG) 12.5 MG tablet Take 12.5 mg by mouth 2 (Two) Times a Day With Meals.     • celecoxib (CeleBREX) 200 MG capsule Take 200 mg by mouth 2 (Two) Times a Day.     • dicyclomine (BENTYL) 20 MG tablet TAKE 1 TABLET BY MOUTH EVERY 6 HOURS.     • escitalopram (LEXAPRO) 20 MG tablet Take 20 mg by mouth Daily.     • gabapentin (NEURONTIN) 800 MG tablet Take 800 mg by mouth 3 (Three) Times a Day.     • HYDROcodone-acetaminophen (NORCO) 7.5-325 MG per tablet Take 1 tablet by mouth Every 6 (Six) Hours.     • ketoconazole (NIZORAL) 2 % shampoo Apply  topically.     • metFORMIN (GLUCOPHAGE) 500 MG tablet Take 500 mg by mouth 2 (Two) Times a Day With Meals.     • montelukast (SINGULAIR) 10 MG tablet Take 10 mg by mouth Every Night.     • nitroglycerin (NITROSTAT) 0.4 MG SL tablet Place 0.4 mg under the tongue Every 5 (Five) Minutes As Needed for Chest Pain. Take no more than 3 doses in 15 minutes.     • ondansetron (ZOFRAN) 4 MG tablet Take 1 tablet by mouth Every 6 (Six) Hours As Needed for Nausea or Vomiting. 10 tablet 0   • oxybutynin (DITROPAN) 5 MG tablet Take 5 mg by mouth 3 (Three) Times a Day.     • pantoprazole (PROTONIX) 20 MG EC tablet Take 20 mg by mouth Daily. Pt unsure of dosage     • PROAIR  (90 BASE) MCG/ACT inhaler      • SUMAtriptan (IMITREX) 50  "MG tablet Take 50 mg by mouth.     • TROKENDI  MG capsule sustained-release 24 hr Take 1 tablet by mouth Daily.       No current facility-administered medications for this visit.          Review of Systems     Constitution: fatigue, fever or chills    HENT: Denies any headache, hearing impairment,     Eyes: Denies any blurring of vision, or photophobia     Cardivascular - As per history of present illness     Respiratory system- redness of breath NYHA class II     Endocrine:  No history of hyperlipidemia, diabetes,  or Hypothyroidism                       Musculoskeletal:  history of arthritis with musculoskeletal problems of the back    Gastrointestinal: No nausea, vomiting, or melena    Genitourinary: No dysuria or hematuria    Neurological:   No history of seizure disorder, stroke, memory problems    Psychiatric/Behavioral:        No history of depression    Hematological- no history of easy bruising or any bleeding diathesis            OBJECTIVE    /65   Pulse 69   Ht 160 cm (62.99\")   Wt 60.3 kg (133 lb)   BMI 23.57 kg/m²       Physical Exam     Constitutional: is oriented to person, place, and time.     Skin-warm and dry    Well developed and nourished in no acute distress      Head: Normocephalic and atraumatic.     Eyes: Pupils are equal    Neck: Neck supple. No bruit in the carotids    Cardiovascular: [place] in the fifth intercostal space   Regular rate, and  Rhythm,    S1 greater than S2, no S3 or S4, no gallop     Pulmonary/Chest:   Air  Entry is equal on both sides  No wheezing or crackles,      Abdominal: Soft.  No hepatosplenomegaly, bowel sounds are present    Musculoskeletal: No kyphoscoliosis, no significant thickening of the joints    Neurological: is alert and oriented to person, place, and time.    cranial nerve are intact .   No motor or sensory deficit    Extremities-no edema, no radial femoral delay      Psychiatric: He has a normal mood and affect.                  His behavior " is normal.           Procedures      Lab Results   Component Value Date    WBC 13.72 (H) 06/13/2017    HGB 16.9 (H) 06/13/2017    HCT 48.8 (H) 06/13/2017    MCV 92.2 06/13/2017     06/13/2017     Lab Results   Component Value Date    GLUCOSE 92 06/13/2017    BUN 11 06/13/2017    CREATININE 0.67 06/13/2017    EGFRIFNONA 95 06/13/2017    BCR 16.4 06/13/2017    CO2 21.0 (L) 06/13/2017    CALCIUM 9.1 06/13/2017    ALBUMIN 4.10 06/11/2017    AST 15 06/11/2017    ALT 32 06/11/2017     Lab Results   Component Value Date    CHOL 201 (H) 06/12/2017     Lab Results   Component Value Date    TRIG 262 (H) 06/12/2017     Lab Results   Component Value Date    HDL 37 (L) 06/12/2017     No components found for: LDLCALC  Lab Results   Component Value Date     (H) 06/12/2017     No results found for: HDLLDLRATIO  No components found for: CHOLHDL  No results found for: HGBA1C  Lab Results   Component Value Date    TSH 0.650 07/06/2017                  A/P    Chest pain with risk factors of tobacco use, hypertension and premature CAD in the father, he exercised 10 minutes on Preston protocol and it was unremarkable, WALKER also was unremarkable she had an echo about a year ago which was unremarkable    Tobacco abuse needs his factor modification.    Hypertension currently on Coreg blood pressure is well controlled    Chronic headaches on Imitrex.    Chronic back pain on multiple medicines including gabapentin, hydrocodone, Celebrex.    Follow-up in 1 year or earlier if any problems            This document has been electronically signed by Kaushik Concepcion MD on September 25, 2018 3:20 PM       EMR Dragon/Transcription disclaimer:   Some of this note may be an electronic transcription/translation of spoken language to printed text. The electronic translation of spoken language may permit erroneous, or at times, nonsensical words or phrases to be inadvertently transcribed; Although I have reviewed the note for such errors,  some may still exist.

## 2018-11-01 ENCOUNTER — OFFICE VISIT (OUTPATIENT)
Dept: ENDOCRINOLOGY | Facility: CLINIC | Age: 48
End: 2018-11-01

## 2018-11-01 VITALS
DIASTOLIC BLOOD PRESSURE: 74 MMHG | OXYGEN SATURATION: 99 % | BODY MASS INDEX: 23.32 KG/M2 | HEIGHT: 63 IN | SYSTOLIC BLOOD PRESSURE: 128 MMHG | WEIGHT: 131.6 LBS | HEART RATE: 75 BPM

## 2018-11-01 DIAGNOSIS — R94.6 ABNORMAL THYROID FUNCTION TEST: ICD-10-CM

## 2018-11-01 DIAGNOSIS — L65.9 ALOPECIA: ICD-10-CM

## 2018-11-01 DIAGNOSIS — E27.9 ADRENAL DISORDER (HCC): ICD-10-CM

## 2018-11-01 DIAGNOSIS — L68.0 HIRSUTISM: ICD-10-CM

## 2018-11-01 DIAGNOSIS — Z72.0 TOBACCO ABUSE DISORDER: ICD-10-CM

## 2018-11-01 DIAGNOSIS — R73.03 PREDIABETES: ICD-10-CM

## 2018-11-01 DIAGNOSIS — D75.1 SECONDARY POLYCYTHEMIA: Primary | ICD-10-CM

## 2018-11-01 DIAGNOSIS — E61.1 IRON DEFICIENCY: ICD-10-CM

## 2018-11-01 PROCEDURE — 99214 OFFICE O/P EST MOD 30 MIN: CPT | Performed by: INTERNAL MEDICINE

## 2018-11-01 RX ORDER — NICOTINE POLACRILEX 2 MG
1 GUM BUCCAL
COMMUNITY
End: 2018-12-12 | Stop reason: SDUPTHER

## 2018-11-01 RX ORDER — PROPYLTHIOURACIL 50 MG/1
50 TABLET ORAL 3 TIMES DAILY
COMMUNITY
End: 2019-01-29

## 2018-11-01 NOTE — PROGRESS NOTES
Vielka Pratt is a 48 y.o. female patient that     comes for direct consultation request from   for my opinion regarding     Chief Complaint   Patient presents with   • thyroid issues           Referring provider    Dr. Joseph Smith   Primary Care Provider    Nick Neal MD    46 yo  Comes for consultation for abnl thyroid tests characterized by TSH of 0.324 and low nl free t4 at 0.93 from May 2017 but repeat from me from July 2017 were normal   Context, having alopecia  Severity, moderate  Alleviating, has tried prednisone, aldactone that couldn't tolerate, nizoral shampoo without relief    Also on metformin for prediabetes    Has history of adrenal enlargement, has hirsutism     Has had 17 lb weight loss       Past Medical History:   Diagnosis Date   • Abnormal granulation tissue    • Acute bacterial pharyngitis    • Acute tonsillitis, unspecified    • Allergic asthma     IgE-mediated allergic asthma      • Allergic rhinitis due to pollen    • Anxiety    • Asthma    • Back pain    • Backache    • Breast lump    • Cellulitis of trunk     Postoperative   • Chronic pain    • Corns and callus    • Cough    • Depressive disorder    • Diarrhea    • Dysphagia    • Dysuria    • Endometriosis    • Family history of malignant neoplasm of gastrointestinal tract    • GERD (gastroesophageal reflux disease)    • Hirsutism    • Hypertension    • Irritable bowel syndrome    • Leukorrhea, not specified as infective    • Mastitis    • Migraine    • Sebaceous cyst    • Shortness of breath    • Tick bite    • Tobacco abuse disorder 7/6/2017   • Tobacco user    • Upper respiratory infection      Family History   Problem Relation Age of Onset   • Cancer Other         other   • Colon cancer Other         Colorectal Cancer   • Diabetes Other    • Heart disease Other    • Hypertension Other    • Lung cancer Other    • Hypertension Mother    • Cancer Sister    • Heart attack Sister    • Heart  disease Sister    • Diabetes Brother      Social History   Substance Use Topics   • Smoking status: Current Every Day Smoker     Packs/day: 1.00   • Smokeless tobacco: Never Used   • Alcohol use Yes      Comment: Pt states she drinks less than once a month         Current Outpatient Prescriptions:   •  albuterol (VENTOLIN HFA) 108 (90 Base) MCG/ACT inhaler, 2 puffs every 4 hours as needed for breathing, Disp: 1 inhaler, Rfl: 5  •  Biotin 1 MG capsule, Take 1 tablet by mouth., Disp: , Rfl:   •  Biotin 1000 MCG tablet, Take 1,000 mcg by mouth., Disp: , Rfl:   •  BREO ELLIPTA 200-25 MCG/INH aerosol powder , Inhale 1 spray Daily., Disp: 1 each, Rfl: 5  •  carvedilol (COREG) 12.5 MG tablet, Take 12.5 mg by mouth 2 (Two) Times a Day With Meals., Disp: , Rfl:   •  celecoxib (CeleBREX) 200 MG capsule, Take 200 mg by mouth 2 (Two) Times a Day., Disp: , Rfl:   •  dicyclomine (BENTYL) 20 MG tablet, TAKE 1 TABLET BY MOUTH EVERY 6 HOURS., Disp: , Rfl:   •  escitalopram (LEXAPRO) 20 MG tablet, Take 20 mg by mouth Daily., Disp: , Rfl:   •  gabapentin (NEURONTIN) 800 MG tablet, Take 800 mg by mouth 3 (Three) Times a Day., Disp: , Rfl:   •  HYDROcodone-acetaminophen (NORCO) 7.5-325 MG per tablet, Take 1 tablet by mouth Every 6 (Six) Hours., Disp: , Rfl:   •  ketoconazole (NIZORAL) 2 % shampoo, Apply  topically., Disp: , Rfl:   •  metFORMIN (GLUCOPHAGE) 500 MG tablet, Take 500 mg by mouth 2 (Two) Times a Day With Meals., Disp: , Rfl:   •  montelukast (SINGULAIR) 10 MG tablet, Take 10 mg by mouth Every Night., Disp: , Rfl:   •  nitroglycerin (NITROSTAT) 0.4 MG SL tablet, Place 0.4 mg under the tongue Every 5 (Five) Minutes As Needed for Chest Pain. Take no more than 3 doses in 15 minutes., Disp: , Rfl:   •  ondansetron (ZOFRAN) 4 MG tablet, Take 1 tablet by mouth Every 6 (Six) Hours As Needed for Nausea or Vomiting., Disp: 10 tablet, Rfl: 0  •  oxybutynin (DITROPAN) 5 MG tablet, Take 5 mg by mouth 3 (Three) Times a Day., Disp: , Rfl:    •  pantoprazole (PROTONIX) 20 MG EC tablet, Take 20 mg by mouth Daily. Pt unsure of dosage, Disp: , Rfl:   •  PROAIR  (90 BASE) MCG/ACT inhaler, , Disp: , Rfl:   •  propylthiouracil (PTU) 50 MG tablet, Take 50 mg by mouth 3 (Three) Times a Day., Disp: , Rfl:   •  SUMAtriptan (IMITREX) 50 MG tablet, Take 50 mg by mouth., Disp: , Rfl:   •  TROKENDI  MG capsule sustained-release 24 hr, Take 1 tablet by mouth Daily., Disp: , Rfl:     Review of Systems    Review of Systems   Constitutional: Negative for activity change, appetite change, chills, diaphoresis, fatigue, fever and unexpected weight change.   HENT: Negative for congestion, dental problem, drooling, ear discharge, ear pain, facial swelling, mouth sores, postnasal drip, rhinorrhea, sinus pressure, sore throat, tinnitus, trouble swallowing and voice change.    Eyes: Negative for photophobia, pain, discharge, redness, itching and visual disturbance.   Respiratory: Negative for apnea, cough, choking, chest tightness, shortness of breath, wheezing and stridor.    Cardiovascular: Negative for chest pain, palpitations and leg swelling.   Gastrointestinal: Negative for abdominal distention, abdominal pain, constipation, diarrhea, nausea and vomiting.   Endocrine: Negative for cold intolerance, heat intolerance, polydipsia, polyphagia and polyuria.   Genitourinary: Negative for decreased urine volume, difficulty urinating, dysuria, flank pain, frequency, hematuria and urgency.   Musculoskeletal: Negative for arthralgias, back pain, gait problem, joint swelling, myalgias, neck pain and neck stiffness.   Skin: Negative for color change, pallor, rash and wound.        Alopecia,     Terminal hair in chin    Allergic/Immunologic: Negative for immunocompromised state.   Neurological: Negative for dizziness, tremors, seizures, syncope, facial asymmetry, speech difficulty, weakness, light-headedness, numbness and headaches.   Hematological: Negative for  "adenopathy.   Psychiatric/Behavioral: Negative for agitation, behavioral problems, confusion, decreased concentration, dysphoric mood, hallucinations, self-injury, sleep disturbance and suicidal ideas. The patient is not nervous/anxious and is not hyperactive.         Objective:   /74   Pulse 75   Ht 160 cm (63\")   Wt 59.7 kg (131 lb 9.6 oz)   SpO2 99%   BMI 23.31 kg/m²     Physical Exam   Constitutional: She is oriented to person, place, and time. She appears well-developed.   HENT:   Head: Normocephalic.   Right Ear: External ear normal.   Left Ear: External ear normal.   Nose: Nose normal.   Eyes: Conjunctivae and EOM are normal. No scleral icterus.   Neck: Normal range of motion. Neck supple. No tracheal deviation present. No thyromegaly present.   Cardiovascular: Normal rate, regular rhythm, normal heart sounds and intact distal pulses.  Exam reveals no gallop and no friction rub.    No murmur heard.  Pulmonary/Chest: Effort normal and breath sounds normal. No stridor. No respiratory distress. She has no wheezes. She has no rales. She exhibits no tenderness.   Abdominal: Soft. Bowel sounds are normal. She exhibits no distension and no mass. There is no tenderness. There is no rebound and no guarding.   Musculoskeletal: Normal range of motion. She exhibits no tenderness or deformity.   Lymphadenopathy:     She has no cervical adenopathy.   Neurological: She is alert and oriented to person, place, and time. She displays normal reflexes. She exhibits normal muscle tone. Coordination normal.   Skin: No rash noted. No erythema. No pallor.   Recently shaved terminal chin and upper lip hair    Psychiatric: She has a normal mood and affect. Her behavior is normal. Judgment and thought content normal.       Lab Review            Assessment/Plan       ICD-10-CM ICD-9-CM   1. 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 Abnormal thyroid function test R94.6 794.5   3. Prediabetes R73.03 790.29   4. Tobacco abuse " disorder Z72.0 305.1   5. Alopecia L65.9 704.00   6. Hirsutism L68.0 704.1       Alopecia and hirsutism    Component      Latest Ref Rng & Units 7/6/2017 7/14/2017   Iron      37 - 170 mcg/dL 35 (L)    TIBC      265 - 497 mcg/dL 313    Iron Saturation      15 - 50 % 11 (L)    Cortisol, Salivary      ug/dL  <0.010   Cortisol, Salivary #2      ug/dL  <0.010   Cortisol, Salivary #3      ug/dL  <0.010   Testosterone, Total      ng/dL 25.4    DHEA-Sulfate      41.2 - 243.7 ug/dL 71.3    ACTH      7.2 - 63.3 pg/mL 10.3    Cortisol - AM      mcg/dL 7.17        Has had ERLIN and bl oophorectomy, has history of adrenal disorder discovered while imaging her kidneys?     No Cushing's     No elevated testosterone       TSH , mildly depressed in 2017 but repeat assessment normal and she is on biotin     Stop PTU , no reason to have given it in the first place     Reassess TSH off biotin in 1 month            Component      Latest Ref Rng & Units 7/6/2017   TSH Baseline      0.460 - 4.680 mIU/mL 0.650   Free T4      0.78 - 2.19 ng/dL 0.93   T3, Total      97.0 - 169.0 ng/dl 134.0   Thyroid Stimulating Immunoglobulin      0 - 139 % 92   Thyroid Peroxidase Antibody      0 - 34 IU/mL 21     FLORENCE documented neg    Stop smoking for hair loss , sec polycythemia and all associated harm   Lab Results   Component Value Date    WBC 13.72 (H) 06/13/2017    HGB 16.9 (H) 06/13/2017    HCT 48.8 (H) 06/13/2017    MCV 92.2 06/13/2017     06/13/2017         Prediabetes, on metformin 500 mg po bid , May 2017 , Aic 5.7     --    Weight loss    Reassess TSH , reassess celiac , rule out Jefferson's and pheo       I reviewed and summarized records from Nick Neal MD and  from 2017 and I reviewed / ordered labs.     No orders of the defined types were placed in this encounter.        A copy of my note was sent to Nick Neal MD and      Please see my above opinion and suggestions.

## 2018-11-30 ENCOUNTER — APPOINTMENT (OUTPATIENT)
Dept: LAB | Facility: HOSPITAL | Age: 48
End: 2018-11-30

## 2018-11-30 PROCEDURE — 82533 TOTAL CORTISOL: CPT | Performed by: INTERNAL MEDICINE

## 2018-12-03 ENCOUNTER — APPOINTMENT (OUTPATIENT)
Dept: LAB | Facility: HOSPITAL | Age: 48
End: 2018-12-03

## 2018-12-03 LAB
ALBUMIN SERPL-MCNC: 5.2 G/DL (ref 3.4–4.8)
ALBUMIN/GLOB SERPL: 1.5 G/DL (ref 1.1–1.8)
ALP SERPL-CCNC: 75 U/L (ref 38–126)
ALT SERPL W P-5'-P-CCNC: 17 U/L (ref 9–52)
ANION GAP SERPL CALCULATED.3IONS-SCNC: 13 MMOL/L (ref 5–15)
AST SERPL-CCNC: 28 U/L (ref 14–36)
BASOPHILS # BLD AUTO: 0.02 10*3/MM3 (ref 0–0.2)
BASOPHILS NFR BLD AUTO: 0.3 % (ref 0–2)
BILIRUB SERPL-MCNC: 0.7 MG/DL (ref 0.2–1.3)
BUN BLD-MCNC: 15 MG/DL (ref 7–21)
BUN/CREAT SERPL: 19.7 (ref 7–25)
CALCIUM SPEC-SCNC: 9.9 MG/DL (ref 8.4–10.2)
CHLORIDE SERPL-SCNC: 103 MMOL/L (ref 95–110)
CO2 SERPL-SCNC: 22 MMOL/L (ref 22–31)
CORTIS AM PEAK SERPL-MCNC: 6.65 MCG/DL (ref 4.46–22.7)
CREAT BLD-MCNC: 0.76 MG/DL (ref 0.5–1)
DEPRECATED RDW RBC AUTO: 49.5 FL (ref 36.4–46.3)
EOSINOPHIL # BLD AUTO: 0.12 10*3/MM3 (ref 0–0.7)
EOSINOPHIL NFR BLD AUTO: 1.5 % (ref 0–7)
ERYTHROCYTE [DISTWIDTH] IN BLOOD BY AUTOMATED COUNT: 14.5 % (ref 11.5–14.5)
GFR SERPL CREATININE-BSD FRML MDRD: 82 ML/MIN/1.73 (ref 58–135)
GLOBULIN UR ELPH-MCNC: 3.4 GM/DL (ref 2.3–3.5)
GLUCOSE BLD-MCNC: 102 MG/DL (ref 60–100)
HCT VFR BLD AUTO: 49 % (ref 35–45)
HGB BLD-MCNC: 16.8 G/DL (ref 12–15.5)
IMM GRANULOCYTES # BLD: 0.03 10*3/MM3 (ref 0–0.02)
IMM GRANULOCYTES NFR BLD: 0.4 % (ref 0–0.5)
LYMPHOCYTES # BLD AUTO: 3.04 10*3/MM3 (ref 0.6–4.2)
LYMPHOCYTES NFR BLD AUTO: 38.4 % (ref 10–50)
MCH RBC QN AUTO: 31.8 PG (ref 26.5–34)
MCHC RBC AUTO-ENTMCNC: 34.3 G/DL (ref 31.4–36)
MCV RBC AUTO: 92.8 FL (ref 80–98)
MONOCYTES # BLD AUTO: 0.46 10*3/MM3 (ref 0–0.9)
MONOCYTES NFR BLD AUTO: 5.8 % (ref 0–12)
NEUTROPHILS # BLD AUTO: 4.24 10*3/MM3 (ref 2–8.6)
NEUTROPHILS NFR BLD AUTO: 53.6 % (ref 37–80)
PLATELET # BLD AUTO: 234 10*3/MM3 (ref 150–450)
PMV BLD AUTO: 10.3 FL (ref 8–12)
POTASSIUM BLD-SCNC: 3.8 MMOL/L (ref 3.5–5.1)
PROT SERPL-MCNC: 8.6 G/DL (ref 6.3–8.6)
RBC # BLD AUTO: 5.28 10*6/MM3 (ref 3.77–5.16)
SODIUM BLD-SCNC: 138 MMOL/L (ref 137–145)
T4 FREE SERPL-MCNC: 0.96 NG/DL (ref 0.78–2.19)
TSH SERPL DL<=0.05 MIU/L-ACNC: 0.68 MIU/ML (ref 0.46–4.68)
WBC NRBC COR # BLD: 7.91 10*3/MM3 (ref 3.2–9.8)

## 2018-12-03 PROCEDURE — 84244 ASSAY OF RENIN: CPT | Performed by: INTERNAL MEDICINE

## 2018-12-03 PROCEDURE — 80053 COMPREHEN METABOLIC PANEL: CPT | Performed by: INTERNAL MEDICINE

## 2018-12-03 PROCEDURE — 83516 IMMUNOASSAY NONANTIBODY: CPT | Performed by: INTERNAL MEDICINE

## 2018-12-03 PROCEDURE — 82627 DEHYDROEPIANDROSTERONE: CPT | Performed by: INTERNAL MEDICINE

## 2018-12-03 PROCEDURE — 82088 ASSAY OF ALDOSTERONE: CPT | Performed by: INTERNAL MEDICINE

## 2018-12-03 PROCEDURE — 82024 ASSAY OF ACTH: CPT | Performed by: INTERNAL MEDICINE

## 2018-12-03 PROCEDURE — 84403 ASSAY OF TOTAL TESTOSTERONE: CPT | Performed by: INTERNAL MEDICINE

## 2018-12-03 PROCEDURE — 83835 ASSAY OF METANEPHRINES: CPT | Performed by: INTERNAL MEDICINE

## 2018-12-03 PROCEDURE — 84481 FREE ASSAY (FT-3): CPT | Performed by: INTERNAL MEDICINE

## 2018-12-03 PROCEDURE — 84443 ASSAY THYROID STIM HORMONE: CPT | Performed by: INTERNAL MEDICINE

## 2018-12-03 PROCEDURE — 82784 ASSAY IGA/IGD/IGG/IGM EACH: CPT | Performed by: INTERNAL MEDICINE

## 2018-12-03 PROCEDURE — 85025 COMPLETE CBC W/AUTO DIFF WBC: CPT | Performed by: INTERNAL MEDICINE

## 2018-12-03 PROCEDURE — 82533 TOTAL CORTISOL: CPT | Performed by: INTERNAL MEDICINE

## 2018-12-03 PROCEDURE — 84439 ASSAY OF FREE THYROXINE: CPT | Performed by: INTERNAL MEDICINE

## 2018-12-03 PROCEDURE — 36415 COLL VENOUS BLD VENIPUNCTURE: CPT | Performed by: INTERNAL MEDICINE

## 2018-12-04 LAB
ACTH PLAS-MCNC: 18.8 PG/ML (ref 7.2–63.3)
DHEA-S SERPL-MCNC: 61.9 UG/DL (ref 41.2–243.7)
GLIADIN PEPTIDE IGA SER-ACNC: 7 UNITS (ref 0–19)
IGA SERPL-MCNC: 223 MG/DL (ref 87–352)
T3FREE SERPL-MCNC: 2.7 PG/ML (ref 2–4.4)
TESTOST SERPL-MCNC: 28 NG/DL (ref 8–48)
TTG IGA SER-ACNC: <2 U/ML (ref 0–3)

## 2018-12-05 LAB
CORTIS SAL-MCNC: 0.03 UG/DL
SALIVARY CORTISOL #2: <0.01 UG/DL
SALIVARY CORTISOL #3: <0.01 UG/DL
SALIVARY CORTISOL #4: <0.01 UG/DL

## 2018-12-07 LAB
ALDOST SERPL-MCNC: 5.5 NG/DL (ref 0–30)
ALDOST/RENIN PLAS-RTO: 5.8 {RATIO} (ref 0–30)
RENIN PLAS-CCNC: 0.94 NG/ML/HR (ref 0.17–5.38)

## 2018-12-09 LAB
METANEPHRINE, PL: <10 PG/ML (ref 0–62)
NORMETANEPHRINE, PL: 33 PG/ML (ref 0–145)

## 2018-12-12 ENCOUNTER — APPOINTMENT (OUTPATIENT)
Dept: LAB | Facility: HOSPITAL | Age: 48
End: 2018-12-12

## 2018-12-12 ENCOUNTER — OFFICE VISIT (OUTPATIENT)
Dept: PULMONOLOGY | Facility: CLINIC | Age: 48
End: 2018-12-12

## 2018-12-12 ENCOUNTER — HOSPITAL ENCOUNTER (OUTPATIENT)
Dept: GENERAL RADIOLOGY | Facility: HOSPITAL | Age: 48
Discharge: HOME | End: 2018-12-12
Attending: INTERNAL MEDICINE | Admitting: INTERNAL MEDICINE

## 2018-12-12 VITALS
HEART RATE: 91 BPM | BODY MASS INDEX: 22.09 KG/M2 | SYSTOLIC BLOOD PRESSURE: 116 MMHG | WEIGHT: 124.7 LBS | OXYGEN SATURATION: 98 % | HEIGHT: 63 IN | DIASTOLIC BLOOD PRESSURE: 87 MMHG

## 2018-12-12 DIAGNOSIS — J45.21 MILD INTERMITTENT ASTHMA WITH ACUTE EXACERBATION: Primary | ICD-10-CM

## 2018-12-12 DIAGNOSIS — Z91.018 FOOD ALLERGY: ICD-10-CM

## 2018-12-12 DIAGNOSIS — R63.4 WEIGHT LOSS: ICD-10-CM

## 2018-12-12 PROCEDURE — 86003 ALLG SPEC IGE CRUDE XTRC EA: CPT | Performed by: INTERNAL MEDICINE

## 2018-12-12 PROCEDURE — 36415 COLL VENOUS BLD VENIPUNCTURE: CPT | Performed by: INTERNAL MEDICINE

## 2018-12-12 PROCEDURE — 71046 X-RAY EXAM CHEST 2 VIEWS: CPT

## 2018-12-12 PROCEDURE — 99214 OFFICE O/P EST MOD 30 MIN: CPT | Performed by: INTERNAL MEDICINE

## 2018-12-12 RX ORDER — SPIRONOLACTONE 50 MG/1
50 TABLET, FILM COATED ORAL DAILY
COMMUNITY
End: 2021-05-19

## 2018-12-12 RX ORDER — PREGABALIN 75 MG/1
75 CAPSULE ORAL
COMMUNITY
Start: 2018-07-26 | End: 2021-05-19

## 2018-12-12 RX ORDER — RANITIDINE 150 MG/1
150 TABLET ORAL
COMMUNITY
Start: 2018-08-15 | End: 2019-08-16

## 2018-12-12 NOTE — PROGRESS NOTES
"This 48-year-old lady has asthma and weight loss.  She also had a blood test positive for alpha gal syndrome and has difficulty when eating meals.  She denies urticaria.  She also has had some weight loss and poor appetite recently    ROS    Constitutional-no night sweats weight loss headaches  GI no abdominal pain nausea or diarrhea  Neuro no seizure or neurologic deficits  Musculoskeletal no deformity or joint pain   no dysuria or hematuria  Skin no rash or other lesions  All other systems reviewed and were negative except for the above.      Physical Exam  /87   Pulse 91   Ht 160 cm (63\")   Wt 56.6 kg (124 lb 11.2 oz)   SpO2 98%   Breastfeeding? No   BMI 22.09 kg/m²   Vital signs as above  Pupils equally round and reactive to light and accommodation, neck no JVD or adenopathy.  Cardiovascular regular rhythm and rate no murmur or gallop.  Abdomen soft no organomegaly tenderness.  Extremities no clubbing cyanosis or edema.  No cervical adenopathy.  No skin rash.  Neurologic good strength bilaterally without deficits  Lungs are clear diminished breath sounds skin no rash    Plan asthma, history of alpha gal, weight loss    Plan breo inhaler, chest x-ray, lab work today, return in 2 weeks        This document has been produced with the assistance of Dragon dictation  This document has been electronically signed by Percy Sanders MD on December 12, 2018 1:26 PM          "

## 2018-12-15 LAB
CALIF WALNUT POLN IGE QN: <0.1 KU/L
CLAM IGE QN: <0.1 KU/L
CODFISH IGE QN: <0.1 KU/L
CONV CLASS DESCRIPTION: ABNORMAL
CORN IGE QN: <0.1 KU/L
COW MILK IGE QN: 3.08 KU/L
EGG WHITE IGE QN: 0.63 KU/L
LAMB IGE QN: 8.72 KU/L
PEANUT IGE QN: <0.1 KU/L
SCALLOP IGE QN: 0.32 KU/L
SESAME SEED IGE: <0.1 KU/L
SHRIMP IGE: <0.1 KU/L
SOYBEAN IGE QN: <0.1 KU/L
WHEAT IGE QN: <0.1 KU/L

## 2018-12-19 ENCOUNTER — TELEPHONE (OUTPATIENT)
Dept: FAMILY MEDICINE CLINIC | Facility: CLINIC | Age: 48
End: 2018-12-19

## 2018-12-19 NOTE — TELEPHONE ENCOUNTER
Patient called and said she missed her appt the other day due to car trouble. She is wanting to see if Job can just call her with her test results. Call her at [phone]

## 2018-12-19 NOTE — TELEPHONE ENCOUNTER
Patient called and said she missed her appt the other day due to car trouble. She is wanting to see if Job can just call her with her test results. Call her at [phone]         Documentation      She has not set up a new appt.   Thanks, Teri

## 2018-12-26 ENCOUNTER — OFFICE VISIT (OUTPATIENT)
Dept: PULMONOLOGY | Facility: CLINIC | Age: 48
End: 2018-12-26

## 2018-12-26 VITALS
WEIGHT: 127.8 LBS | SYSTOLIC BLOOD PRESSURE: 140 MMHG | HEIGHT: 63 IN | OXYGEN SATURATION: 99 % | DIASTOLIC BLOOD PRESSURE: 94 MMHG | BODY MASS INDEX: 22.64 KG/M2 | HEART RATE: 69 BPM

## 2018-12-26 DIAGNOSIS — M75.50 BURSITIS OF SHOULDER, UNSPECIFIED LATERALITY: ICD-10-CM

## 2018-12-26 DIAGNOSIS — Z91.018 FOOD ALLERGY: ICD-10-CM

## 2018-12-26 DIAGNOSIS — J45.30 MILD PERSISTENT ASTHMA, UNSPECIFIED WHETHER COMPLICATED: Primary | ICD-10-CM

## 2018-12-26 PROCEDURE — 96372 THER/PROPH/DIAG INJ SC/IM: CPT | Performed by: INTERNAL MEDICINE

## 2018-12-26 PROCEDURE — 99214 OFFICE O/P EST MOD 30 MIN: CPT | Performed by: INTERNAL MEDICINE

## 2018-12-26 RX ORDER — METHYLPREDNISOLONE ACETATE 40 MG/ML
80 INJECTION, SUSPENSION INTRA-ARTICULAR; INTRALESIONAL; INTRAMUSCULAR; SOFT TISSUE ONCE
Status: COMPLETED | OUTPATIENT
Start: 2018-12-26 | End: 2018-12-26

## 2018-12-26 RX ADMIN — METHYLPREDNISOLONE ACETATE 80 MG: 40 INJECTION, SUSPENSION INTRA-ARTICULAR; INTRALESIONAL; INTRAMUSCULAR; SOFT TISSUE at 14:41

## 2018-12-26 NOTE — PROGRESS NOTES
"This lady has asthma and food allergy.  She also complains of severe pain in her right shoulder for several weeks.  Her breathing is doing fairly well.  She is intolerant of most meats.    ROS    Constitutional-no night sweats weight loss headaches  GI no abdominal pain nausea or diarrhea  Neuro no seizure or neurologic deficits  Musculoskeletal no deformity or joint pain   no dysuria or hematuria  Skin no rash or other lesions  All other systems reviewed and were negative except for the above.      Physical Exam  /94   Pulse 69   Ht 160 cm (63\")   Wt 58 kg (127 lb 12.8 oz)   SpO2 99%   BMI 22.64 kg/m²   Vital signs as above  Pupils equally round and reactive to light and accommodation, neck no JVD or adenopathy.  Cardiovascular regular rhythm and rate no murmur or gallop.  Abdomen soft no organomegaly tenderness.  Extremities no clubbing cyanosis or edema.  No cervical adenopathy.  No skin rash.  Neurologic good strength bilaterally without deficits  Alert lungs are clear right shoulder is tender to palpation    Lab work reveals multiple food allergies    Impression alpha gal syndrome, multiple food allergies, shoulder bursitis, asthma    Plan Depo-Medrol, avoidance of foods on her list, continue asthma medications, refrain from smoking, return in 3 months        This document has been produced with the assistance of Dragon dictation  This document has been electronically signed by Percy Sanders MD on December 26, 2018 2:15 PM      "

## 2019-01-07 ENCOUNTER — OFFICE VISIT (OUTPATIENT)
Dept: ENDOCRINOLOGY | Facility: CLINIC | Age: 49
End: 2019-01-07

## 2019-01-07 VITALS
HEART RATE: 78 BPM | BODY MASS INDEX: 22.32 KG/M2 | DIASTOLIC BLOOD PRESSURE: 80 MMHG | SYSTOLIC BLOOD PRESSURE: 118 MMHG | WEIGHT: 126 LBS | HEIGHT: 63 IN

## 2019-01-07 DIAGNOSIS — D75.1 SECONDARY POLYCYTHEMIA: Primary | ICD-10-CM

## 2019-01-07 DIAGNOSIS — Z72.0 TOBACCO ABUSE DISORDER: ICD-10-CM

## 2019-01-07 PROCEDURE — 99214 OFFICE O/P EST MOD 30 MIN: CPT | Performed by: NURSE PRACTITIONER

## 2019-01-07 NOTE — PROGRESS NOTES
Subjective    Vielka Pratt is a 48 y.o. female. she is here today for follow-up.    History of Present Illness       Nick Neal MD     46 yo  Comes for consultation for abnl thyroid tests characterized by TSH of 0.324 and low nl free t4 at 0.93 from May 2017 but repeat from me from July 2017 were normal   Context, having alopecia  Severity, moderate  Alleviating, has tried prednisone, aldactone that couldn't tolerate, nizoral shampoo without relief     Also on metformin for prediabetes     Has history of adrenal enlargement, has hirsutism      Has had 17 lb weight loss              The following portions of the patient's history were reviewed and updated as appropriate:   Past Medical History:   Diagnosis Date   • Abnormal granulation tissue    • Acute bacterial pharyngitis    • Acute tonsillitis, unspecified    • Allergic asthma     IgE-mediated allergic asthma      • Allergic rhinitis due to pollen    • Anxiety    • Asthma    • Back pain    • Backache    • Breast lump    • Cellulitis of trunk     Postoperative   • Chronic pain    • Corns and callus    • Cough    • Depressive disorder    • Diarrhea    • Dysphagia    • Dysuria    • Endometriosis    • Family history of malignant neoplasm of gastrointestinal tract    • GERD (gastroesophageal reflux disease)    • Hirsutism    • Hypertension    • Irritable bowel syndrome    • Leukorrhea, not specified as infective    • Mastitis    • Migraine    • Sebaceous cyst    • Shortness of breath    • Tick bite    • Tobacco abuse disorder 7/6/2017   • Tobacco user    • Upper respiratory infection      Past Surgical History:   Procedure Laterality Date   • COLONOSCOPY N/A 4/2/2018    Procedure: COLONOSCOPY;  Surgeon: Van Romano DO;  Location: [facility] ENDOSCOPY;  Service: Gastroenterology   • DIAGNOSTIC LAPAROSCOPY  04/08/2008    Laparosc diagnostic (2)      • ENDOSCOPY  03/26/2013    Colon endoscopy 41012 (2)      • ENDOSCOPY N/A 4/2/2018     Procedure: ESOPHAGOGASTRODUODENOSCOPY;  Surgeon: Van Romano DO;  Location: [facility] ENDOSCOPY;  Service: Gastroenterology   • ENDOSCOPY W/ PEG TUBE PLACEMENT  03/26/2013    EGD w/ tube 22544 (2)      • EXCISION LESION  01/30/2013    EXC TR-EXT Benign+Irina 0.6-1 CM 25308 (1)      • HAND TENOLYSIS  07/23/1992    Hand/finger surgery (1)      • HEAD & NECK SKIN LESION EXCISIONAL BIOPSY  01/31/1994    Biopsy of Skin 63155 (2)      • INCISION AND DRAINAGE BREAST ABSCESS  12/03/2012    Anesth, surgery of breast (1)      • INJECTION OF MEDICATION  09/12/2011    B12 (1)      • INJECTION OF MEDICATION  04/28/2015    Celestone (betamethasone) (1)     • INJECTION OF MEDICATION  08/17/2015    Depo Medrol (Methylprednisone) (6)      • INJECTION OF MEDICATION  06/08/2012    Kenalog (3)      • INJECTION OF MEDICATION  06/08/2012    Toradol (1)      • NASOLACRIMAL DUCT PROBING  08/16/1973    Probe nasolacrimal duct (1)      • NEPHROSTOMY TRACT DILATATION W/ LITHOTRIPSY     • PAP SMEAR  03/20/2008    PAP SMEAR (1)      • TOTAL ABDOMINAL HYSTERECTOMY WITH SALPINGO OOPHORECTOMY  04/09/2012    ERLIN/BSO (1)        Family History   Problem Relation Age of Onset   • Cancer Other         other   • Colon cancer Other         Colorectal Cancer   • Diabetes Other    • Heart disease Other    • Hypertension Other    • Lung cancer Other    • Hypertension Mother    • Cancer Sister    • Heart attack Sister    • Heart disease Sister    • Diabetes Brother      OB History     No data available        Current Outpatient Medications   Medication Sig Dispense Refill   • albuterol (VENTOLIN HFA) 108 (90 Base) MCG/ACT inhaler 2 puffs every 4 hours as needed for breathing 1 inhaler 5   • Biotin 1000 MCG tablet Take 1,000 mcg by mouth.     • BREO ELLIPTA 200-25 MCG/INH aerosol powder  inhaler Inhale 1 puff Daily. 1 each 5   • carvedilol (COREG) 12.5 MG tablet Take 12.5 mg by mouth 2 (Two) Times a Day With Meals.     • celecoxib (CeleBREX) 200 MG capsule  Take 200 mg by mouth 2 (Two) Times a Day.     • dicyclomine (BENTYL) 20 MG tablet TAKE 1 TABLET BY MOUTH EVERY 6 HOURS.     • escitalopram (LEXAPRO) 20 MG tablet Take 20 mg by mouth Daily.     • gabapentin (NEURONTIN) 800 MG tablet Take 800 mg by mouth 3 (Three) Times a Day.     • HYDROcodone-acetaminophen (NORCO) 7.5-325 MG per tablet Take 1 tablet by mouth Every 6 (Six) Hours.     • ketoconazole (NIZORAL) 2 % shampoo Apply  topically.     • metFORMIN (GLUCOPHAGE) 500 MG tablet Take 500 mg by mouth 2 (Two) Times a Day With Meals.     • montelukast (SINGULAIR) 10 MG tablet Take 10 mg by mouth Every Night.     • nitroglycerin (NITROSTAT) 0.4 MG SL tablet Place 0.4 mg under the tongue Every 5 (Five) Minutes As Needed for Chest Pain. Take no more than 3 doses in 15 minutes.     • ondansetron (ZOFRAN) 4 MG tablet Take 1 tablet by mouth Every 6 (Six) Hours As Needed for Nausea or Vomiting. 10 tablet 0   • oxybutynin (DITROPAN) 5 MG tablet Take 5 mg by mouth 3 (Three) Times a Day.     • pantoprazole (PROTONIX) 20 MG EC tablet Take 20 mg by mouth Daily. Pt unsure of dosage     • pregabalin (LYRICA) 75 MG capsule Take 75 mg by mouth.     • PROAIR  (90 BASE) MCG/ACT inhaler      • propylthiouracil (PTU) 50 MG tablet Take 50 mg by mouth 3 (Three) Times a Day.     • raNITIdine (ZANTAC) 150 MG tablet Take 150 mg by mouth.     • spironolactone (ALDACTONE) 50 MG tablet Take 50 mg by mouth Daily.     • SUMAtriptan (IMITREX) 50 MG tablet Take 50 mg by mouth.     • TROKENDI  MG capsule sustained-release 24 hr Take 1 tablet by mouth Daily.       No current facility-administered medications for this visit.      Allergies   Allergen Reactions   • Contrast Dye Hives   • Iodinated Diagnostic Agents Hives and Rash   • Iron Nausea And Vomiting   • Naproxen Rash   • Penicillins Rash   • Sulfur Rash   • Tetracyclines & Related Rash     Social History     Socioeconomic History   • Marital status:      Spouse name: Not on  "file   • Number of children: Not on file   • Years of education: Not on file   • Highest education level: Not on file   Tobacco Use   • Smoking status: Current Every Day Smoker     Packs/day: 1.00   • Smokeless tobacco: Never Used   Substance and Sexual Activity   • Alcohol use: Yes     Comment: Pt states she drinks less than once a month   • Drug use: No       Review of Systems  Review of Systems   Constitutional: Positive for fatigue and unexpected weight change. Negative for activity change, appetite change and diaphoresis.   HENT: Negative for facial swelling, sneezing, sore throat, tinnitus, trouble swallowing and voice change.    Eyes: Negative for photophobia, pain, discharge, redness, itching and visual disturbance.   Respiratory: Negative for apnea, cough, choking, chest tightness and shortness of breath.    Cardiovascular: Negative for chest pain, palpitations and leg swelling.   Gastrointestinal: Negative for abdominal distention, abdominal pain, constipation, diarrhea, nausea and vomiting.   Endocrine: Negative for cold intolerance, heat intolerance, polydipsia, polyphagia and polyuria.   Genitourinary: Negative for difficulty urinating, dysuria, frequency, hematuria and urgency.   Musculoskeletal: Negative for arthralgias, back pain, gait problem, joint swelling, myalgias, neck pain and neck stiffness.   Skin: Negative for color change, pallor, rash and wound.        Hair loss   Neurological: Negative for dizziness, tremors, weakness, light-headedness, numbness and headaches.   Hematological: Negative for adenopathy. Does not bruise/bleed easily.   Psychiatric/Behavioral: Negative for behavioral problems, confusion and sleep disturbance.        Objective    /80 (BP Location: Right arm, Patient Position: Sitting, Cuff Size: Adult)   Pulse 78   Ht 160 cm (63\")   Wt 57.2 kg (126 lb)   BMI 22.32 kg/m²   Physical Exam   Constitutional: She is oriented to person, place, and time. She appears " well-developed and well-nourished. No distress.   HENT:   Head: Normocephalic and atraumatic.   Right Ear: External ear normal.   Left Ear: External ear normal.   Nose: Nose normal.   Eyes: Conjunctivae and EOM are normal. Pupils are equal, round, and reactive to light.   Neck: Normal range of motion. Neck supple. No tracheal deviation present. No thyromegaly present.   Cardiovascular: Normal rate, regular rhythm and normal heart sounds.   No murmur heard.  Pulmonary/Chest: Effort normal and breath sounds normal. No respiratory distress. She has no wheezes.   Abdominal: Soft. Bowel sounds are normal. There is no tenderness. There is no rebound and no guarding.   Musculoskeletal: Normal range of motion. She exhibits no edema, tenderness or deformity.   Neurological: She is alert and oriented to person, place, and time. No cranial nerve deficit.   Skin: Skin is warm and dry. No rash noted.   Psychiatric: She has a normal mood and affect. Her behavior is normal. Judgment and thought content normal.       Lab Review  Glucose (mg/dL)   Date Value   12/03/2018 102 (H)   06/13/2017 92   06/12/2017 98     Sodium (mmol/L)   Date Value   12/03/2018 138   06/13/2017 143   06/12/2017 141     Potassium (mmol/L)   Date Value   12/03/2018 3.8   06/13/2017 4.1   06/12/2017 3.9     Chloride (mmol/L)   Date Value   12/03/2018 103   06/13/2017 112 (H)   06/12/2017 110     CO2 (mmol/L)   Date Value   12/03/2018 22.0   06/13/2017 21.0 (L)   06/12/2017 25.0     BUN (mg/dL)   Date Value   12/03/2018 15   06/13/2017 11   06/12/2017 12     Creatinine (mg/dL)   Date Value   12/03/2018 0.76   06/13/2017 0.67   06/12/2017 0.77     Triglycerides (mg/dL)   Date Value   06/12/2017 262 (H)     LDL Cholesterol  (mg/dL)   Date Value   06/12/2017 139 (H)       Assessment/Plan      1. Secondary polycythemia    2. Tobacco abuse disorder    .    Medications prescribed:  Outpatient Encounter Medications as of 1/7/2019   Medication Sig Dispense Refill   •  albuterol (VENTOLIN HFA) 108 (90 Base) MCG/ACT inhaler 2 puffs every 4 hours as needed for breathing 1 inhaler 5   • Biotin 1000 MCG tablet Take 1,000 mcg by mouth.     • BREO ELLIPTA 200-25 MCG/INH aerosol powder  inhaler Inhale 1 puff Daily. 1 each 5   • carvedilol (COREG) 12.5 MG tablet Take 12.5 mg by mouth 2 (Two) Times a Day With Meals.     • celecoxib (CeleBREX) 200 MG capsule Take 200 mg by mouth 2 (Two) Times a Day.     • dicyclomine (BENTYL) 20 MG tablet TAKE 1 TABLET BY MOUTH EVERY 6 HOURS.     • escitalopram (LEXAPRO) 20 MG tablet Take 20 mg by mouth Daily.     • gabapentin (NEURONTIN) 800 MG tablet Take 800 mg by mouth 3 (Three) Times a Day.     • HYDROcodone-acetaminophen (NORCO) 7.5-325 MG per tablet Take 1 tablet by mouth Every 6 (Six) Hours.     • ketoconazole (NIZORAL) 2 % shampoo Apply  topically.     • metFORMIN (GLUCOPHAGE) 500 MG tablet Take 500 mg by mouth 2 (Two) Times a Day With Meals.     • montelukast (SINGULAIR) 10 MG tablet Take 10 mg by mouth Every Night.     • nitroglycerin (NITROSTAT) 0.4 MG SL tablet Place 0.4 mg under the tongue Every 5 (Five) Minutes As Needed for Chest Pain. Take no more than 3 doses in 15 minutes.     • ondansetron (ZOFRAN) 4 MG tablet Take 1 tablet by mouth Every 6 (Six) Hours As Needed for Nausea or Vomiting. 10 tablet 0   • oxybutynin (DITROPAN) 5 MG tablet Take 5 mg by mouth 3 (Three) Times a Day.     • pantoprazole (PROTONIX) 20 MG EC tablet Take 20 mg by mouth Daily. Pt unsure of dosage     • pregabalin (LYRICA) 75 MG capsule Take 75 mg by mouth.     • PROAIR  (90 BASE) MCG/ACT inhaler      • propylthiouracil (PTU) 50 MG tablet Take 50 mg by mouth 3 (Three) Times a Day.     • raNITIdine (ZANTAC) 150 MG tablet Take 150 mg by mouth.     • spironolactone (ALDACTONE) 50 MG tablet Take 50 mg by mouth Daily.     • SUMAtriptan (IMITREX) 50 MG tablet Take 50 mg by mouth.     • TROKENDI  MG capsule sustained-release 24 hr Take 1 tablet by mouth Daily.        No facility-administered encounter medications on file as of 1/7/2019.        Orders placed during this encounter include:  Orders Placed This Encounter   Procedures   • Ambulatory Referral to Hematology     Referral Priority:   Routine     Referral Type:   Consultation     Referral Reason:   Specialty Services Required     Requested Specialty:   Hematology     Number of Visits Requested:   1   Alopecia and hirsutism     Component      Latest Ref Rng & Units 7/6/2017 7/14/2017   Iron      37 - 170 mcg/dL 35 (L)     TIBC      265 - 497 mcg/dL 313     Iron Saturation      15 - 50 % 11 (L)     Cortisol, Salivary      ug/dL   <0.010   Cortisol, Salivary #2      ug/dL   <0.010   Cortisol, Salivary #3      ug/dL   <0.010   Testosterone, Total      ng/dL 25.4     DHEA-Sulfate      41.2 - 243.7 ug/dL 71.3     ACTH      7.2 - 63.3 pg/mL 10.3     Cortisol - AM      mcg/dL 7.17           Has had ERLIN and bl oophorectomy,    CT of the abdomen in May 2017-- showed adrenals had normal appearance with a nonenhanced imaging     No Cushing's      No elevated testosterone         TSH , mildly depressed in 2017 but repeat assessment normal and she is on biotin      Stop PTU , no reason to have given it in the first place      Reassess TSH off biotin in 1 month                 Component      Latest Ref Rng & Units 7/6/2017   TSH Baseline      0.460 - 4.680 mIU/mL 0.650   Free T4      0.78 - 2.19 ng/dL 0.93   T3, Total      97.0 - 169.0 ng/dl 134.0   Thyroid Stimulating Immunoglobulin      0 - 139 % 92   Thyroid Peroxidase Antibody      0 - 34 IU/mL 21            Component      Latest Ref Rng & Units 12/3/2018   TSH Baseline      0.460 - 4.680 mIU/mL 0.680   Free T4      0.78 - 2.19 ng/dL 0.96   T3, Free      2.0 - 4.4 pg/mL 2.7       No need for PTU     FLORENCE documented neg     Stop smoking for hair loss , sec polycythemia and all associated harm       Component      Latest Ref Rng & Units 12/3/2018   WBC      3.20 - 9.80 10*3/mm3  7.91   RBC      3.77 - 5.16 10*6/mm3 5.28 (H)   Hemoglobin      12.0 - 15.5 g/dL 16.8 (H)   Hematocrit      35.0 - 45.0 % 49.0 (H)   MCV      80.0 - 98.0 fL 92.8   MCH      26.5 - 34.0 pg 31.8   MCHC      31.4 - 36.0 g/dL 34.3   RDW      11.5 - 14.5 % 14.5   RDW-SD      36.4 - 46.3 fl 49.5 (H)   MPV      8.0 - 12.0 fL 10.3   Platelets      150 - 450 10*3/mm3 234   Neutrophil %      37.0 - 80.0 % 53.6   Lymphocyte %      10.0 - 50.0 % 38.4   Monocyte %      0.0 - 12.0 % 5.8   Eosinophil %      0.0 - 7.0 % 1.5   Basophil %      0.0 - 2.0 % 0.3   Immature Grans %      0.0 - 0.5 % 0.4   Neutrophils, Absolute      2.00 - 8.60 10*3/mm3 4.24   Lymphocytes, Absolute      0.60 - 4.20 10*3/mm3 3.04   Monocytes, Absolute      0.00 - 0.90 10*3/mm3 0.46   Eosinophils, Absolute      0.00 - 0.70 10*3/mm3 0.12   Basophils, Absolute      0.00 - 0.20 10*3/mm3 0.02   Immature Grans, Absolute      0.00 - 0.02 10*3/mm3 0.03 (H)           due to polycythemia, weight loss will refer to hematology     States no sleep apnea     Prediabetes, on metformin 500 mg po bid , May 2017 , Aic 5.7      --     Weight loss     Reassess TSH , reassess celiac , rule out [place]'s and pheo     Adrenal testing was normal     Celiac negative         Component      Latest Ref Rng & Units 12/3/2018   Aldosterone      0.0 - 30.0 ng/dL 5.5   Renin Activity      0.167 - 5.380 ng/mL/hr 0.941   Aldosterone/Renin Ratio      0.0 - 30.0 5.8   Gliadin Deamidated Peptide Ab, IgA      0 - 19 units 7   Tissue Transglutaminase IgA      0 - 3 U/mL <2   IgA      87 - 352 mg/dL 223   Normetanephrine      0 - 145 pg/mL 33   Metanephrine      0 - 62 pg/mL <10   Testosterone, Total      8 - 48 ng/dL 28   DHEA-Sulfate      41.2 - 243.7 ug/dL 61.9   ACTH      7.2 - 63.3 pg/mL 18.8   Cortisol - AM      4.46 - 22.70 mcg/dL 6.65       Will follow up with us if thyroid becomes abnormal or development of diabetes    Will continue to follow with primary          4. Follow-up: No  Follow-up on file.

## 2019-01-17 ENCOUNTER — APPOINTMENT (OUTPATIENT)
Dept: ONCOLOGY | Facility: CLINIC | Age: 49
End: 2019-01-17

## 2019-01-17 ENCOUNTER — APPOINTMENT (OUTPATIENT)
Dept: ONCOLOGY | Facility: HOSPITAL | Age: 49
End: 2019-01-17

## 2019-01-23 ENCOUNTER — APPOINTMENT (OUTPATIENT)
Dept: ONCOLOGY | Facility: HOSPITAL | Age: 49
End: 2019-01-23

## 2019-01-29 ENCOUNTER — OFFICE VISIT (OUTPATIENT)
Dept: ORTHOPEDIC SURGERY | Facility: CLINIC | Age: 49
End: 2019-01-29

## 2019-01-29 VITALS — HEIGHT: 63 IN | WEIGHT: 128 LBS | BODY MASS INDEX: 22.68 KG/M2

## 2019-01-29 DIAGNOSIS — M54.2 NECK PAIN: ICD-10-CM

## 2019-01-29 DIAGNOSIS — M75.41 IMPINGEMENT SYNDROME OF RIGHT SHOULDER: ICD-10-CM

## 2019-01-29 DIAGNOSIS — M25.511 ACUTE PAIN OF RIGHT SHOULDER: Primary | ICD-10-CM

## 2019-01-29 DIAGNOSIS — M75.101 ROTATOR CUFF SYNDROME OF RIGHT SHOULDER: ICD-10-CM

## 2019-01-29 PROCEDURE — 99214 OFFICE O/P EST MOD 30 MIN: CPT | Performed by: NURSE PRACTITIONER

## 2019-01-29 PROCEDURE — 20610 DRAIN/INJ JOINT/BURSA W/O US: CPT | Performed by: NURSE PRACTITIONER

## 2019-01-29 RX ADMIN — LIDOCAINE HYDROCHLORIDE 2 ML: 10 INJECTION, SOLUTION EPIDURAL; INFILTRATION; INTRACAUDAL; PERINEURAL at 13:34

## 2019-01-29 RX ADMIN — TRIAMCINOLONE ACETONIDE 40 MG: 40 INJECTION, SUSPENSION INTRA-ARTICULAR; INTRAMUSCULAR at 13:34

## 2019-01-29 NOTE — PROGRESS NOTES
Vielka Pratt is a 48 y.o. female   Primary provider:  Nick Neal MD       Chief Complaint   Patient presents with   • Right Shoulder - Pain   • Establish Care       HISTORY OF PRESENT ILLNESS:     48-year-old female patient presents to office for evaluation of right arm/shoulder pain and neck pain.  Onset occurred approximately 6 weeks ago.  Patient denies any known injury or incident associated with the onset of pain.  Patient has not been evaluated for her right shoulder pain with no prior studies done.  Pain is described as constant and moderate to severe.  Pain is described as aching in nature with associated popping sensations.  Pain is worse with movement/use of her right arm/shoulder. No increased pain with movement of her neck.  Pain improves mildly with anti-inflammatory medications and rest.  Patient takes Norco for chronic back pain as prescribed by her primary care physician, Dr. Neal.      Pain   This is a new problem. The current episode started more than 1 month ago (About 6 weeks ago). The problem occurs constantly. The problem has been gradually worsening. Associated symptoms include arthralgias, headaches and myalgias. Associated symptoms comments: Aching pain, popping sensations. . Exacerbated by: movement/use of her right arm/shoulder. She has tried acetaminophen, NSAIDs, oral narcotics and rest for the symptoms. The treatment provided mild relief.        CONCURRENT MEDICAL HISTORY:    Past Medical History:   Diagnosis Date   • Abnormal granulation tissue    • Acute bacterial pharyngitis    • Acute tonsillitis, unspecified    • Allergic asthma     IgE-mediated allergic asthma      • Allergic rhinitis due to pollen    • Anxiety    • Asthma    • Back pain    • Backache    • Breast lump    • Cellulitis of trunk     Postoperative   • Chronic pain    • Corns and callus    • Cough    • Depressive disorder    • Diarrhea    • Dysphagia    • Dysuria    • Endometriosis     • Family history of malignant neoplasm of gastrointestinal tract    • GERD (gastroesophageal reflux disease)    • Hirsutism    • Hypertension    • Irritable bowel syndrome    • Leukorrhea, not specified as infective    • Mastitis    • Migraine    • Sebaceous cyst    • Shortness of breath    • Tick bite    • Tobacco abuse disorder 7/6/2017   • Tobacco user    • Upper respiratory infection        Allergies   Allergen Reactions   • Contrast Dye Hives   • Iodinated Diagnostic Agents Hives and Rash   • Iron Nausea And Vomiting   • Naproxen Rash   • Penicillins Rash   • Sulfur Rash   • Tetracyclines & Related Rash         Current Outpatient Medications:   •  albuterol (VENTOLIN HFA) 108 (90 Base) MCG/ACT inhaler, 2 puffs every 4 hours as needed for breathing, Disp: 1 inhaler, Rfl: 5  •  Biotin 1000 MCG tablet, Take 1,000 mcg by mouth., Disp: , Rfl:   •  BREO ELLIPTA 200-25 MCG/INH aerosol powder  inhaler, Inhale 1 puff Daily., Disp: 1 each, Rfl: 5  •  carvedilol (COREG) 12.5 MG tablet, Take 12.5 mg by mouth 2 (Two) Times a Day With Meals., Disp: , Rfl:   •  celecoxib (CeleBREX) 200 MG capsule, Take 200 mg by mouth 2 (Two) Times a Day., Disp: , Rfl:   •  dicyclomine (BENTYL) 20 MG tablet, TAKE 1 TABLET BY MOUTH EVERY 6 HOURS., Disp: , Rfl:   •  escitalopram (LEXAPRO) 20 MG tablet, Take 20 mg by mouth Daily., Disp: , Rfl:   •  HYDROcodone-acetaminophen (NORCO) 7.5-325 MG per tablet, Take 1 tablet by mouth Every 6 (Six) Hours., Disp: , Rfl:   •  ketoconazole (NIZORAL) 2 % shampoo, Apply  topically., Disp: , Rfl:   •  metFORMIN (GLUCOPHAGE) 500 MG tablet, Take 500 mg by mouth 2 (Two) Times a Day With Meals., Disp: , Rfl:   •  montelukast (SINGULAIR) 10 MG tablet, Take 10 mg by mouth Every Night., Disp: , Rfl:   •  ondansetron (ZOFRAN) 4 MG tablet, Take 1 tablet by mouth Every 6 (Six) Hours As Needed for Nausea or Vomiting., Disp: 10 tablet, Rfl: 0  •  oxybutynin (DITROPAN) 5 MG tablet, Take 5 mg by mouth 3 (Three) Times a  Day., Disp: , Rfl:   •  pantoprazole (PROTONIX) 20 MG EC tablet, Take 20 mg by mouth Daily. Pt unsure of dosage, Disp: , Rfl:   •  pregabalin (LYRICA) 75 MG capsule, Take 75 mg by mouth., Disp: , Rfl:   •  PROAIR  (90 BASE) MCG/ACT inhaler, , Disp: , Rfl:   •  raNITIdine (ZANTAC) 150 MG tablet, Take 150 mg by mouth., Disp: , Rfl:   •  spironolactone (ALDACTONE) 50 MG tablet, Take 50 mg by mouth Daily., Disp: , Rfl:   •  SUMAtriptan (IMITREX) 50 MG tablet, Take 50 mg by mouth., Disp: , Rfl:   •  TROKENDI  MG capsule sustained-release 24 hr, Take 1 tablet by mouth Daily., Disp: , Rfl:     Past Surgical History:   Procedure Laterality Date   • COLONOSCOPY N/A 4/2/2018    Procedure: COLONOSCOPY;  Surgeon: Van Romano DO;  Location: [facility] ENDOSCOPY;  Service: Gastroenterology   • DIAGNOSTIC LAPAROSCOPY  04/08/2008    Laparosc diagnostic (2)      • ENDOSCOPY  03/26/2013    Colon endoscopy 90837 (2)      • ENDOSCOPY N/A 4/2/2018    Procedure: ESOPHAGOGASTRODUODENOSCOPY;  Surgeon: Van Romano DO;  Location: [facility] ENDOSCOPY;  Service: Gastroenterology   • ENDOSCOPY W/ PEG TUBE PLACEMENT  03/26/2013    EGD w/ tube 81658 (2)      • EXCISION LESION  01/30/2013    EXC TR-EXT Benign+Irina 0.6-1 CM 88765 (1)      • HAND TENOLYSIS  07/23/1992    Hand/finger surgery (1)      • HEAD & NECK SKIN LESION EXCISIONAL BIOPSY  01/31/1994    Biopsy of Skin 01004 (2)      • INCISION AND DRAINAGE BREAST ABSCESS  12/03/2012    Anesth, surgery of breast (1)      • INJECTION OF MEDICATION  09/12/2011    B12 (1)      • INJECTION OF MEDICATION  04/28/2015    Celestone (betamethasone) (1)     • INJECTION OF MEDICATION  08/17/2015    Depo Medrol (Methylprednisone) (6)      • INJECTION OF MEDICATION  06/08/2012    Kenalog (3)      • INJECTION OF MEDICATION  06/08/2012    Toradol (1)      • NASOLACRIMAL DUCT PROBING  08/16/1973    Probe nasolacrimal duct (1)      • NEPHROSTOMY TRACT DILATATION W/ LITHOTRIPSY     • PAP SMEAR   "03/20/2008    PAP SMEAR (1)      • TOTAL ABDOMINAL HYSTERECTOMY WITH SALPINGO OOPHORECTOMY  04/09/2012    ERLIN/BSO (1)          Family History   Problem Relation Age of Onset   • Cancer Other         other   • Colon cancer Other         Colorectal Cancer   • Diabetes Other    • Heart disease Other    • Hypertension Other    • Lung cancer Other    • Hypertension Mother    • Cancer Sister    • Heart attack Sister    • Heart disease Sister    • Diabetes Brother         Social History     Socioeconomic History   • Marital status:      Spouse name: Not on file   • Number of children: Not on file   • Years of education: Not on file   • Highest education level: Not on file   Social Needs   • Financial resource strain: Not on file   • Food insecurity - worry: Not on file   • Food insecurity - inability: Not on file   • Transportation needs - medical: Not on file   • Transportation needs - non-medical: Not on file   Occupational History   • Not on file   Tobacco Use   • Smoking status: Current Every Day Smoker     Packs/day: 1.00     Types: Cigarettes   • Smokeless tobacco: Never Used   Substance and Sexual Activity   • Alcohol use: No     Frequency: Never   • Drug use: No   • Sexual activity: Not on file   Other Topics Concern   • Not on file   Social History Narrative   • Not on file        Review of Systems   Constitutional: Positive for unexpected weight change.   Eyes: Negative.         Dry eyes   Respiratory: Negative.    Cardiovascular: Negative.    Gastrointestinal: Negative.    Endocrine: Negative.    Genitourinary: Negative.    Musculoskeletal: Positive for arthralgias and myalgias.        Right shoulder pain.    Skin: Negative.    Allergic/Immunologic: Negative.    Neurological: Positive for headaches.   Hematological: Negative.    Psychiatric/Behavioral: Positive for sleep disturbance.   All other systems reviewed and are negative.      PHYSICAL EXAMINATION:       Ht 160 cm (63\")   Wt 58.1 kg (128 lb)   " BMI 22.67 kg/m²     Physical Exam   Constitutional: She is oriented to person, place, and time. Vital signs are normal. She appears well-developed and well-nourished. She is active and cooperative. She does not appear ill. No distress.   HENT:   Head: Normocephalic.   Pulmonary/Chest: Effort normal. No respiratory distress.   Abdominal: Soft. She exhibits no distension.   Musculoskeletal: She exhibits tenderness (Right shoulder, Right trapezius). She exhibits no edema or deformity.   Neurological: She is alert and oriented to person, place, and time. GCS eye subscore is 4. GCS verbal subscore is 5. GCS motor subscore is 6.   Skin: Skin is warm, dry and intact. Capillary refill takes less than 2 seconds. No erythema.   Psychiatric: She has a normal mood and affect. Her speech is normal and behavior is normal. Judgment and thought content normal. Cognition and memory are normal.   Vitals reviewed.      GAIT:     [x]  Normal  []  Antalgic    Assistive device: [x]  None  []  Walker     []  Crutches  []  Cane     []  Wheelchair  []  Stretcher    Right Shoulder Exam     Tenderness   Right shoulder tenderness location: Diffuse, including right trapezius muscle.    Range of Motion   Active abduction: 120   Passive abduction: 160   Extension: 50   External rotation: 70   Forward flexion: 140   Internal rotation 90 degrees: 80     Muscle Strength   Abduction: 4/5   Supraspinatus: 4/5     Tests   Jackson test: positive (Mild)  Cross arm: positive  Impingement: positive (Mild)  Drop arm: negative    Other   Erythema: absent  Sensation: normal  Pulse: present    Comments:  Mild pain and limitations with range of motion. Mild impingement sign noted. Tenderness to palpation diffusely to the musculature of the shoulder, including the trapezius muscle. Stable joint exam.       Left Shoulder Exam     Tenderness   The patient is experiencing no tenderness.     Range of Motion   The patient has normal left shoulder ROM.    Tests    Jackson test: negative  Cross arm: negative  Impingement: negative  Drop arm: negative    Other   Erythema: absent  Sensation: normal  Pulse: present             Xr Spine Cervical 2 Or 3 View    Result Date: 1/31/2019  Narrative: AP and lateral views of the cervical spine reveal no evidence of acute fracture or subluxation.  The cervical spine is normally aligned.  Vertebral body heights are well-maintained.  Intervertebral disc spacing is well-maintained.  No acute bony radiologic abnormalities are noted at this time.  No significant changes are noted when compared with prior images from 7/19/2008.01/31/19 at 12:03 PM by ALISSON Kimble     Xr Shoulder 2+ View Right    Result Date: 1/31/2019  Narrative: 3 views of the right shoulder reveal no evidence of acute fracture or dislocation.  Mild degenerative changes are present.  The humeral head is round and well-positioned within the glenoid.  Glenohumeral joint spacing is maintained.  Soft tissues appear unremarkable.  The visualized lung field is clear.  No acute bony radiologic abnormalities are noted at this time.  No comparison images are available for review.01/31/19 at 12:00 PM by ALISSON Kimble     ASSESSMENT:    Diagnoses and all orders for this visit:    Acute pain of right shoulder  -     Large Joint Arthrocentesis: R subacromial bursa    Rotator cuff syndrome of right shoulder    Impingement syndrome of right shoulder    Neck pain    Large Joint Arthrocentesis: R subacromial bursa  Date/Time: 1/29/2019 1:34 PM  Consent given by: patient  Site marked: site marked  Timeout: Immediately prior to procedure a time out was called to verify the correct patient, procedure, equipment, support staff and site/side marked as required   Supporting Documentation  Indications: pain and joint swelling   Procedure Details  Location: shoulder - R subacromial bursa  Preparation: Patient was prepped and draped in the usual sterile fashion  Needle size: 22  G  Approach: posterior  Medications administered: 2 mL lidocaine PF 1% 1 %; 40 mg triamcinolone acetonide 40 MG/ML  Patient tolerance: patient tolerated the procedure well with no immediate complications      PLAN    X-rays of right shoulder and cervical spine reviewed with no acute findings noted.  Patient complains of right shoulder pain and neck pain for approximately 6 weeks with no known injury.  Patient has tenderness to palpation of the right trapezius muscle more so than the cervical spine.  She does have pain that radiates down the right arm at times, but the pain is not elicited with any motion of her neck.  We discussed right shoulder joint pathology, including impingement, bursitis and/or rotator cuff pathology, versus cervical spine pathology.  We discussed ordering MRIs of the right shoulder and/or cervical spine.  The patient wants to wait on MRIs for now and see if her pain improves with some conservative treatment efforts.  Recommend subacromial injection of the right shoulder today.  We discussed that if this injection improves her pain and symptoms, the source is most likely from the shoulder joint and may warrant further investigation with a MRI of the shoulder.  If the pain does not improve her pain, we may also need to look closer of the cervical spine.  Patient already takes Celebrex daily.  She also takes Norco 7.5 mg tablets for chronic pain as prescribed to her by Dr. Neal, her primary care physician.  Recommend rest and activity modification as tolerated and based on her pain with avoidance of heavy lifting, pulling and/or tugging with use of the right arm.  Recommend ice therapy to the right shoulder intermittently 3-4 times daily for 20 minutes at a time to minimize pain/inflammation.  We also discussed use of heat therapy, especially to the right trapezius muscle for management of pain and muscle spasm.  Follow-up in 4 weeks for recheck.  Follow-up sooner as needed for any new or  worsening symptoms or any concerns.    Return in about 4 weeks (around 2/26/2019) for Recheck.        This document has been electronically signed by ALISSON Kimble on January 31, 2019 1:02 PM      ALISSON Kimble

## 2019-01-31 PROBLEM — M25.511 ACUTE PAIN OF RIGHT SHOULDER: Status: ACTIVE | Noted: 2019-01-31

## 2019-01-31 PROBLEM — M75.101 ROTATOR CUFF SYNDROME OF RIGHT SHOULDER: Status: ACTIVE | Noted: 2019-01-31

## 2019-01-31 PROBLEM — M75.41 IMPINGEMENT SYNDROME OF RIGHT SHOULDER: Status: ACTIVE | Noted: 2019-01-31

## 2019-01-31 RX ORDER — TRIAMCINOLONE ACETONIDE 40 MG/ML
40 INJECTION, SUSPENSION INTRA-ARTICULAR; INTRAMUSCULAR
Status: COMPLETED | OUTPATIENT
Start: 2019-01-29 | End: 2019-01-29

## 2019-01-31 RX ORDER — LIDOCAINE HYDROCHLORIDE 10 MG/ML
2 INJECTION, SOLUTION EPIDURAL; INFILTRATION; INTRACAUDAL; PERINEURAL
Status: COMPLETED | OUTPATIENT
Start: 2019-01-29 | End: 2019-01-29

## 2019-02-04 ENCOUNTER — DOCUMENTATION (OUTPATIENT)
Dept: NUTRITION | Facility: HOSPITAL | Age: 49
End: 2019-02-04

## 2019-02-04 ENCOUNTER — DOCUMENTATION (OUTPATIENT)
Dept: ONCOLOGY | Facility: CLINIC | Age: 49
End: 2019-02-04

## 2019-02-04 ENCOUNTER — CONSULT (OUTPATIENT)
Dept: ONCOLOGY | Facility: CLINIC | Age: 49
End: 2019-02-04

## 2019-02-04 ENCOUNTER — APPOINTMENT (OUTPATIENT)
Dept: ONCOLOGY | Facility: HOSPITAL | Age: 49
End: 2019-02-04

## 2019-02-04 VITALS
SYSTOLIC BLOOD PRESSURE: 135 MMHG | BODY MASS INDEX: 22.39 KG/M2 | WEIGHT: 126.4 LBS | HEART RATE: 74 BPM | DIASTOLIC BLOOD PRESSURE: 86 MMHG | TEMPERATURE: 98 F

## 2019-02-04 DIAGNOSIS — G47.33 OBSTRUCTIVE SLEEP APNEA SYNDROME: ICD-10-CM

## 2019-02-04 DIAGNOSIS — D75.1 ERYTHROCYTOSIS: ICD-10-CM

## 2019-02-04 DIAGNOSIS — D75.1 ERYTHROCYTOSIS: Primary | ICD-10-CM

## 2019-02-04 DIAGNOSIS — E87.6 HYPOKALEMIA: ICD-10-CM

## 2019-02-04 LAB
ANION GAP SERPL CALCULATED.3IONS-SCNC: 12 MMOL/L (ref 5–15)
BASOPHILS # BLD AUTO: 0.02 10*3/MM3 (ref 0–0.2)
BASOPHILS NFR BLD AUTO: 0.2 % (ref 0–2)
BUN BLD-MCNC: 14 MG/DL (ref 7–21)
BUN/CREAT SERPL: 20.9 (ref 7–25)
CALCIUM SPEC-SCNC: 10.1 MG/DL (ref 8.4–10.2)
CHLORIDE SERPL-SCNC: 107 MMOL/L (ref 95–110)
CO2 SERPL-SCNC: 18 MMOL/L (ref 22–31)
CREAT BLD-MCNC: 0.67 MG/DL (ref 0.5–1)
DEPRECATED RDW RBC AUTO: 45.6 FL (ref 36.4–46.3)
EOSINOPHIL # BLD AUTO: 0.08 10*3/MM3 (ref 0–0.7)
EOSINOPHIL NFR BLD AUTO: 0.7 % (ref 0–7)
ERYTHROCYTE [DISTWIDTH] IN BLOOD BY AUTOMATED COUNT: 14 % (ref 11.5–14.5)
GFR SERPL CREATININE-BSD FRML MDRD: 95 ML/MIN/1.73 (ref 58–135)
GLUCOSE BLD-MCNC: 126 MG/DL (ref 60–100)
HCT VFR BLD AUTO: 46.4 % (ref 35–45)
HGB BLD-MCNC: 16.9 G/DL (ref 12–15.5)
IMM GRANULOCYTES # BLD AUTO: 0.05 10*3/MM3 (ref 0–0.02)
IMM GRANULOCYTES NFR BLD AUTO: 0.5 % (ref 0–0.5)
LYMPHOCYTES # BLD AUTO: 3.04 10*3/MM3 (ref 0.6–4.2)
LYMPHOCYTES NFR BLD AUTO: 28.4 % (ref 10–50)
MCH RBC QN AUTO: 32.8 PG (ref 26.5–34)
MCHC RBC AUTO-ENTMCNC: 36.4 G/DL (ref 31.4–36)
MCV RBC AUTO: 89.9 FL (ref 80–98)
MONOCYTES # BLD AUTO: 0.74 10*3/MM3 (ref 0–0.9)
MONOCYTES NFR BLD AUTO: 6.9 % (ref 0–12)
NEUTROPHILS # BLD AUTO: 6.76 10*3/MM3 (ref 2–8.6)
NEUTROPHILS NFR BLD AUTO: 63.3 % (ref 37–80)
PLAT MORPH BLD: NORMAL
PLATELET # BLD AUTO: 259 10*3/MM3 (ref 150–450)
PMV BLD AUTO: 10 FL (ref 8–12)
POTASSIUM BLD-SCNC: 3.3 MMOL/L (ref 3.5–5.1)
RBC # BLD AUTO: 5.16 10*6/MM3 (ref 3.77–5.16)
RBC MORPH BLD: NORMAL
SODIUM BLD-SCNC: 137 MMOL/L (ref 137–145)
WBC MORPH BLD: NORMAL
WBC NRBC COR # BLD: 10.69 10*3/MM3 (ref 3.2–9.8)

## 2019-02-04 PROCEDURE — 82668 ASSAY OF ERYTHROPOIETIN: CPT | Performed by: INTERNAL MEDICINE

## 2019-02-04 PROCEDURE — G0463 HOSPITAL OUTPT CLINIC VISIT: HCPCS | Performed by: INTERNAL MEDICINE

## 2019-02-04 PROCEDURE — 85007 BL SMEAR W/DIFF WBC COUNT: CPT | Performed by: INTERNAL MEDICINE

## 2019-02-04 PROCEDURE — 99204 OFFICE O/P NEW MOD 45 MIN: CPT | Performed by: INTERNAL MEDICINE

## 2019-02-04 PROCEDURE — 80048 BASIC METABOLIC PNL TOTAL CA: CPT | Performed by: INTERNAL MEDICINE

## 2019-02-04 PROCEDURE — 99406 BEHAV CHNG SMOKING 3-10 MIN: CPT | Performed by: INTERNAL MEDICINE

## 2019-02-04 PROCEDURE — 85025 COMPLETE CBC W/AUTO DIFF WBC: CPT | Performed by: INTERNAL MEDICINE

## 2019-02-04 RX ORDER — POTASSIUM CHLORIDE 20 MEQ/1
20 TABLET, EXTENDED RELEASE ORAL DAILY
Qty: 2 TABLET | Refills: 0 | Status: SHIPPED | OUTPATIENT
Start: 2019-02-04 | End: 2019-02-06
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
## 2019-02-04 NOTE — PROGRESS NOTES
"Adult Outpatient Nutrition  Assessment    Patient Name:  Vielka Pratt  YOB: 1970  MRN: [id number]    Assessment Date:  2/4/2019    Comments: Pleasant 48WF coming to [facility] due to erythrocytosis. Ht 63 in. Wt 126.4 lb. Usual weight \"170 lb\".  Weight loss intentional in the beginning, but kept losing when tried to stop. Feels food allergies contributing to weight loss. After tick bite allergic to beef/pork/lamb (alpha-gal panel confirmed). Was also told to avoid milk/dairy.  Did confirm this in allergy screen. Pt said can eats eggs (although screen indicates allergic to egg whites). Appetite is good. Has regular feedings. Discussed supplement options--to try soy or almond milk. Also looking at Boost & Ensure (made with soy, but contain milk protein concentrate). Recipes for dairy free frozen fruit shakes provided. Noted that pt is on metformin. Pt states is not diabetic.  RDN's name/number provided.            Anthropometrics     Row Name 02/04/19 1400          Anthropometrics    Weight  57.3 kg (126 lb 6.4 oz)                       Electronically signed by:  Edelmira Fitzpatrick RD  02/04/19 3:50 PM   "

## 2019-02-05 ENCOUNTER — TELEPHONE (OUTPATIENT)
Dept: ONCOLOGY | Facility: HOSPITAL | Age: 49
End: 2019-02-05

## 2019-02-05 LAB — ETHNIC BACKGROUND STATED: 8.6 MIU/ML (ref 2.6–18.5)

## 2019-02-05 NOTE — TELEPHONE ENCOUNTER
----- Message from Jamie Connor MD sent at 2/4/2019  4:31 PM CST -----  Please let patient know, her potassium was low.  I have sent prescription for K Dur to her pharmacy.  Thank you

## 2019-02-11 NOTE — PROGRESS NOTES
LCSW met with patient prior to her departure for assessment of needs, coping and support. Pt. Presents for hematology consult and expressed significant psychosocial distress and symptoms consistent with depression upon initial  rooming assessment.   SW introduced self and explained role and support.  Individual time spent with patient offering emotional support and feedback as pt shared personal stressors to include family and relationship conflict.  Pt. Indicates she is currently prescribed antidepressant and describes her depression as situational. Pt. Voiced benefit from ability to discuss and process and was encouraged to contact undersigned should she wish further support.  Referral offered for ongoing services with pt indicating adequate support at this time.  Contact info  provided with pt verbalizing understanding and willingness to call should need arise.

## 2019-02-13 ENCOUNTER — OFFICE VISIT (OUTPATIENT)
Dept: ONCOLOGY | Facility: CLINIC | Age: 49
End: 2019-02-13

## 2019-02-13 VITALS
TEMPERATURE: 97.2 F | HEART RATE: 67 BPM | WEIGHT: 123.4 LBS | BODY MASS INDEX: 21.86 KG/M2 | SYSTOLIC BLOOD PRESSURE: 126 MMHG | RESPIRATION RATE: 18 BRPM | DIASTOLIC BLOOD PRESSURE: 86 MMHG | HEIGHT: 63 IN | OXYGEN SATURATION: 99 %

## 2019-02-13 DIAGNOSIS — D75.1 ERYTHROCYTOSIS: Primary | ICD-10-CM

## 2019-02-13 PROCEDURE — G0463 HOSPITAL OUTPT CLINIC VISIT: HCPCS | Performed by: INTERNAL MEDICINE

## 2019-02-13 PROCEDURE — G8420 CALC BMI NORM PARAMETERS: HCPCS | Performed by: INTERNAL MEDICINE

## 2019-02-13 PROCEDURE — 99214 OFFICE O/P EST MOD 30 MIN: CPT | Performed by: INTERNAL MEDICINE
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
## 2019-04-15 DIAGNOSIS — M25.512 LEFT SHOULDER PAIN, UNSPECIFIED CHRONICITY: Primary | ICD-10-CM

## 2019-04-17 ENCOUNTER — APPOINTMENT (OUTPATIENT)
Dept: ONCOLOGY | Facility: HOSPITAL | Age: 49
End: 2019-04-17

## 2019-04-17 ENCOUNTER — APPOINTMENT (OUTPATIENT)
Dept: ONCOLOGY | Facility: CLINIC | Age: 49
End: 2019-04-17

## 2020-02-06 ENCOUNTER — OFFICE VISIT (OUTPATIENT)
Dept: PULMONOLOGY | Facility: CLINIC | Age: 50
End: 2020-02-06

## 2020-02-06 VITALS
HEIGHT: 63 IN | SYSTOLIC BLOOD PRESSURE: 128 MMHG | DIASTOLIC BLOOD PRESSURE: 76 MMHG | BODY MASS INDEX: 23.21 KG/M2 | OXYGEN SATURATION: 98 % | HEART RATE: 76 BPM | WEIGHT: 131 LBS

## 2020-02-06 DIAGNOSIS — J45.21 MILD INTERMITTENT ASTHMA WITH ACUTE EXACERBATION: Primary | ICD-10-CM

## 2020-02-06 PROCEDURE — 99214 OFFICE O/P EST MOD 30 MIN: CPT | Performed by: INTERNAL MEDICINE

## 2020-02-06 PROCEDURE — 96372 THER/PROPH/DIAG INJ SC/IM: CPT | Performed by: INTERNAL MEDICINE

## 2020-02-06 RX ORDER — METHYLPREDNISOLONE ACETATE 80 MG/ML
80 INJECTION, SUSPENSION INTRA-ARTICULAR; INTRALESIONAL; INTRAMUSCULAR; SOFT TISSUE ONCE
Status: SHIPPED | OUTPATIENT
Start: 2020-02-06

## 2020-02-06 NOTE — PROGRESS NOTES
"This lady has mild persistent asthma.  She developed a sore throat recently and has developed a cough sputum production and shortness of breath.  She started her new cell phone and antibiotic.    The following portions of the patient's history were reviewed and updated as appropriate: current medications, past family history, past medical history, past social history, past surgical history and problem list.    ROS    Constitutional-no night sweats weight loss headaches  GI no abdominal pain nausea or diarrhea  Neuro no seizure or neurologic deficits  Musculoskeletal no deformity or joint pain   no dysuria or hematuria  Skin no rash or other lesions  All other systems reviewed and were negative except for the above.        Physical Exam  /76   Pulse 76   Ht 160 cm (63\")   Wt 59.4 kg (131 lb)   SpO2 98%   BMI 23.21 kg/m²   Vital signs as above  Pupils equally round and reactive to light and accommodation, neck no JVD or adenopathy.  Cardiovascular regular rhythm and rate no murmur or gallop.  Abdomen soft no organomegaly tenderness.  Extremities no clubbing cyanosis or edema.  No cervical adenopathy.  No skin rash.  Neurologic good strength bilaterally without deficits  Dyspneic white female with rhonchi and wheezes    Impression mild persistent asthma with exacerbation secondary to acute bronchitis    Plan continue antibiotic, Depo-Medrol, Dulera inhaler, return in 2 weeks        This document has been produced with the assistance of Dragon dictation  This document has been electronically signed by Percy Sanders MD on February 6, 2020 10:02 AM      "

## 2020-02-10 ENCOUNTER — OFFICE VISIT (OUTPATIENT)
Dept: PULMONOLOGY | Facility: CLINIC | Age: 50
End: 2020-02-10

## 2020-02-10 VITALS
HEART RATE: 64 BPM | WEIGHT: 129.6 LBS | HEIGHT: 63 IN | BODY MASS INDEX: 22.96 KG/M2 | OXYGEN SATURATION: 99 % | SYSTOLIC BLOOD PRESSURE: 140 MMHG | DIASTOLIC BLOOD PRESSURE: 72 MMHG

## 2020-02-10 DIAGNOSIS — J45.31 MILD PERSISTENT ASTHMA WITH ACUTE EXACERBATION: ICD-10-CM

## 2020-02-10 DIAGNOSIS — H92.03 OTALGIA OF BOTH EARS: Primary | ICD-10-CM

## 2020-02-10 PROCEDURE — 99213 OFFICE O/P EST LOW 20 MIN: CPT | Performed by: INTERNAL MEDICINE

## 2020-02-10 RX ORDER — SUMATRIPTAN 50 MG/1
50 TABLET, FILM COATED ORAL
COMMUNITY
Start: 2019-02-14 | End: 2020-06-03

## 2020-02-10 RX ORDER — DICYCLOMINE HCL 20 MG
TABLET ORAL
COMMUNITY
Start: 2017-08-08 | End: 2020-06-03

## 2020-02-10 RX ORDER — ESTRADIOL 0.1 MG/G
CREAM VAGINAL
COMMUNITY
Start: 2019-04-30

## 2020-02-10 RX ORDER — NITROGLYCERIN 0.4 MG/1
0.4 TABLET SUBLINGUAL
COMMUNITY
Start: 2018-08-14 | End: 2021-08-09

## 2020-02-10 RX ORDER — ESCITALOPRAM OXALATE 20 MG/1
TABLET ORAL
COMMUNITY
Start: 2019-02-21

## 2020-02-10 RX ORDER — HYDROXYZINE PAMOATE 25 MG/1
25 CAPSULE ORAL
COMMUNITY
Start: 2019-01-10 | End: 2023-03-06

## 2020-02-10 RX ORDER — FLUCONAZOLE 150 MG/1
TABLET ORAL
COMMUNITY
Start: 2019-12-03 | End: 2021-05-19

## 2020-02-10 RX ORDER — ESTRADIOL 0.1 MG/G
CREAM VAGINAL
COMMUNITY
Start: 2019-12-26 | End: 2020-06-03

## 2020-02-10 RX ORDER — PROPYLTHIOURACIL 50 MG/1
50 TABLET ORAL 3 TIMES DAILY
COMMUNITY
Start: 2018-10-25

## 2020-02-10 RX ORDER — METHYLPREDNISOLONE 4 MG/1
TABLET ORAL
COMMUNITY
Start: 2019-12-03 | End: 2021-05-19

## 2020-02-10 RX ORDER — CARVEDILOL 12.5 MG/1
TABLET ORAL
COMMUNITY
Start: 2019-08-19 | End: 2020-06-03

## 2020-02-10 RX ORDER — PANTOPRAZOLE SODIUM 40 MG/1
TABLET, DELAYED RELEASE ORAL
COMMUNITY
Start: 2019-07-22

## 2020-02-10 RX ORDER — QUETIAPINE FUMARATE 100 MG/1
TABLET, FILM COATED ORAL
COMMUNITY
Start: 2019-12-24 | End: 2021-08-09

## 2020-02-10 RX ORDER — EPINEPHRINE 0.3 MG/.3ML
0.3 INJECTION SUBCUTANEOUS
COMMUNITY
Start: 2018-08-24

## 2020-02-10 RX ORDER — FLUTICASONE PROPIONATE 50 MCG
1 SPRAY, SUSPENSION (ML) NASAL DAILY
COMMUNITY
Start: 2019-09-10 | End: 2020-09-10

## 2020-02-10 RX ORDER — GUAIFENESIN 600 MG/1
600 TABLET, EXTENDED RELEASE ORAL
COMMUNITY
Start: 2019-12-03

## 2020-02-10 RX ORDER — SPIRONOLACTONE 25 MG/1
25 TABLET ORAL
COMMUNITY
Start: 2019-06-20 | End: 2020-06-03

## 2020-02-10 RX ORDER — MONTELUKAST SODIUM 10 MG/1
TABLET ORAL
COMMUNITY
Start: 2019-04-11 | End: 2020-06-03

## 2020-02-10 RX ORDER — AZITHROMYCIN 250 MG/1
TABLET, FILM COATED ORAL
Qty: 6 TABLET | Refills: 1 | Status: SHIPPED | OUTPATIENT
Start: 2020-02-10 | End: 2021-05-17

## 2020-02-10 RX ORDER — ONDANSETRON 4 MG/1
TABLET, ORALLY DISINTEGRATING ORAL
COMMUNITY
Start: 2020-01-03 | End: 2020-06-03

## 2020-02-10 RX ORDER — KETOCONAZOLE 20 MG/ML
1 SHAMPOO TOPICAL
COMMUNITY
End: 2021-05-19

## 2020-02-10 RX ORDER — LEVOFLOXACIN 500 MG/1
TABLET, FILM COATED ORAL
COMMUNITY
Start: 2019-12-03 | End: 2021-05-19

## 2020-02-10 RX ORDER — OXYBUTYNIN CHLORIDE 5 MG/1
TABLET ORAL
COMMUNITY
Start: 2019-07-22 | End: 2021-05-19

## 2020-02-10 RX ORDER — SPIRONOLACTONE 25 MG/1
TABLET ORAL
COMMUNITY
Start: 2020-01-23 | End: 2021-05-19

## 2020-02-10 RX ORDER — CELECOXIB 200 MG/1
CAPSULE ORAL
COMMUNITY
Start: 2020-01-20 | End: 2020-06-03

## 2020-02-10 RX ORDER — PREDNISONE 20 MG/1
20 TABLET ORAL DAILY
Qty: 30 TABLET | Refills: 0 | Status: SHIPPED | OUTPATIENT
Start: 2020-02-10 | End: 2021-05-19

## 2020-02-10 RX ORDER — FLUOROMETHOLONE 0.1 %
SUSPENSION, DROPS(FINAL DOSAGE FORM)(ML) OPHTHALMIC (EYE)
COMMUNITY
Start: 2020-01-18

## 2020-02-10 RX ORDER — HYDROCODONE BITARTRATE AND ACETAMINOPHEN 10; 325 MG/1; MG/1
1 TABLET ORAL
COMMUNITY
Start: 2020-01-29 | End: 2020-06-03

## 2020-02-10 RX ORDER — RANITIDINE 150 MG/1
TABLET ORAL
COMMUNITY
Start: 2019-07-22 | End: 2021-08-09

## 2020-02-10 NOTE — PROGRESS NOTES
"This lady had a recent asthma exacerbation that is not back to baseline.  She still short of breath.  She is not producing any purulent sputum and continues to take bronchodilators    ROS    Constitutional-no night sweats weight loss headaches  GI no abdominal pain nausea or diarrhea  Neuro no seizure or neurologic deficits  Musculoskeletal no deformity or joint pain   no dysuria or hematuria  Skin no rash or other lesions  All other systems reviewed and were negative except for the above.      Physical Exam  /72   Pulse 64   Ht 160 cm (63\")   Wt 58.8 kg (129 lb 9.6 oz)   SpO2 99%   BMI 22.96 kg/m²   Vital signs as above  Pupils equally round and reactive to light and accommodation, neck no JVD or adenopathy.  Cardiovascular regular rhythm and rate no murmur or gallop.  Abdomen soft no organomegaly tenderness.  Extremities no clubbing cyanosis or edema.  No cervical adenopathy.  No skin rash.  Neurologic good strength bilaterally without deficits  Alert afebrile ears reveal serous otitis, lungs diminished breath sounds with rhonchi    Asthma exacerbation    Plan continue present medications, will take prednisone 20 mg a day to be tapered when her exacerbation improves, return in 2 weeks        This document has been produced with the assistance of Tera dictation  This document has been electronically signed by Percy Sanders MD on February 10, 2020 10:14 AM      "

## 2020-02-21 ENCOUNTER — TELEPHONE (OUTPATIENT)
Dept: PULMONOLOGY | Facility: CLINIC | Age: 50
End: 2020-02-21

## 2020-02-21 RX ORDER — NEOMYCIN SULFATE, POLYMYXIN B SULFATE, HYDROCORTISONE 3.5; 10000; 1 MG/ML; [USP'U]/ML; MG/ML
4 SOLUTION/ DROPS AURICULAR (OTIC) 4 TIMES DAILY
Status: SHIPPED | OUTPATIENT
Start: 2020-02-21 | End: 2020-02-26

## 2020-02-21 NOTE — TELEPHONE ENCOUNTER
----- Message from Yisel Galdamez MA sent at 2020  1:50 PM CST -----  Regarding: RE: REFILL  Contact: [phone]      ----- Message -----  From: Janae Cuba  Sent: 2020   1:38 PM CST  To: Yisel Galdamez MA  Subject: REFILL                                           RAMBO COTTER  74 CALLED AND STATED HER EAR DROPS CORTISPORIN IS NOT AT HER PHARMACY. SHE IS AT THE PHARMACY WAITING FOR HER PRESCRIPTION. THXS

## 2020-02-21 NOTE — TELEPHONE ENCOUNTER
Called [facility] and gave them a verbal because they didn't receive the script sent in earlier today. I spoke with the pharmacist David.    Called pt and apozogied for the inconvenience and explained that the pharmacy is getting it ready for her now.

## 2020-03-10 RX ORDER — METHYLPREDNISOLONE ACETATE 40 MG/ML
80 INJECTION, SUSPENSION INTRA-ARTICULAR; INTRALESIONAL; INTRAMUSCULAR; SOFT TISSUE ONCE
Status: COMPLETED | OUTPATIENT
Start: 2020-03-10 | End: 2020-03-10

## 2020-03-10 RX ADMIN — METHYLPREDNISOLONE ACETATE 80 MG: 40 INJECTION, SUSPENSION INTRA-ARTICULAR; INTRALESIONAL; INTRAMUSCULAR; SOFT TISSUE at 14:57

## 2020-03-17 ENCOUNTER — CLINICAL SUPPORT (OUTPATIENT)
Dept: FAMILY MEDICINE CLINIC | Facility: CLINIC | Age: 50
End: 2020-03-17

## 2020-03-17 DIAGNOSIS — Z11.1 SCREENING FOR TUBERCULOSIS: Primary | ICD-10-CM

## 2020-03-17 PROCEDURE — 86580 TB INTRADERMAL TEST: CPT | Performed by: FAMILY MEDICINE

## 2020-06-02 ENCOUNTER — TRANSCRIBE ORDERS (OUTPATIENT)
Dept: PHYSICAL THERAPY | Facility: CLINIC | Age: 50
End: 2020-06-02

## 2020-06-02 DIAGNOSIS — M54.2 NECK PAIN: Primary | ICD-10-CM

## 2020-06-03 ENCOUNTER — OFFICE VISIT (OUTPATIENT)
Dept: OTOLARYNGOLOGY | Facility: CLINIC | Age: 50
End: 2020-06-03

## 2020-06-03 VITALS — BODY MASS INDEX: 22.47 KG/M2 | HEIGHT: 64 IN | WEIGHT: 131.6 LBS | TEMPERATURE: 97.9 F | OXYGEN SATURATION: 98 %

## 2020-06-03 DIAGNOSIS — E04.1 THYROID NODULE: Primary | ICD-10-CM

## 2020-06-03 PROCEDURE — 99214 OFFICE O/P EST MOD 30 MIN: CPT | Performed by: OTOLARYNGOLOGY

## 2020-06-03 RX ORDER — NORTRIPTYLINE HYDROCHLORIDE 10 MG/1
CAPSULE ORAL
COMMUNITY
Start: 2020-05-28

## 2020-06-03 NOTE — PROGRESS NOTES
"Subjective   Vielka Stormy Pratt is a 50 y.o. female.       History of Present Illness   Patient is known to me from excision of a cutaneous cyst back in December 2017.  Has not been seen by me since that time.  Returns today stating that she had a MRI scan of her spine and there was an incidental finding of a thyroid nodule.  This was done at [facility].  The disc is available as well as the report and both are personally reviewed.  Patient reports no dysphasia and no family history of thyroid cancer.  She is a smoker but denies any acute voice change.  Had a \"allergic reaction\" this past Friday and states her tongue still feels like it has a \"rash\" on it.      The following portions of the patient's history were reviewed and updated as appropriate: allergies, current medications, past family history, past medical history, past social history, past surgical history and problem list.     reports that she has been smoking cigarettes. She has been smoking about 1.00 pack per day. She has never used smokeless tobacco. She reports that she does not drink alcohol or use drugs.   Patient is a tobacco user and has been counseled for use of tobacco products      Review of Systems   Constitutional: Positive for unexpected weight change.   Musculoskeletal: Positive for arthralgias.   Psychiatric/Behavioral:        Not assessed   All other systems reviewed and are negative.          Objective   Physical Exam  General: Well-developed well-nourished female in no acute distress.  Alert and oriented x-3.  Voice: Somewhat deep but not harsher breathy. Speech:Fluent  Ears: External ears no deformity, canals no discharge, tympanic membranes intact clear and mobile bilaterally.  Nose: Nares show no discharge mass polyp or purulence.  Boggy mucosa is present.  No gross external deformity.  Septum: Midline  Oral cavity: Lips and gums without lesions.  Tongue and floor of mouth without lesions.  Tongue is morphologically normal " with no mucosal abnormality.  Parotid and submandibular ducts unobstructed.  No mucosal lesions on the buccal mucosa or vestibule of the mouth.  Pharynx: No erythema exudate mass or ulcer  Neck: No lymphadenopathy.  No thyromegaly, specifically the thyroid is nonpalpable.  Trachea and larynx midline.  No masses in the parotid or submandibular glands.      Assessment/Plan   Vielka was seen today for sore throat.    Diagnoses and all orders for this visit:    Thyroid nodule        Plan: Explained to the patient that a thyroid ultrasound would be necessary to further characterize the nodule and depending on findings other recommendations could be made either observation with serial ultrasound or fine-needle aspiration biopsy.  Patient will be contacted by telephone once the results are available and a treatment plan will be devised at that point.

## 2020-06-04 ENCOUNTER — TREATMENT (OUTPATIENT)
Dept: PHYSICAL THERAPY | Facility: CLINIC | Age: 50
End: 2020-06-04
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
## 2020-06-04 DIAGNOSIS — M54.2 CERVICALGIA: Primary | ICD-10-CM

## 2020-06-04 DIAGNOSIS — S16.1XXD STRAIN OF NECK MUSCLE, SUBSEQUENT ENCOUNTER: ICD-10-CM

## 2020-06-04 PROCEDURE — 97161 PT EVAL LOW COMPLEX 20 MIN: CPT | Performed by: PHYSICAL THERAPIST

## 2020-06-04 PROCEDURE — 97110 THERAPEUTIC EXERCISES: CPT | Performed by: PHYSICAL THERAPIST

## 2020-06-04 PROCEDURE — 97140 MANUAL THERAPY 1/> REGIONS: CPT | Performed by: PHYSICAL THERAPIST
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
## 2020-06-05 ENCOUNTER — HOSPITAL ENCOUNTER (OUTPATIENT)
Dept: ULTRASOUND IMAGING | Facility: HOSPITAL | Age: 50
Discharge: HOME | End: 2020-06-05
Admitting: OTOLARYNGOLOGY

## 2020-06-05 DIAGNOSIS — E04.1 THYROID NODULE: ICD-10-CM

## 2020-06-05 PROCEDURE — 76536 US EXAM OF HEAD AND NECK: CPT

## 2020-06-08 ENCOUNTER — TREATMENT (OUTPATIENT)
Dept: PHYSICAL THERAPY | Facility: CLINIC | Age: 50
End: 2020-06-08

## 2020-06-08 DIAGNOSIS — M54.2 CERVICALGIA: Primary | ICD-10-CM

## 2020-06-08 DIAGNOSIS — S16.1XXD STRAIN OF NECK MUSCLE, SUBSEQUENT ENCOUNTER: ICD-10-CM
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
## 2020-06-08 PROCEDURE — 97110 THERAPEUTIC EXERCISES: CPT | Performed by: PHYSICAL THERAPIST

## 2020-06-08 PROCEDURE — 97140 MANUAL THERAPY 1/> REGIONS: CPT | Performed by: PHYSICAL THERAPIST
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
## 2020-06-11 ENCOUNTER — TREATMENT (OUTPATIENT)
Dept: PHYSICAL THERAPY | Facility: CLINIC | Age: 50
End: 2020-06-11

## 2020-06-11 DIAGNOSIS — S16.1XXD STRAIN OF NECK MUSCLE, SUBSEQUENT ENCOUNTER: ICD-10-CM
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
## 2020-06-11 DIAGNOSIS — M54.2 CERVICALGIA: Primary | ICD-10-CM

## 2020-06-11 PROCEDURE — 97014 ELECTRIC STIMULATION THERAPY: CPT | Performed by: PHYSICAL THERAPIST

## 2020-06-11 PROCEDURE — 97140 MANUAL THERAPY 1/> REGIONS: CPT | Performed by: PHYSICAL THERAPIST

## 2020-06-11 PROCEDURE — 97110 THERAPEUTIC EXERCISES: CPT | Performed by: PHYSICAL THERAPIST
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
## 2020-06-15 ENCOUNTER — TREATMENT (OUTPATIENT)
Dept: PHYSICAL THERAPY | Facility: CLINIC | Age: 50
End: 2020-06-15

## 2020-06-15 DIAGNOSIS — S16.1XXD STRAIN OF NECK MUSCLE, SUBSEQUENT ENCOUNTER: ICD-10-CM

## 2020-06-15 DIAGNOSIS — M54.2 CERVICALGIA: Primary | ICD-10-CM
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
## 2020-06-15 PROCEDURE — 97140 MANUAL THERAPY 1/> REGIONS: CPT | Performed by: PHYSICAL THERAPIST

## 2020-06-15 PROCEDURE — 97014 ELECTRIC STIMULATION THERAPY: CPT | Performed by: PHYSICAL THERAPIST

## 2020-06-15 PROCEDURE — 97110 THERAPEUTIC EXERCISES: CPT | Performed by: PHYSICAL THERAPIST
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
## 2020-06-16 DIAGNOSIS — D44.0 NEOPLASM OF UNCERTAIN BEHAVIOR OF THYROID GLAND: Primary | ICD-10-CM

## 2020-06-18 ENCOUNTER — TREATMENT (OUTPATIENT)
Dept: PHYSICAL THERAPY | Facility: CLINIC | Age: 50
End: 2020-06-18

## 2020-06-18 DIAGNOSIS — M54.2 CERVICALGIA: Primary | ICD-10-CM

## 2020-06-18 DIAGNOSIS — S16.1XXD STRAIN OF NECK MUSCLE, SUBSEQUENT ENCOUNTER: ICD-10-CM
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
## 2020-06-18 PROCEDURE — 97140 MANUAL THERAPY 1/> REGIONS: CPT | Performed by: PHYSICAL THERAPIST

## 2020-06-18 PROCEDURE — 97014 ELECTRIC STIMULATION THERAPY: CPT | Performed by: PHYSICAL THERAPIST

## 2020-06-18 PROCEDURE — 97110 THERAPEUTIC EXERCISES: CPT | Performed by: PHYSICAL THERAPIST
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
## 2020-06-25 ENCOUNTER — TREATMENT (OUTPATIENT)
Dept: PHYSICAL THERAPY | Facility: CLINIC | Age: 50
End: 2020-06-25

## 2020-06-25 DIAGNOSIS — S16.1XXD STRAIN OF NECK MUSCLE, SUBSEQUENT ENCOUNTER: ICD-10-CM
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
## 2020-06-25 DIAGNOSIS — M54.2 CERVICALGIA: Primary | ICD-10-CM

## 2020-06-25 PROCEDURE — 97110 THERAPEUTIC EXERCISES: CPT | Performed by: PHYSICAL THERAPIST

## 2020-06-25 PROCEDURE — 97014 ELECTRIC STIMULATION THERAPY: CPT | Performed by: PHYSICAL THERAPIST

## 2020-06-25 PROCEDURE — 97140 MANUAL THERAPY 1/> REGIONS: CPT | Performed by: PHYSICAL THERAPIST
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
## 2020-06-26 ENCOUNTER — HOSPITAL ENCOUNTER (OUTPATIENT)
Dept: ULTRASOUND IMAGING | Facility: HOSPITAL | Age: 50
Discharge: HOME | End: 2020-06-26
Admitting: RADIOLOGY

## 2020-06-26 ENCOUNTER — DOCUMENTATION (OUTPATIENT)
Dept: OTOLARYNGOLOGY | Facility: CLINIC | Age: 50
End: 2020-06-26

## 2020-06-26 VITALS
TEMPERATURE: 97 F | RESPIRATION RATE: 18 BRPM | DIASTOLIC BLOOD PRESSURE: 78 MMHG | OXYGEN SATURATION: 98 % | HEART RATE: 68 BPM | SYSTOLIC BLOOD PRESSURE: 120 MMHG

## 2020-06-26 DIAGNOSIS — D44.0 NEOPLASM OF UNCERTAIN BEHAVIOR OF THYROID GLAND: ICD-10-CM

## 2020-06-26 NOTE — PROGRESS NOTES
06/26/2020, 0916 - Ultrasound Fine Needle Aspiration scheduled for completion Friday, June 26, 2020 at 1:15 P.M. at [facility], [place], [place].

## 2020-06-26 NOTE — PROGRESS NOTES
06/26/2020, 0916 - Ultrasound Fine Needle Aspiration scheduled for completion Friday, June 26, 2020 at 1:15 P.M. at [facility], [place], [place].

## 2020-06-26 NOTE — POST-PROCEDURE NOTE
Pre-Op Diagnosis:  Right thyroid nodule  Post-Op Diagnosis: Right thyroid nodule  Procedure: US directed FNA  Anesthesia: local  EBL: none  Specimens:   Specimen sent to LAB / PATH  Findings: Specimen sent, awaiting results  Complications: none  Disposition:  Patient tolerated the procedure well

## 2020-06-26 NOTE — PRE-PROCEDURE NOTE
Patient for US directed thyroid FNA. Procedure, risks , and benefits discussed with patient and patient  gave informed consent.  H&P dated 6/3/2020 reviewed with no changes.

## 2020-06-29 ENCOUNTER — TREATMENT (OUTPATIENT)
Dept: PHYSICAL THERAPY | Facility: CLINIC | Age: 50
End: 2020-06-29

## 2020-06-29 DIAGNOSIS — S16.1XXD STRAIN OF NECK MUSCLE, SUBSEQUENT ENCOUNTER: ICD-10-CM
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
## 2020-06-29 DIAGNOSIS — M54.2 CERVICALGIA: Primary | ICD-10-CM

## 2020-06-29 LAB
LAB AP CASE REPORT: NORMAL
PATH REPORT.FINAL DX SPEC: NORMAL

## 2020-06-29 PROCEDURE — 97014 ELECTRIC STIMULATION THERAPY: CPT | Performed by: PHYSICAL THERAPIST

## 2020-06-29 PROCEDURE — 97140 MANUAL THERAPY 1/> REGIONS: CPT | Performed by: PHYSICAL THERAPIST

## 2020-06-29 PROCEDURE — 97110 THERAPEUTIC EXERCISES: CPT | Performed by: PHYSICAL THERAPIST
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
## 2020-07-01 ENCOUNTER — TREATMENT (OUTPATIENT)
Dept: PHYSICAL THERAPY | Facility: CLINIC | Age: 50
End: 2020-07-01
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
## 2020-07-01 DIAGNOSIS — S16.1XXD STRAIN OF NECK MUSCLE, SUBSEQUENT ENCOUNTER: ICD-10-CM

## 2020-07-01 DIAGNOSIS — M54.2 CERVICALGIA: Primary | ICD-10-CM

## 2020-07-01 PROCEDURE — 97140 MANUAL THERAPY 1/> REGIONS: CPT | Performed by: PHYSICAL THERAPIST

## 2020-07-01 PROCEDURE — 97110 THERAPEUTIC EXERCISES: CPT | Performed by: PHYSICAL THERAPIST
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
## 2020-07-06 ENCOUNTER — TELEPHONE (OUTPATIENT)
Dept: OTOLARYNGOLOGY | Facility: CLINIC | Age: 50
End: 2020-07-06

## 2020-07-06 ENCOUNTER — TREATMENT (OUTPATIENT)
Dept: PHYSICAL THERAPY | Facility: CLINIC | Age: 50
End: 2020-07-06

## 2020-07-06 DIAGNOSIS — S16.1XXD STRAIN OF NECK MUSCLE, SUBSEQUENT ENCOUNTER: ICD-10-CM
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
## 2020-07-06 DIAGNOSIS — M54.2 CERVICALGIA: Primary | ICD-10-CM

## 2020-07-06 PROCEDURE — 97140 MANUAL THERAPY 1/> REGIONS: CPT | Performed by: PHYSICAL THERAPIST

## 2020-07-06 PROCEDURE — 97014 ELECTRIC STIMULATION THERAPY: CPT | Performed by: PHYSICAL THERAPIST

## 2020-07-06 PROCEDURE — 97110 THERAPEUTIC EXERCISES: CPT | Performed by: PHYSICAL THERAPIST
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
## 2020-07-06 NOTE — TELEPHONE ENCOUNTER
----- Message from Maria Victoria Hollis sent at 7/6/2020  9:06 AM CDT -----  Contact: [phone]  Calling or bx results    Called at 4:57 PM.  No answer, voicemail not set up.  Will call again tomorrow.

## 2020-07-07 ENCOUNTER — TELEPHONE (OUTPATIENT)
Dept: OTOLARYNGOLOGY | Facility: CLINIC | Age: 50
End: 2020-07-07

## 2020-07-07 ENCOUNTER — DOCUMENTATION (OUTPATIENT)
Dept: OTOLARYNGOLOGY | Facility: CLINIC | Age: 50
End: 2020-07-07

## 2020-07-07 NOTE — TELEPHONE ENCOUNTER
----- Message from Maria Victoria Hollis sent at 7/6/2020  9:06 AM CDT -----  Contact: [phone]  Calling or bx results    Informed the patient that the FNA cytology result was unfortunately nondiagnostic due to insufficient tissue.  I gave her the option of a repeat needle biopsy versus observation with another ultrasound in 6 months.  She would prefer to have another needle biopsy in hopes of obtaining a more definitive diagnosis and therefore this will be rescheduled and patient will once again be contacted by telephone once results are available.

## 2020-07-07 NOTE — PROGRESS NOTES
Patient's ultrasound-guided fine-needle aspiration of her right thyroid nodule was nondiagnostic due to insufficient amount of cellular material.  I discussed options with the patient including observation with repeat ultrasound in 6 months versus proceeding with repeat fine-needle aspiration biopsy in hopes of obtaining a more definitive result.  The patient prefers to undergo repeat needle aspiration and therefore this will be scheduled with telephone follow-up regarding results and treatment options

## 2020-07-08 DIAGNOSIS — D44.0 NEOPLASM OF UNCERTAIN BEHAVIOR OF THYROID GLAND: Primary | ICD-10-CM

## 2020-07-09 ENCOUNTER — TREATMENT (OUTPATIENT)
Dept: PHYSICAL THERAPY | Facility: CLINIC | Age: 50
End: 2020-07-09

## 2020-07-09 DIAGNOSIS — M54.2 CERVICALGIA: Primary | ICD-10-CM

## 2020-07-09 DIAGNOSIS — S16.1XXD STRAIN OF NECK MUSCLE, SUBSEQUENT ENCOUNTER: ICD-10-CM
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
## 2020-07-09 PROCEDURE — 97014 ELECTRIC STIMULATION THERAPY: CPT | Performed by: PHYSICAL THERAPIST

## 2020-07-09 PROCEDURE — 97110 THERAPEUTIC EXERCISES: CPT | Performed by: PHYSICAL THERAPIST

## 2020-07-09 PROCEDURE — 97140 MANUAL THERAPY 1/> REGIONS: CPT | Performed by: PHYSICAL THERAPIST
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
## 2020-07-13 ENCOUNTER — TREATMENT (OUTPATIENT)
Dept: PHYSICAL THERAPY | Facility: CLINIC | Age: 50
End: 2020-07-13

## 2020-07-13 DIAGNOSIS — M54.2 CERVICALGIA: Primary | ICD-10-CM
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
## 2020-07-13 DIAGNOSIS — S16.1XXD STRAIN OF NECK MUSCLE, SUBSEQUENT ENCOUNTER: ICD-10-CM

## 2020-07-13 PROCEDURE — 97014 ELECTRIC STIMULATION THERAPY: CPT | Performed by: PHYSICAL THERAPIST

## 2020-07-13 PROCEDURE — 97110 THERAPEUTIC EXERCISES: CPT | Performed by: PHYSICAL THERAPIST

## 2020-07-13 PROCEDURE — 97140 MANUAL THERAPY 1/> REGIONS: CPT | Performed by: PHYSICAL THERAPIST
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
## 2020-07-16 ENCOUNTER — TREATMENT (OUTPATIENT)
Dept: PHYSICAL THERAPY | Facility: CLINIC | Age: 50
End: 2020-07-16
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
## 2020-07-16 DIAGNOSIS — S16.1XXD STRAIN OF NECK MUSCLE, SUBSEQUENT ENCOUNTER: ICD-10-CM

## 2020-07-16 DIAGNOSIS — M54.2 CERVICALGIA: Primary | ICD-10-CM

## 2020-07-16 PROCEDURE — 97140 MANUAL THERAPY 1/> REGIONS: CPT | Performed by: PHYSICAL THERAPIST

## 2020-07-16 PROCEDURE — 97110 THERAPEUTIC EXERCISES: CPT | Performed by: PHYSICAL THERAPIST

## 2020-07-16 PROCEDURE — 97014 ELECTRIC STIMULATION THERAPY: CPT | Performed by: PHYSICAL THERAPIST
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
## 2020-07-17 ENCOUNTER — HOSPITAL ENCOUNTER (OUTPATIENT)
Dept: ULTRASOUND IMAGING | Facility: HOSPITAL | Age: 50
Discharge: HOME | End: 2020-07-17
Admitting: RADIOLOGY

## 2020-07-17 VITALS
TEMPERATURE: 98.5 F | OXYGEN SATURATION: 95 % | HEART RATE: 62 BPM | SYSTOLIC BLOOD PRESSURE: 128 MMHG | DIASTOLIC BLOOD PRESSURE: 75 MMHG

## 2020-07-17 DIAGNOSIS — D44.0 NEOPLASM OF UNCERTAIN BEHAVIOR OF THYROID GLAND: ICD-10-CM

## 2020-07-17 NOTE — POST-PROCEDURE NOTE
Pre-Op Diagnosis: Right thyroid nodule  Post-Op Diagnosis: same  Procedure: Ultrasound-guided right thyroid nodule fine-needle aspiration  Anesthesia: 3 mL of 2% local lidocaine  EBL: None  Findings: Three passes were made with 25-gauge needles. Specimens were handed to Dr. Sharif.  Complications: No immediate  Disposition:  Patient tolerated the procedure well

## 2020-07-23 ENCOUNTER — TREATMENT (OUTPATIENT)
Dept: PHYSICAL THERAPY | Facility: CLINIC | Age: 50
End: 2020-07-23

## 2020-07-23 DIAGNOSIS — S16.1XXD STRAIN OF NECK MUSCLE, SUBSEQUENT ENCOUNTER: ICD-10-CM

## 2020-07-23 DIAGNOSIS — M54.2 CERVICALGIA: Primary | ICD-10-CM
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
## 2020-07-23 LAB
LAB AP CASE REPORT: NORMAL
LAB AP FLOW CYTOMETRY REPORT,ADDENDUM: NORMAL
PATH REPORT.FINAL DX SPEC: NORMAL

## 2020-07-23 PROCEDURE — 97035 APP MDLTY 1+ULTRASOUND EA 15: CPT | Performed by: PHYSICAL THERAPIST

## 2020-07-23 PROCEDURE — 97110 THERAPEUTIC EXERCISES: CPT | Performed by: PHYSICAL THERAPIST

## 2020-07-23 PROCEDURE — 97140 MANUAL THERAPY 1/> REGIONS: CPT | Performed by: PHYSICAL THERAPIST
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
## 2020-07-27 ENCOUNTER — TREATMENT (OUTPATIENT)
Dept: PHYSICAL THERAPY | Facility: CLINIC | Age: 50
End: 2020-07-27

## 2020-07-27 ENCOUNTER — TELEPHONE (OUTPATIENT)
Dept: OTOLARYNGOLOGY | Facility: CLINIC | Age: 50
End: 2020-07-27
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
## 2020-07-27 DIAGNOSIS — S16.1XXD STRAIN OF NECK MUSCLE, SUBSEQUENT ENCOUNTER: ICD-10-CM

## 2020-07-27 DIAGNOSIS — M54.2 CERVICALGIA: Primary | ICD-10-CM

## 2020-07-27 DIAGNOSIS — E04.1 THYROID NODULE: Primary | ICD-10-CM

## 2020-07-27 PROCEDURE — 97140 MANUAL THERAPY 1/> REGIONS: CPT | Performed by: PHYSICAL THERAPIST

## 2020-07-27 PROCEDURE — 97110 THERAPEUTIC EXERCISES: CPT | Performed by: PHYSICAL THERAPIST

## 2020-07-27 PROCEDURE — 97035 APP MDLTY 1+ULTRASOUND EA 15: CPT | Performed by: PHYSICAL THERAPIST
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
## 2020-07-27 NOTE — TELEPHONE ENCOUNTER
----- Message from Sylvia Simpson sent at 7/27/2020 12:57 PM CDT -----  Contact: [phone]  Calling for biopsy results.  Says she has heard from the first one but not the second.    Patient informed of benign results.  Recommend reevaluation in 1 year with a repeat ultrasound.  Patient is agreeable.

## 2020-07-30 ENCOUNTER — TREATMENT (OUTPATIENT)
Dept: PHYSICAL THERAPY | Facility: CLINIC | Age: 50
End: 2020-07-30

## 2020-07-30 DIAGNOSIS — S16.1XXD STRAIN OF NECK MUSCLE, SUBSEQUENT ENCOUNTER: ICD-10-CM

## 2020-07-30 DIAGNOSIS — M54.2 CERVICALGIA: Primary | ICD-10-CM
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
## 2020-07-30 PROCEDURE — 97110 THERAPEUTIC EXERCISES: CPT | Performed by: PHYSICAL THERAPIST

## 2020-07-30 PROCEDURE — 97035 APP MDLTY 1+ULTRASOUND EA 15: CPT | Performed by: PHYSICAL THERAPIST

## 2020-07-30 PROCEDURE — 97140 MANUAL THERAPY 1/> REGIONS: CPT | Performed by: PHYSICAL THERAPIST
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
## 2020-08-06 ENCOUNTER — TREATMENT (OUTPATIENT)
Dept: PHYSICAL THERAPY | Facility: CLINIC | Age: 50
End: 2020-08-06

## 2020-08-06 DIAGNOSIS — S16.1XXD STRAIN OF NECK MUSCLE, SUBSEQUENT ENCOUNTER: ICD-10-CM

## 2020-08-06 DIAGNOSIS — M54.2 CERVICALGIA: Primary | ICD-10-CM
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
## 2020-08-06 PROCEDURE — 97035 APP MDLTY 1+ULTRASOUND EA 15: CPT | Performed by: PHYSICAL THERAPIST

## 2020-08-06 PROCEDURE — 97140 MANUAL THERAPY 1/> REGIONS: CPT | Performed by: PHYSICAL THERAPIST

## 2020-08-06 PROCEDURE — 97110 THERAPEUTIC EXERCISES: CPT | Performed by: PHYSICAL THERAPIST
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
## 2020-11-18 ENCOUNTER — OFFICE VISIT (OUTPATIENT)
Dept: OTOLARYNGOLOGY | Facility: CLINIC | Age: 50
End: 2020-11-18

## 2020-11-18 VITALS — BODY MASS INDEX: 22.88 KG/M2 | HEIGHT: 64 IN | WEIGHT: 134 LBS | OXYGEN SATURATION: 98 % | TEMPERATURE: 98.1 F

## 2020-11-18 DIAGNOSIS — H93.293 ABNORMAL AUDITORY PERCEPTION, BILATERAL: Primary | ICD-10-CM

## 2020-11-18 PROCEDURE — 99212 OFFICE O/P EST SF 10 MIN: CPT | Performed by: OTOLARYNGOLOGY

## 2020-11-18 RX ORDER — TOLTERODINE 4 MG/1
CAPSULE, EXTENDED RELEASE ORAL
COMMUNITY
Start: 2020-10-19 | End: 2021-05-19

## 2020-11-18 RX ORDER — TIZANIDINE 4 MG/1
TABLET ORAL
COMMUNITY
Start: 2020-11-07 | End: 2021-08-09

## 2020-11-22 NOTE — PROGRESS NOTES
Subjective   Vielka Pratt is a 50 y.o. female.       History of Present Illness   Patient is known to me with a history of a thyroid nodule that was an incidental finding on MRI and had subsequent benign needle biopsy.  He is due for follow-up on this in August 2021.  Comes in today because she says her ears feel stopped up and she thinks they may need cleaning.  She says this is worse on the right than the left.  No otorrhea.      The following portions of the patient's history were reviewed and updated as appropriate: allergies, current medications, past family history, past medical history, past social history, past surgical history and problem list.     reports that she has been smoking cigarettes. She has been smoking about 1.00 pack per day. She has never used smokeless tobacco. She reports that she does not drink alcohol or use drugs.   Patient is a tobacco user and has been counseled for use of tobacco products      Review of Systems        Objective   Physical Exam  Ears: External ears no deformity, canals no discharge, tympanic membranes intact clear and mobile bilaterally.      Assessment/Plan   Diagnoses and all orders for this visit:    1. Abnormal auditory perception, bilateral (Primary)        Plan: Reassured the patient that her ears were not obstructed.  Explained that audiology was not available in the office today and offered to bring her back with an audiogram in the future but she declines.  She will just keep her previously scheduled follow-up regarding her thyroid

## 2021-02-15 ENCOUNTER — CLINICAL SUPPORT (OUTPATIENT)
Dept: FAMILY MEDICINE CLINIC | Facility: CLINIC | Age: 51
End: 2021-02-15

## 2021-02-15 DIAGNOSIS — Z11.1 SCREENING FOR TUBERCULOSIS: Primary | ICD-10-CM

## 2021-02-15 PROCEDURE — 86580 TB INTRADERMAL TEST: CPT | Performed by: NURSE PRACTITIONER

## 2021-02-17 LAB
INDURATION: 0 MM (ref 0–10)
Lab: NORMAL
Lab: NORMAL
TB SKIN TEST: NEGATIVE

## 2021-05-19 ENCOUNTER — OFFICE VISIT (OUTPATIENT)
Dept: SURGERY | Facility: CLINIC | Age: 51
End: 2021-05-19

## 2021-05-19 VITALS
BODY MASS INDEX: 22.53 KG/M2 | WEIGHT: 132 LBS | HEIGHT: 64 IN | SYSTOLIC BLOOD PRESSURE: 118 MMHG | HEART RATE: 83 BPM | DIASTOLIC BLOOD PRESSURE: 84 MMHG | TEMPERATURE: 97.1 F

## 2021-05-19 DIAGNOSIS — D17.22 LIPOMA OF LEFT UPPER EXTREMITY: Primary | ICD-10-CM

## 2021-05-19 PROCEDURE — 99203 OFFICE O/P NEW LOW 30 MIN: CPT | Performed by: SURGERY

## 2021-05-19 RX ORDER — HYDROCODONE BITARTRATE AND ACETAMINOPHEN 10; 325 MG/1; MG/1
1 TABLET ORAL 3 TIMES DAILY
COMMUNITY
Start: 2021-05-01

## 2021-05-19 RX ORDER — IBUPROFEN 800 MG/1
800 TABLET ORAL AS NEEDED
COMMUNITY
Start: 2021-04-29

## 2021-05-19 NOTE — PROGRESS NOTES
Subjective   Vielka Pratt is a 51 y.o. female     Chief Complaint: Lipoma left arm on ultrasound    History of Present Illness referred after patient presented to urgent care  with pain in her left forearm and hand.  Pain is described as involving the entire hand and forearm and circumferential.  No history of injury.  Patient does have a history of arthritis.  Patient was concerned that she has been swelling on the medial aspect of her elbow and ultrasound was done.  Ultrasound stated the veins and vessels were normal in her arm but she had a 6 x 7 mm lipoma medial aspect of the left forearm.  No masses described on ultrasound    Review of Systems   HENT: Negative.    Respiratory: Positive for cough and wheezing.    Cardiovascular: Negative.    Gastrointestinal: Negative.    Endocrine: Negative.    Genitourinary: Negative.    Musculoskeletal:        Arthritis   Skin: Negative.    Allergic/Immunologic: Negative.    Neurological: Negative.    Hematological: Negative.    Psychiatric/Behavioral: Negative.      Past Medical History:   Diagnosis Date   • Abnormal granulation tissue    • Acute bacterial pharyngitis    • Acute tonsillitis, unspecified    • Allergic asthma     IgE-mediated allergic asthma      • Allergic rhinitis due to pollen    • Anxiety    • Asthma    • Back pain    • Backache    • Breast lump    • Cataracts, bilateral    • Cellulitis of trunk     Postoperative   • Chronic pain    • Corns and callus    • Cough    • Depressive disorder    • Diarrhea    • Dysphagia    • Dysuria    • Endometriosis    • Family history of malignant neoplasm of gastrointestinal tract    • GERD (gastroesophageal reflux disease)    • Hirsutism    • Hypertension    • Irritable bowel syndrome    • Leukorrhea, not specified as infective    • Mastitis    • Migraine    • Sebaceous cyst    • Shortness of breath    • Tick bite    • Tobacco abuse disorder 7/6/2017   • Tobacco user    • Upper respiratory infection       Past Surgical History:   Procedure Laterality Date   • CATARACT EXTRACTION, BILATERAL     • CHOLECYSTECTOMY     • COLONOSCOPY N/A 4/2/2018    Procedure: COLONOSCOPY;  Surgeon: Van Romano DO;  Location: [facility] ENDOSCOPY;  Service: Gastroenterology   • DIAGNOSTIC LAPAROSCOPY  04/08/2008    Laparosc diagnostic (2)      • ENDOSCOPY  03/26/2013    Colon endoscopy 03927 (2)      • ENDOSCOPY N/A 4/2/2018    Procedure: ESOPHAGOGASTRODUODENOSCOPY;  Surgeon: Van Romano DO;  Location: [facility] ENDOSCOPY;  Service: Gastroenterology   • ENDOSCOPY W/ PEG TUBE PLACEMENT  03/26/2013    EGD w/ tube 13712 (2)      • EXCISION LESION  01/30/2013    EXC TR-EXT Benign+Irina 0.6-1 CM 71745 (1)      • HAND TENOLYSIS  07/23/1992    Hand/finger surgery (1)      • HEAD & NECK SKIN LESION EXCISIONAL BIOPSY  01/31/1994    Biopsy of Skin 58550 (2)      • INCISION AND DRAINAGE BREAST ABSCESS  12/03/2012    Anesth, surgery of breast (1)      • INJECTION OF MEDICATION  09/12/2011    B12 (1)      • INJECTION OF MEDICATION  04/28/2015    Celestone (betamethasone) (1)     • INJECTION OF MEDICATION  08/17/2015    Depo Medrol (Methylprednisone) (6)      • INJECTION OF MEDICATION  06/08/2012    Kenalog (3)      • INJECTION OF MEDICATION  06/08/2012    Toradol (1)      • NASOLACRIMAL DUCT PROBING  08/16/1973    Probe nasolacrimal duct (1)      • NEPHROSTOMY TRACT DILATATION W/ LITHOTRIPSY     • PAP SMEAR  03/20/2008    PAP SMEAR (1)      • TOTAL ABDOMINAL HYSTERECTOMY WITH SALPINGO OOPHORECTOMY  04/09/2012    ERLIN/BSO (1)      • US GUIDED FINE NEEDLE ASPIRATION  6/26/2020   • US GUIDED FINE NEEDLE ASPIRATION  7/17/2020     Family History   Problem Relation Age of Onset   • Cancer Other         other   • Colon cancer Other         Colorectal Cancer   • Diabetes Other    • Heart disease Other    • Hypertension Other    • Lung cancer Other    • Hypertension Mother    • Diabetes Brother    • Lung cancer Father    • Cancer Paternal Aunt       Social History     Socioeconomic History   • Marital status:      Spouse name: Not on file   • Number of children: Not on file   • Years of education: Not on file   • Highest education level: Not on file   Tobacco Use   • Smoking status: Current Every Day Smoker     Packs/day: 1.00     Types: Cigarettes   • Smokeless tobacco: Never Used   Substance and Sexual Activity   • Alcohol use: No   • Drug use: No     Allergies   Allergen Reactions   • Meat Extract Other (See Comments)     Beef,pork,lamb. All mammal meat  Causes pain   • Contrast Dye Hives   • Iodinated Diagnostic Agents Hives and Rash   • Iron Nausea And Vomiting   • Naproxen Rash   • Penicillins Rash   • Sulfa Antibiotics Rash   • Sulfur Rash   • Tetracyclines & Related Rash     Vitals:    05/19/21 0840   BP: 118/84   Pulse: 83   Temp: 97.1 °F (36.2 °C)       Home Medications:  Prior to Admission medications    Medication Sig Start Date End Date Taking? Authorizing Provider   Biotin 1000 MCG tablet Take 1,000 mcg by mouth.   Yes Diane Lassiter MD   carvedilol (COREG) 12.5 MG tablet Take 12.5 mg by mouth 2 (Two) Times a Day With Meals.   Yes Emergency, Nurse Epic, RN   escitalopram (LEXAPRO) 20 MG tablet TAKE 1 TABLET BY MOUTH DAILY 2/21/19  Yes Diane Lassiter MD   estradiol (ESTRACE) 0.1 MG/GM vaginal cream INSERT 1/2 OF AN APPLICATOR FULL VAGINALLY 2 TIMES A WEEK 4/30/19  Yes Diane Lassiter MD   fluorometholone (FML) 0.1 % ophthalmic suspension  1/18/20  Yes Diane Lassiter MD   HYDROcodone-acetaminophen (NORCO)  MG per tablet Take 1 tablet by mouth 3 (Three) Times a Day. 5/1/21  Yes Diane Lassiter MD   ibuprofen (ADVIL,MOTRIN) 800 MG tablet Take 800 mg by mouth As Needed. 4/29/21  Yes Diane Lassiter MD   metFORMIN (GLUCOPHAGE) 500 MG tablet TAKE 1 TABLET BY MOUTH TWICE DAILY WITH MEALS 8/19/19  Yes Diane Lassiter MD   MUCINEX 600 MG 12 hr tablet  12/3/19  Yes Diane Lassiter MD    pantoprazole (PROTONIX) 40 MG EC tablet TAKE 1 TABLET EVERY MORNING BEFORE BREAKFAST 7/22/19  Yes Diane Lassiter MD   albuterol (VENTOLIN HFA) 108 (90 Base) MCG/ACT inhaler 2 puffs every 4 hours as needed for breathing 10/25/17   Percy Sanders MD   EPINEPHrine (EPIPEN) 0.3 MG/0.3ML solution auto-injector injection Inject 0.3 mg into the appropriate muscle as directed by prescriber. 8/24/18   Diane Lassiter MD   guaiFENesin (MUCINEX) 600 MG 12 hr tablet Take 600 mg by mouth. 12/3/19   Diane Lassiter MD   hydrOXYzine pamoate (VISTARIL) 25 MG capsule Take 25 mg by mouth. 1/10/19   Diane Lassiter MD   montelukast (SINGULAIR) 10 MG tablet Take 10 mg by mouth Every Night. 8/27/16   Diane Lassiter MD   nitroglycerin (NITROSTAT) 0.4 MG SL tablet Place 0.4 mg under the tongue. 8/14/18   Diane Lassiter MD   nortriptyline (PAMELOR) 10 MG capsule  5/28/20   Diane Lassiter MD   ondansetron (ZOFRAN) 4 MG tablet Take 1 tablet by mouth Every 6 (Six) Hours As Needed for Nausea or Vomiting. 3/22/17   Siddharth Jain MD   PROAIR  (90 BASE) MCG/ACT inhaler  9/2/16   Diane Lassiter MD   propylthiouracil (PTU) 50 MG tablet Take 50 mg by mouth 3 (Three) Times a Day. 10/25/18   Diane Lassiter MD   QUEtiapine (SEROquel) 100 MG tablet  12/24/19   Diane Lassiter MD   raNITIdine (ZANTAC) 150 MG tablet TAKE 1 TABLET TWICE DAILY 7/22/19   Diane Lassiter MD   SUMAtriptan (IMITREX) 50 MG tablet Take 50 mg by mouth. 7/10/17   Diane Lassiter MD   tiZANidine (ZANAFLEX) 4 MG tablet  11/7/20   Diane Lassiter MD   TROKENDI  MG capsule sustained-release 24 hr Take 1 tablet by mouth Daily. 8/27/16   Diane Lassiter MD   celecoxib (CeleBREX) 200 MG capsule Take 200 mg by mouth 2 (Two) Times a Day.  5/19/21  Provider, MD Diane   dicyclomine (BENTYL) 20 MG tablet TAKE 1 TABLET BY MOUTH EVERY 6 HOURS. 4/10/17 5/19/21  Emergency, Nurse  Epic, RN   fluconazole (DIFLUCAN) 150 MG tablet  12/3/19 5/19/21  Diane Lassiter MD   ketoconazole (NIZORAL) 2 % shampoo Apply 1 dose topically to the appropriate area as directed.  5/19/21  Diane Lassiter MD   levoFLOXacin (LEVAQUIN) 500 MG tablet  12/3/19 5/19/21  Diane Lassiter MD   methylPREDNISolone (MEDROL, MARY,) 4 MG tablet  12/3/19 5/19/21  Diane Lassiter MD   oxybutynin (DITROPAN) 5 MG tablet TAKE 1 TABLET THREE TIMES DAILY 7/22/19 5/19/21  Diane Lassiter MD   predniSONE (DELTASONE) 20 MG tablet Take 1 tablet by mouth Daily. 2/10/20 5/19/21  Percy Sanders MD   pregabalin (LYRICA) 75 MG capsule Take 75 mg by mouth. 7/26/18 5/19/21  Diane Lassiter MD   spironolactone (ALDACTONE) 25 MG tablet  1/23/20 5/19/21  Diane Lassiter MD   spironolactone (ALDACTONE) 50 MG tablet Take 50 mg by mouth Daily.  5/19/21  Diane Lassiter MD   tolterodine LA (DETROL LA) 4 MG 24 hr capsule  10/19/20 5/19/21  Diane Lassiter MD       Objective   Physical Exam  Constitutional:       General: She is not in acute distress.     Appearance: Normal appearance. She is normal weight. She is not ill-appearing or toxic-appearing.   Musculoskeletal:      Cervical back: Normal range of motion and neck supple. No tenderness.   Lymphadenopathy:      Cervical: No cervical adenopathy.   Neurological:      Mental Status: She is alert.     Left arm appears normal.  Questionable minimal swelling medial aspect of her left elbow where the lipoma reportedly was seen by ultrasound.  No obvious distinct masses appreciated.  He has good range of motion of the elbow and wrist hands all fingers are involved with the discomfort.  Palpable pulses distally.  No arterial or venous changes.    Assessment/Plan Very small subcutaneous lipomatous, ultrasound and not causing discomfort she describes her arm went over this with her demonstrator how large the lipoma is which is 6 by 7 mm.  There is no  mass reported on ultrasound.  Patient will follow up with her primary care provider for further recommendations and possible work-up of this pain that she describes her left arm.  No obvious mass by ultrasound by my exam today.  She understands answered all of her questions.      There were no encounter diagnoses.                     This document has been electronically signed by Karyna Wolf MA on May 19, 2021 08:54 CDT

## 2021-05-19 NOTE — PATIENT INSTRUCTIONS
MyPlate from USDA    MyPlate is an outline of a general healthy diet based on the 2010 Dietary Guidelines for Americans, from the U.S. Department of Agriculture (USDA). It sets guidelines for how much food you should eat from each food group based on your age, sex, and level of physical activity.  What are tips for following MyPlate?  To follow MyPlate recommendations:  · Eat a wide variety of fruits and vegetables, grains, and protein foods.  · Serve smaller portions and eat less food throughout the day.  · Limit portion sizes to avoid overeating.  · Enjoy your food.  · Get at least 150 minutes of exercise every week. This is about 30 minutes each day, 5 or more days per week.  It can be difficult to have every meal look like MyPlate. Think about MyPlate as eating guidelines for an entire day, rather than each individual meal.  Fruits and vegetables  · Make half of your plate fruits and vegetables.  · Eat many different colors of fruits and vegetables each day.  · For a 2,000 calorie daily food plan, eat:  ? 2½ cups of vegetables every day.  ? 2 cups of fruit every day.  · 1 cup is equal to:  ? 1 cup raw or cooked vegetables.  ? 1 cup raw fruit.  ? 1 medium-sized orange, apple, or banana.  ? 1 cup 100% fruit or vegetable juice.  ? 2 cups raw leafy greens, such as lettuce, spinach, or kale.  ? ½ cup dried fruit.  Grains  · One fourth of your plate should be grains.  · Make at least half of the grains you eat each day whole grains.  · For a 2,000 calorie daily food plan, eat 6 oz of grains every day.  · 1 oz is equal to:  ? 1 slice bread.  ? 1 cup cereal.  ? ½ cup cooked rice, cereal, or pasta.  Protein  · One fourth of your plate should be protein.  · Eat a wide variety of protein foods, including meat, poultry, fish, eggs, beans, nuts, and tofu.  · For a 2,000 calorie daily food plan, eat 5½ oz of protein every day.  · 1 oz is equal to:  ? 1 oz meat, poultry, or fish.  ? ¼ cup cooked beans.  ? 1 egg.  ? ½ oz nuts  or seeds.  ? 1 Tbsp peanut butter.  Dairy  · Drink fat-free or low-fat (1%) milk.  · Eat or drink dairy as a side to meals.  · For a 2,000 calorie daily food plan, eat or drink 3 cups of dairy every day.  · 1 cup is equal to:  ? 1 cup milk, yogurt, cottage cheese, or soy milk (soy beverage).  ? 2 oz processed cheese.  ? 1½ oz natural cheese.  Fats, oils, salt, and sugars  · Only small amounts of oils are recommended.  · Avoid foods that are high in calories and low in nutritional value (empty calories), like foods high in fat or added sugars.  · Choose foods that are low in salt (sodium). Choose foods that have less than 140 milligrams (mg) of sodium per serving.  · Drink water instead of sugary drinks. Drink enough water each day to keep your urine pale yellow.  Where to find support  · Work with your health care provider or a nutrition specialist (dietitian) to develop a customized eating plan that is right for you.  · Download an solomon (mobile application) to help you track your daily food intake.  Where to find more information  · Go to ChooseMyPlate.gov for more information.  Summary  · MyPlate is a general guideline for healthy eating from the USDA. It is based on the 2010 Dietary Guidelines for Americans.  · In general, fruits and vegetables should take up ½ of your plate, grains should take up ¼ of your plate, and protein should take up ¼ of your plate.  This information is not intended to replace advice given to you by your health care provider. Make sure you discuss any questions you have with your health care provider.  Document Revised: 05/21/2020 Document Reviewed: 03/19/2018  Elsevier Patient Education © 2021 [employer].

## 2021-07-27 ENCOUNTER — HOSPITAL ENCOUNTER (OUTPATIENT)
Dept: ULTRASOUND IMAGING | Facility: HOSPITAL | Age: 51
Discharge: HOME | End: 2021-07-27
Admitting: OTOLARYNGOLOGY

## 2021-07-27 DIAGNOSIS — E04.1 THYROID NODULE: ICD-10-CM

## 2021-07-27 PROCEDURE — 76536 US EXAM OF HEAD AND NECK: CPT

## 2021-08-09 ENCOUNTER — OFFICE VISIT (OUTPATIENT)
Dept: OTOLARYNGOLOGY | Facility: CLINIC | Age: 51
End: 2021-08-09

## 2021-08-09 VITALS — HEIGHT: 64 IN | HEART RATE: 76 BPM | OXYGEN SATURATION: 98 % | BODY MASS INDEX: 22.81 KG/M2 | WEIGHT: 133.6 LBS

## 2021-08-09 DIAGNOSIS — E04.1 THYROID NODULE: Primary | ICD-10-CM

## 2021-08-09 PROCEDURE — 99213 OFFICE O/P EST LOW 20 MIN: CPT | Performed by: OTOLARYNGOLOGY

## 2021-08-12 NOTE — PROGRESS NOTES
Subjective   Vielka Pratt is a 51 y.o. female.       History of Present Illness   Patient is followed with a right-sided thyroid nodule.  Previous needle biopsy was benign.  Follow-up with annual ultrasound was recommended.  Returns today having had the ultrasound.  Results are reviewed and shows that the nodule in the right lower pole is unchanged from previous.  She is not having any new symptoms of difficulty swallowing or voice change      The following portions of the patient's history were reviewed and updated as appropriate: allergies, current medications, past family history, past medical history, past social history, past surgical history and problem list.     reports that she has been smoking cigarettes. She has been smoking about 1.00 pack per day. She has never used smokeless tobacco. She reports that she does not drink alcohol and does not use drugs.   Patient is a tobacco user and has been counseled for use of tobacco products      Review of Systems        Objective   Physical Exam    Neck: Thyroid is nonpalpable.  No lymphadenopathy.    Assessment/Plan   Diagnoses and all orders for this visit:    1. Thyroid nodule (Primary)      Plan: With stable radiographic findings and no subjective symptoms recommend reevaluation in 1 year with repeat ultrasound.  If findings remain stable may consider increased interval between ultrasounds afterwards

## 2022-03-11 DIAGNOSIS — M79.642 LEFT HAND PAIN: Primary | ICD-10-CM

## 2022-03-18 ENCOUNTER — LAB (OUTPATIENT)
Dept: LAB | Facility: HOSPITAL | Age: 52
End: 2022-03-18

## 2022-03-18 ENCOUNTER — TELEPHONE (OUTPATIENT)
Dept: ORTHOPEDIC SURGERY | Facility: CLINIC | Age: 52
End: 2022-03-18

## 2022-03-18 ENCOUNTER — OFFICE VISIT (OUTPATIENT)
Dept: ORTHOPEDIC SURGERY | Facility: CLINIC | Age: 52
End: 2022-03-18

## 2022-03-18 VITALS — WEIGHT: 139 LBS | BODY MASS INDEX: 23.73 KG/M2 | HEIGHT: 64 IN

## 2022-03-18 DIAGNOSIS — M79.642 BILATERAL HAND PAIN: Primary | ICD-10-CM

## 2022-03-18 DIAGNOSIS — M25.50 MULTIPLE JOINT PAIN: ICD-10-CM

## 2022-03-18 DIAGNOSIS — M79.641 BILATERAL HAND PAIN: Primary | ICD-10-CM

## 2022-03-18 DIAGNOSIS — M79.642 BILATERAL HAND PAIN: ICD-10-CM

## 2022-03-18 DIAGNOSIS — E11.9 TYPE 2 DIABETES MELLITUS WITHOUT COMPLICATION, WITHOUT LONG-TERM CURRENT USE OF INSULIN: ICD-10-CM

## 2022-03-18 DIAGNOSIS — K21.9 GASTROESOPHAGEAL REFLUX DISEASE WITHOUT ESOPHAGITIS: ICD-10-CM

## 2022-03-18 DIAGNOSIS — M79.641 BILATERAL HAND PAIN: ICD-10-CM

## 2022-03-18 LAB
BASOPHILS # BLD AUTO: 0.06 10*3/MM3 (ref 0–0.2)
BASOPHILS NFR BLD AUTO: 0.6 % (ref 0–1.5)
CHROMATIN AB SERPL-ACNC: <10 IU/ML (ref 0–14)
CRP SERPL-MCNC: <0.3 MG/DL (ref 0–0.5)
DEPRECATED RDW RBC AUTO: 43.3 FL (ref 37–54)
EOSINOPHIL # BLD AUTO: 0.36 10*3/MM3 (ref 0–0.4)
EOSINOPHIL NFR BLD AUTO: 3.7 % (ref 0.3–6.2)
ERYTHROCYTE [DISTWIDTH] IN BLOOD BY AUTOMATED COUNT: 13.4 % (ref 12.3–15.4)
ERYTHROCYTE [SEDIMENTATION RATE] IN BLOOD: 18 MM/HR (ref 0–20)
HCT VFR BLD AUTO: 45 % (ref 34–46.6)
HGB BLD-MCNC: 15.1 G/DL (ref 12–15.9)
IMM GRANULOCYTES # BLD AUTO: 0.04 10*3/MM3 (ref 0–0.05)
IMM GRANULOCYTES NFR BLD AUTO: 0.4 % (ref 0–0.5)
LYMPHOCYTES # BLD AUTO: 3.28 10*3/MM3 (ref 0.7–3.1)
LYMPHOCYTES NFR BLD AUTO: 33.4 % (ref 19.6–45.3)
MCH RBC QN AUTO: 30 PG (ref 26.6–33)
MCHC RBC AUTO-ENTMCNC: 33.6 G/DL (ref 31.5–35.7)
MCV RBC AUTO: 89.3 FL (ref 79–97)
MONOCYTES # BLD AUTO: 0.55 10*3/MM3 (ref 0.1–0.9)
MONOCYTES NFR BLD AUTO: 5.6 % (ref 5–12)
NEUTROPHILS NFR BLD AUTO: 5.54 10*3/MM3 (ref 1.7–7)
NEUTROPHILS NFR BLD AUTO: 56.3 % (ref 42.7–76)
NRBC BLD AUTO-RTO: 0 /100 WBC (ref 0–0.2)
PLATELET # BLD AUTO: 303 10*3/MM3 (ref 140–450)
PMV BLD AUTO: 10.7 FL (ref 6–12)
RBC # BLD AUTO: 5.04 10*6/MM3 (ref 3.77–5.28)
WBC NRBC COR # BLD: 9.83 10*3/MM3 (ref 3.4–10.8)

## 2022-03-18 PROCEDURE — 86140 C-REACTIVE PROTEIN: CPT

## 2022-03-18 PROCEDURE — 81374 HLA I TYPING 1 ANTIGEN LR: CPT

## 2022-03-18 PROCEDURE — 85025 COMPLETE CBC W/AUTO DIFF WBC: CPT

## 2022-03-18 PROCEDURE — 36415 COLL VENOUS BLD VENIPUNCTURE: CPT

## 2022-03-18 PROCEDURE — 99204 OFFICE O/P NEW MOD 45 MIN: CPT | Performed by: ORTHOPAEDIC SURGERY

## 2022-03-18 PROCEDURE — 85652 RBC SED RATE AUTOMATED: CPT

## 2022-03-18 PROCEDURE — 86038 ANTINUCLEAR ANTIBODIES: CPT

## 2022-03-18 PROCEDURE — 86431 RHEUMATOID FACTOR QUANT: CPT

## 2022-03-18 PROCEDURE — 86225 DNA ANTIBODY NATIVE: CPT

## 2022-03-18 RX ORDER — MELOXICAM 15 MG/1
TABLET ORAL
Qty: 30 TABLET | Refills: 3 | Status: SHIPPED | OUTPATIENT
Start: 2022-03-18

## 2022-03-18 NOTE — TELEPHONE ENCOUNTER
Pt CALLED AND LEFT A VOICEMAIL STATING SHE HAD CHECKED WITH [facility] FOR HER PRESCRIPTION BUT THEY DID NOT HAVE ANYTHING FROM US    PLEASE ADVISE   CALL BACK # [phone]

## 2022-03-18 NOTE — PROGRESS NOTES
Vielka Pratt is a 52 y.o. female   Primary provider:  Nick Neal MD       Chief Complaint   Patient presents with   • Right Hand - Pain   • Left Hand - Pain       HISTORY OF PRESENT ILLNESS:  rojelio hand/finger pain started about 2 years ago. xrays done today. Patient has locking in right and left thumb and index fingers.    Patient reports pain in both hands for several months.  She states that the pain in her left index and her right thumb are the worst.  She has some numbness and tingling in both hands that is intermittent but usually involves thumb index and middle fingers.  She does not report increased pain/numbness at night or with driving.  She had a trigger finger release on the left ring finger in the remote past.  She has a personal history of eczema.  She does not report any specific catching or locking in her hands but has pain and difficulty using her hands at times.    Pain  This is a chronic problem. The current episode started more than 1 year ago. The problem has been gradually worsening. Associated symptoms comments: Aching, . She has tried ice and heat for the symptoms.        CONCURRENT MEDICAL HISTORY:    Past Medical History:   Diagnosis Date   • Abnormal granulation tissue    • Acute bacterial pharyngitis    • Acute tonsillitis, unspecified    • Allergic asthma     IgE-mediated allergic asthma      • Allergic rhinitis due to pollen    • Anxiety    • Asthma    • Back pain    • Backache    • Breast lump    • Cataracts, bilateral    • Cellulitis of trunk     Postoperative   • Chronic pain    • Corns and callus    • Cough    • Depressive disorder    • Diarrhea    • Dysphagia    • Dysuria    • Endometriosis    • Family history of malignant neoplasm of gastrointestinal tract    • GERD (gastroesophageal reflux disease)    • Hirsutism    • Hypertension    • Irritable bowel syndrome    • Leukorrhea, not specified as infective    • Mastitis    • Migraine    • Sebaceous cyst     • Shortness of breath    • Tick bite    • Tobacco abuse disorder 7/6/2017   • Tobacco user    • Upper respiratory infection        Allergies   Allergen Reactions   • Meat Extract Other (See Comments)     Beef,pork,lamb. All mammal meat  Causes pain   • Contrast Dye Hives   • Elemental Sulfur Rash   • Iodinated Diagnostic Agents Hives and Rash   • Iron Nausea And Vomiting   • Naproxen Rash   • Penicillins Rash   • Sulfa Antibiotics Rash   • Tetracyclines & Related Rash         Current Outpatient Medications:   •  albuterol (VENTOLIN HFA) 108 (90 Base) MCG/ACT inhaler, 2 puffs every 4 hours as needed for breathing, Disp: 1 inhaler, Rfl: 5  •  Biotin 1000 MCG tablet, Take 1,000 mcg by mouth., Disp: , Rfl:   •  carvedilol (COREG) 12.5 MG tablet, Take 12.5 mg by mouth 2 (Two) Times a Day With Meals., Disp: , Rfl:   •  EPINEPHrine (EPIPEN) 0.3 MG/0.3ML solution auto-injector injection, Inject 0.3 mg into the appropriate muscle as directed by prescriber., Disp: , Rfl:   •  escitalopram (LEXAPRO) 20 MG tablet, TAKE 1 TABLET BY MOUTH DAILY, Disp: , Rfl:   •  estradiol (ESTRACE) 0.1 MG/GM vaginal cream, INSERT 1/2 OF AN APPLICATOR FULL VAGINALLY 2 TIMES A WEEK, Disp: , Rfl:   •  fluorometholone (FML) 0.1 % ophthalmic suspension, , Disp: , Rfl:   •  guaiFENesin (MUCINEX) 600 MG 12 hr tablet, Take 600 mg by mouth., Disp: , Rfl:   •  HYDROcodone-acetaminophen (NORCO)  MG per tablet, Take 1 tablet by mouth 3 (Three) Times a Day., Disp: , Rfl:   •  hydrOXYzine pamoate (VISTARIL) 25 MG capsule, Take 25 mg by mouth., Disp: , Rfl:   •  ibuprofen (ADVIL,MOTRIN) 800 MG tablet, Take 800 mg by mouth As Needed., Disp: , Rfl:   •  metFORMIN (GLUCOPHAGE) 500 MG tablet, TAKE 1 TABLET BY MOUTH TWICE DAILY WITH MEALS, Disp: , Rfl:   •  montelukast (SINGULAIR) 10 MG tablet, Take 10 mg by mouth Every Night., Disp: , Rfl:   •  nortriptyline (PAMELOR) 10 MG capsule, , Disp: , Rfl:   •  ondansetron (ZOFRAN) 4 MG tablet, Take 1 tablet by  mouth Every 6 (Six) Hours As Needed for Nausea or Vomiting., Disp: 10 tablet, Rfl: 0  •  pantoprazole (PROTONIX) 40 MG EC tablet, TAKE 1 TABLET EVERY MORNING BEFORE BREAKFAST, Disp: , Rfl:   •  PROAIR  (90 BASE) MCG/ACT inhaler, , Disp: , Rfl:   •  propylthiouracil (PTU) 50 MG tablet, Take 50 mg by mouth 3 (Three) Times a Day., Disp: , Rfl:   •  SUMAtriptan (IMITREX) 50 MG tablet, Take 50 mg by mouth., Disp: , Rfl:   •  TROKENDI  MG capsule sustained-release 24 hr, Take 1 tablet by mouth Daily., Disp: , Rfl:   •  meloxicam (MOBIC) 15 MG tablet, 1 PO Daily with food., Disp: 30 tablet, Rfl: 3    Current Facility-Administered Medications:   •  methylPREDNISolone acetate (DEPO-medrol) injection 80 mg, 80 mg, Intramuscular, Once, Percy Sanders MD    Past Surgical History:   Procedure Laterality Date   • CATARACT EXTRACTION, BILATERAL     • CHOLECYSTECTOMY     • COLONOSCOPY N/A 4/2/2018    Procedure: COLONOSCOPY;  Surgeon: Van Romano DO;  Location: [facility] ENDOSCOPY;  Service: Gastroenterology   • DIAGNOSTIC LAPAROSCOPY  04/08/2008    Laparosc diagnostic (2)      • ENDOSCOPY  03/26/2013    Colon endoscopy 35122 (2)      • ENDOSCOPY N/A 4/2/2018    Procedure: ESOPHAGOGASTRODUODENOSCOPY;  Surgeon: Van Romano DO;  Location: [facility] ENDOSCOPY;  Service: Gastroenterology   • ENDOSCOPY W/ PEG TUBE PLACEMENT  03/26/2013    EGD w/ tube 45114 (2)      • EXCISION LESION  01/30/2013    EXC TR-EXT Benign+Irina 0.6-1 CM 69065 (1)      • HAND TENOLYSIS  07/23/1992    Hand/finger surgery (1)      • HEAD & NECK SKIN LESION EXCISIONAL BIOPSY  01/31/1994    Biopsy of Skin 91325 (2)      • INCISION AND DRAINAGE BREAST ABSCESS  12/03/2012    Anesth, surgery of breast (1)      • INJECTION OF MEDICATION  09/12/2011    B12 (1)      • INJECTION OF MEDICATION  04/28/2015    Celestone (betamethasone) (1)     • INJECTION OF MEDICATION  08/17/2015    Depo Medrol (Methylprednisone) (6)      • INJECTION OF MEDICATION   "06/08/2012    Kenalog (3)      • INJECTION OF MEDICATION  06/08/2012    Toradol (1)      • NASOLACRIMAL DUCT PROBING  08/16/1973    Probe nasolacrimal duct (1)      • NEPHROSTOMY TRACT DILATATION W/ LITHOTRIPSY     • PAP SMEAR  03/20/2008    PAP SMEAR (1)      • TOTAL ABDOMINAL HYSTERECTOMY WITH SALPINGO OOPHORECTOMY  04/09/2012    ERLIN/BSO (1)      • US GUIDED FINE NEEDLE ASPIRATION  6/26/2020   • US GUIDED FINE NEEDLE ASPIRATION  7/17/2020       Family History   Problem Relation Age of Onset   • Cancer Other         other   • Colon cancer Other         Colorectal Cancer   • Diabetes Other    • Heart disease Other    • Hypertension Other    • Lung cancer Other    • Hypertension Mother    • Diabetes Brother    • Lung cancer Father    • Cancer Paternal Aunt         Social History     Socioeconomic History   • Marital status:    Tobacco Use   • Smoking status: Current Every Day Smoker     Packs/day: 1.00     Types: Cigarettes   • Smokeless tobacco: Never Used   Substance and Sexual Activity   • Alcohol use: No   • Drug use: No        Review of Systems   Constitutional: Negative.    HENT: Negative.    Eyes: Negative.    Respiratory: Negative.    Cardiovascular: Negative.    Gastrointestinal: Negative.    Endocrine: Negative.    Genitourinary: Negative.    Musculoskeletal:        Bilateral hand pain and intermittent swelling.   Skin: Negative.    Allergic/Immunologic: Negative.    Neurological: Negative.    Hematological: Negative.    Psychiatric/Behavioral: Negative.        PHYSICAL EXAMINATION:       Ht 161.3 cm (63.5\")   Wt 63 kg (139 lb)   BMI 24.24 kg/m²     Physical Exam  Constitutional:       General: She is not in acute distress.     Appearance: Normal appearance.   Pulmonary:      Effort: Pulmonary effort is normal. No respiratory distress.   Neurological:      Mental Status: She is alert and oriented to person, place, and time.         GAIT:     [x]  Normal  []  Antalgic    Assistive device: [x]  " None  []  Walker     []  Crutches  []  Cane     []  Wheelchair  []  Stretcher    Ortho Exam  Both hands show general swelling.  She has good capillary refill and good distal sensation.  Good muscle tone but does have tenderness with  strength.  She has swelling around the joints of multiple fingers.  No active triggering.  No distinct nodule noted on the volar aspect of MCPs.    XR Hand 3+ View Bilateral    Result Date: 3/18/2022  Narrative: Ordering Provider:  Franco Childs MD Ordering Diagnosis/Indication:  Bilateral hand pain Procedure:  XR HAND 3+ VW BILATERAL Exam Date:  3/18/22 COMPARISON:  Not applicable, no relevant images available.     Impression:  3 views of both hands show acceptable position alignment with no evidence of acute bony abnormality.  No fracture or dislocation is noted.  No acute findings in either hand. Franco Childs MD 3/18/22           ASSESSMENT:    Diagnoses and all orders for this visit:    Bilateral hand pain  -     [place]; Future  -     CBC & Differential; Future  -     Rheumatoid Factor, Quant; Future  -     Sedimentation Rate; Future  -     C-reactive Protein; Future  -     HLA-B27 Antigen; Future    Multiple joint pain  -     [place]; Future  -     CBC & Differential; Future  -     Rheumatoid Factor, Quant; Future  -     Sedimentation Rate; Future  -     C-reactive Protein; Future  -     HLA-B27 Antigen; Future    Gastroesophageal reflux disease without esophagitis    Type 2 diabetes mellitus without complication, without long-term current use of insulin ([facility])    Other orders  -     meloxicam (MOBIC) 15 MG tablet; 1 PO Daily with food.          PLAN    Patient has pain in both hands.  She has a history of eczema and no specific family history for inflammatory arthritis processes.  X-rays do not show any significant osteoarthritis in her hands.  We discussed proceeding with meloxicam for anti-inflammatory usage to help with symptoms.  We also discussed doing the  inflammatory arthritis lab work to assess for the inflammatory arthritis process.  We discussed the possibility of EMG to assess the extent of possible carpal tunnel syndrome.  Follow-up in 4 weeks.    Return in about 4 weeks (around 4/15/2022) for recheck.    Franco Childs MD

## 2022-03-19 LAB
ANA SER QL: POSITIVE
DSDNA AB SER-ACNC: 1 IU/ML (ref 0–9)
Lab: NORMAL

## 2022-03-24 LAB — HLA-B27 QL NAA+PROBE: NEGATIVE

## 2022-08-02 ENCOUNTER — TELEPHONE (OUTPATIENT)
Dept: GENERAL RADIOLOGY | Facility: HOSPITAL | Age: 52
End: 2022-08-02

## 2022-08-08 DIAGNOSIS — E04.1 THYROID NODULE: Primary | ICD-10-CM

## 2022-08-12 ENCOUNTER — HOSPITAL ENCOUNTER (OUTPATIENT)
Dept: ULTRASOUND IMAGING | Facility: HOSPITAL | Age: 52
Discharge: HOME | End: 2022-08-12
Admitting: OTOLARYNGOLOGY

## 2022-08-12 DIAGNOSIS — E04.1 THYROID NODULE: ICD-10-CM

## 2022-08-12 PROCEDURE — 76536 US EXAM OF HEAD AND NECK: CPT

## 2022-08-24 ENCOUNTER — OFFICE VISIT (OUTPATIENT)
Dept: OTOLARYNGOLOGY | Facility: CLINIC | Age: 52
End: 2022-08-24

## 2022-08-24 VITALS — HEIGHT: 64 IN | WEIGHT: 139 LBS | TEMPERATURE: 97.5 F | BODY MASS INDEX: 23.73 KG/M2

## 2022-08-24 DIAGNOSIS — J31.0 CHRONIC RHINITIS: ICD-10-CM

## 2022-08-24 DIAGNOSIS — E04.1 THYROID NODULE: Primary | ICD-10-CM

## 2022-08-24 PROCEDURE — 99214 OFFICE O/P EST MOD 30 MIN: CPT | Performed by: OTOLARYNGOLOGY

## 2022-08-24 RX ORDER — AZELASTINE 1 MG/ML
2 SPRAY, METERED NASAL 2 TIMES DAILY
Qty: 30 ML | Refills: 11 | Status: SHIPPED | OUTPATIENT
Start: 2022-08-24

## 2022-08-28 NOTE — PROGRESS NOTES
Subjective   Vielka Pratt is a 52 y.o. female.       History of Present Illness   Patient is followed with a right-sided thyroid nodule.  Previously had needle biopsy that was benign.  Returns today having had an ultrasound.  Compared to 1 year ago there is been no change.  States that she had COVID back in January and ever since then she has had a lot of postnasal drainage and clear rhinorrhea.  Also states that her ears have been bothering her.      The following portions of the patient's history were reviewed and updated as appropriate: allergies, current medications, past family history, past medical history, past social history, past surgical history and problem list.     reports that she has been smoking cigarettes. She has been smoking about 1.00 pack per day. She has never used smokeless tobacco. She reports that she does not drink alcohol and does not use drugs.   Patient is a tobacco user and has been counseled for use of tobacco products      Review of Systems        Objective   Physical Exam  Ears: External ears no deformity, canals no discharge, tympanic membranes intact clear and mobile bilaterally.  Nares: Pale boggy mucosa no discharge or purulence  Oral cavity: No masses or lesions  Pharynx: Erythematous streaking consistent with postnasal drainage  Neck: No lymphadenopathy.  No thyromegaly.  Trachea and larynx midline.  No masses in the parotid or submandibular glands.  Specifically the thyroid is nonpalpable       Assessment and Plan   Diagnoses and all orders for this visit:    1. Thyroid nodule (Primary)    2. Chronic rhinitis    Other orders  -     azelastine (ASTELIN) 0.1 % nasal spray; 2 sprays into the nostril(s) as directed by provider 2 (Two) Times a Day. Use in each nostril as directed  Dispense: 30 mL; Refill: 11             Plan: Reassurance to the patient that her thyroid nodule is stable both clinically and radiographically.  Would suggest repeat clinical exam in 1 year but  we will not obtain another ultrasound until 2024 unless she has a change in clinical exam.  Explained that her ear symptoms are likely due to referred symptoms from her throat and prescribed Astelin 2 sprays each nostril twice a day as needed for allergy symptoms.  As long as this works well just keep 1 year appointment and call for problems.

## 2023-02-24 ENCOUNTER — HOSPITAL ENCOUNTER (EMERGENCY)
Facility: HOSPITAL | Age: 53
Discharge: HOME | End: 2023-02-24
Attending: FAMILY MEDICINE | Admitting: FAMILY MEDICINE
Payer: COMMERCIAL

## 2023-02-24 ENCOUNTER — APPOINTMENT (OUTPATIENT)
Dept: GENERAL RADIOLOGY | Facility: HOSPITAL | Age: 53
End: 2023-02-24
Payer: COMMERCIAL

## 2023-02-24 VITALS
DIASTOLIC BLOOD PRESSURE: 65 MMHG | SYSTOLIC BLOOD PRESSURE: 120 MMHG | RESPIRATION RATE: 18 BRPM | WEIGHT: 139 LBS | OXYGEN SATURATION: 98 % | BODY MASS INDEX: 23.73 KG/M2 | HEART RATE: 55 BPM | HEIGHT: 64 IN | TEMPERATURE: 97.9 F

## 2023-02-24 DIAGNOSIS — J06.9 UPPER RESPIRATORY TRACT INFECTION, UNSPECIFIED TYPE: ICD-10-CM

## 2023-02-24 DIAGNOSIS — R07.2 PRECORDIAL PAIN: Primary | ICD-10-CM

## 2023-02-24 LAB
ALBUMIN SERPL-MCNC: 4 G/DL (ref 3.5–5.2)
ALBUMIN/GLOB SERPL: 1.4 G/DL
ALP SERPL-CCNC: 90 U/L (ref 39–117)
ALT SERPL W P-5'-P-CCNC: 20 U/L (ref 1–33)
ANION GAP SERPL CALCULATED.3IONS-SCNC: 9 MMOL/L (ref 5–15)
AST SERPL-CCNC: 13 U/L (ref 1–32)
BILIRUB SERPL-MCNC: <0.2 MG/DL (ref 0–1.2)
BUN SERPL-MCNC: 11 MG/DL (ref 6–20)
BUN/CREAT SERPL: 16.9 (ref 7–25)
CALCIUM SPEC-SCNC: 8.9 MG/DL (ref 8.6–10.5)
CHLORIDE SERPL-SCNC: 108 MMOL/L (ref 98–107)
CO2 SERPL-SCNC: 22 MMOL/L (ref 22–29)
CREAT SERPL-MCNC: 0.65 MG/DL (ref 0.57–1)
DEPRECATED RDW RBC AUTO: 45 FL (ref 37–54)
EGFRCR SERPLBLD CKD-EPI 2021: 107.4 ML/MIN/1.73
EOSINOPHIL # BLD MANUAL: 0.2 10*3/MM3 (ref 0–0.4)
EOSINOPHIL NFR BLD MANUAL: 2 % (ref 0.3–6.2)
ERYTHROCYTE [DISTWIDTH] IN BLOOD BY AUTOMATED COUNT: 13.8 % (ref 12.3–15.4)
GEN 5 2HR TROPONIN T REFLEX: <6 NG/L
GLOBULIN UR ELPH-MCNC: 2.9 GM/DL
GLUCOSE SERPL-MCNC: 136 MG/DL (ref 65–99)
HCT VFR BLD AUTO: 43.5 % (ref 34–46.6)
HGB BLD-MCNC: 14.8 G/DL (ref 12–15.9)
HOLD SPECIMEN: NORMAL
HOLD SPECIMEN: NORMAL
LYMPHOCYTES # BLD MANUAL: 3.5 10*3/MM3 (ref 0.7–3.1)
LYMPHOCYTES NFR BLD MANUAL: 6 % (ref 5–12)
MCH RBC QN AUTO: 30.6 PG (ref 26.6–33)
MCHC RBC AUTO-ENTMCNC: 34 G/DL (ref 31.5–35.7)
MCV RBC AUTO: 90.1 FL (ref 79–97)
METAMYELOCYTES NFR BLD MANUAL: 1 % (ref 0–0)
MONOCYTES # BLD: 0.6 10*3/MM3 (ref 0.1–0.9)
NEUTROPHILS # BLD AUTO: 5.6 10*3/MM3 (ref 1.7–7)
NEUTROPHILS NFR BLD MANUAL: 56 % (ref 42.7–76)
NT-PROBNP SERPL-MCNC: 56.2 PG/ML (ref 0–900)
PLAT MORPH BLD: NORMAL
PLATELET # BLD AUTO: 287 10*3/MM3 (ref 140–450)
PMV BLD AUTO: 9 FL (ref 6–12)
POTASSIUM SERPL-SCNC: 3.3 MMOL/L (ref 3.5–5.2)
PROT SERPL-MCNC: 6.9 G/DL (ref 6–8.5)
RBC # BLD AUTO: 4.83 10*6/MM3 (ref 3.77–5.28)
RBC MORPH BLD: NORMAL
SODIUM SERPL-SCNC: 139 MMOL/L (ref 136–145)
TROPONIN T DELTA: NORMAL
TROPONIN T SERPL HS-MCNC: <6 NG/L
VARIANT LYMPHS NFR BLD MANUAL: 13 % (ref 0–5)
VARIANT LYMPHS NFR BLD MANUAL: 22 % (ref 19.6–45.3)
WBC MORPH BLD: NORMAL
WBC NRBC COR # BLD: 10 10*3/MM3 (ref 3.4–10.8)
WHOLE BLOOD HOLD COAG: NORMAL
WHOLE BLOOD HOLD SPECIMEN: NORMAL

## 2023-02-24 PROCEDURE — 83880 ASSAY OF NATRIURETIC PEPTIDE: CPT | Performed by: FAMILY MEDICINE

## 2023-02-24 PROCEDURE — 93010 ELECTROCARDIOGRAM REPORT: CPT | Performed by: INTERNAL MEDICINE

## 2023-02-24 PROCEDURE — 85025 COMPLETE CBC W/AUTO DIFF WBC: CPT | Performed by: FAMILY MEDICINE

## 2023-02-24 PROCEDURE — 71045 X-RAY EXAM CHEST 1 VIEW: CPT

## 2023-02-24 PROCEDURE — 99284 EMERGENCY DEPT VISIT MOD MDM: CPT

## 2023-02-24 PROCEDURE — 80053 COMPREHEN METABOLIC PANEL: CPT | Performed by: FAMILY MEDICINE

## 2023-02-24 PROCEDURE — 85007 BL SMEAR W/DIFF WBC COUNT: CPT | Performed by: FAMILY MEDICINE

## 2023-02-24 PROCEDURE — 36415 COLL VENOUS BLD VENIPUNCTURE: CPT

## 2023-02-24 PROCEDURE — 84484 ASSAY OF TROPONIN QUANT: CPT | Performed by: FAMILY MEDICINE

## 2023-02-24 PROCEDURE — 93005 ELECTROCARDIOGRAM TRACING: CPT

## 2023-02-24 PROCEDURE — 93005 ELECTROCARDIOGRAM TRACING: CPT | Performed by: FAMILY MEDICINE

## 2023-02-24 RX ORDER — PSEUDOEPHEDRINE HCL 30 MG
60 TABLET ORAL EVERY 4 HOURS PRN
Status: DISCONTINUED | OUTPATIENT
Start: 2023-02-24 | End: 2023-02-25 | Stop reason: HOSPADM

## 2023-02-24 RX ORDER — POTASSIUM CHLORIDE 750 MG/1
40 CAPSULE, EXTENDED RELEASE ORAL ONCE
Status: COMPLETED | OUTPATIENT
Start: 2023-02-24 | End: 2023-02-24

## 2023-02-24 RX ORDER — SODIUM CHLORIDE 0.9 % (FLUSH) 0.9 %
10 SYRINGE (ML) INJECTION AS NEEDED
Status: DISCONTINUED | OUTPATIENT
Start: 2023-02-24 | End: 2023-02-25 | Stop reason: HOSPADM

## 2023-02-24 RX ORDER — PSEUDOEPHEDRINE HCL 30 MG
30 TABLET ORAL EVERY 4 HOURS PRN
Qty: 30 TABLET | Refills: 0 | Status: SHIPPED | OUTPATIENT
Start: 2023-02-24 | End: 2023-03-03

## 2023-02-24 RX ADMIN — POTASSIUM CHLORIDE 40 MEQ: 10 CAPSULE, COATED, EXTENDED RELEASE ORAL at 20:59

## 2023-02-24 RX ADMIN — PSEUDOEPHEDRINE HCL 60 MG: 30 TABLET, FILM COATED ORAL at 21:34

## 2023-02-25 LAB
QT INTERVAL: 406 MS
QTC INTERVAL: 431 MS

## 2023-03-06 ENCOUNTER — OFFICE VISIT (OUTPATIENT)
Dept: CARDIOLOGY | Facility: CLINIC | Age: 53
End: 2023-03-06
Payer: COMMERCIAL

## 2023-03-06 VITALS
HEIGHT: 64 IN | OXYGEN SATURATION: 99 % | BODY MASS INDEX: 23.73 KG/M2 | TEMPERATURE: 97.1 F | WEIGHT: 139 LBS | DIASTOLIC BLOOD PRESSURE: 70 MMHG | HEART RATE: 65 BPM | SYSTOLIC BLOOD PRESSURE: 126 MMHG

## 2023-03-06 DIAGNOSIS — Z72.0 NICOTINE ABUSE: ICD-10-CM

## 2023-03-06 DIAGNOSIS — R07.2 PRECORDIAL PAIN: ICD-10-CM

## 2023-03-06 DIAGNOSIS — R07.89 CHEST PRESSURE: ICD-10-CM

## 2023-03-06 DIAGNOSIS — R06.09 DYSPNEA ON EXERTION: ICD-10-CM

## 2023-03-06 DIAGNOSIS — I10 PRIMARY HYPERTENSION: Primary | ICD-10-CM

## 2023-03-06 DIAGNOSIS — E78.2 MIXED HYPERLIPIDEMIA: ICD-10-CM

## 2023-03-06 DIAGNOSIS — R73.03 PREDIABETES: ICD-10-CM

## 2023-03-06 LAB
QT INTERVAL: 414 MS
QTC INTERVAL: 430 MS

## 2023-03-06 PROCEDURE — 93000 ELECTROCARDIOGRAM COMPLETE: CPT | Performed by: INTERNAL MEDICINE

## 2023-03-06 PROCEDURE — 99204 OFFICE O/P NEW MOD 45 MIN: CPT | Performed by: INTERNAL MEDICINE

## 2023-03-06 RX ORDER — CETIRIZINE HYDROCHLORIDE 10 MG/1
TABLET ORAL
COMMUNITY
Start: 2023-02-07

## 2023-03-06 RX ORDER — ROSUVASTATIN CALCIUM 10 MG/1
10 TABLET, COATED ORAL DAILY
Qty: 90 TABLET | Refills: 3 | Status: SHIPPED | OUTPATIENT
Start: 2023-03-06

## 2023-03-06 RX ORDER — PREDNISONE 50 MG/1
50 TABLET ORAL DAILY
Qty: 3 TABLET | Refills: 0 | Status: SHIPPED | OUTPATIENT
Start: 2023-03-06

## 2023-03-06 RX ORDER — LEVOFLOXACIN 500 MG/1
TABLET, FILM COATED ORAL
COMMUNITY
Start: 2023-03-01

## 2023-03-06 RX ORDER — BUSPIRONE HYDROCHLORIDE 10 MG/1
TABLET ORAL
COMMUNITY
Start: 2023-02-06

## 2023-03-06 RX ORDER — MOXIFLOXACIN 5 MG/ML
SOLUTION/ DROPS OPHTHALMIC
COMMUNITY
Start: 2023-02-07

## 2023-03-06 RX ORDER — SPIRONOLACTONE 25 MG/1
TABLET ORAL
COMMUNITY
Start: 2023-02-06

## 2023-03-06 RX ORDER — TIZANIDINE 4 MG/1
TABLET ORAL
COMMUNITY
Start: 2023-02-06

## 2023-03-06 NOTE — PROGRESS NOTES
Vielka Pratt  53 y.o. female    03/06/2023  1. Primary hypertension    2. Mixed hyperlipidemia    3. Nicotine abuse    4. Prediabetes    5. Chest pressure    6. Precordial pain    7. Dyspnea on exertion        History of Present Illness  Vielka Pratt is a 53-year-old female who is referred by Dr. Neal for evaluation of intermittent episodes of chest pain.  She was seen in the emergency room on 2/24/2023 for similar symptoms and EKG showed no acute ST-T wave changes and cardiac enzymes were negative for myocardial injury.  The patient indicates that she has had intermittent episodes of chest pain since end of last year and the pain has occurred both at rest and with activity.  It feels like a pressure at times but has had pain sometimes going down both arms.  She has had NYHA class II dyspnea on exertion which apparently is a chronic symptom.  She has no previous documented coronary artery disease and on review of history she has been evaluated for chest pain in the past and underwent an exercise treadmill stress test in 2018 which was within normal limits.    She has multiple risk factors for coronary artery disease including longstanding tobacco use, positive family history for CAD, hypertension, prediabetes.  She was noted to have an LDL of 160 recently.  She has been treated for migraine headaches, IBS, depression, adrenal hyperactivity/hirsutism, alopecia, abnormal thyroid function test.    EKG today showed sinus rhythm with heart rate of 65 bpm.  Poor R wave progression anteroseptal leads.  No ST-T wave changes diagnostic of ischemia.    SUBJECTIVE    Allergies   Allergen Reactions   • Meat Extract Other (See Comments)     Beef,pork,lamb. All mammal meat  Causes pain   • Contrast Dye (Echo Or Unknown Ct/Mr) Hives   • Elemental Sulfur Rash   • Iodinated Contrast Media Hives and Rash   • Iron Nausea And Vomiting   • Naproxen Rash   • Penicillins Rash   • Sulfa Antibiotics Rash   • Tetracyclines &  Related Rash         Past Medical History:   Diagnosis Date   • Abnormal granulation tissue    • Acute bacterial pharyngitis    • Acute tonsillitis, unspecified    • Allergic asthma     IgE-mediated allergic asthma      • Allergic rhinitis due to pollen    • Anxiety    • Asthma    • Back pain    • Backache    • Breast lump    • Cataracts, bilateral    • Cellulitis of trunk     Postoperative   • Chronic pain    • Corns and callus    • Cough    • Depressive disorder    • Diarrhea    • Dysphagia    • Dysuria    • Endometriosis    • Family history of malignant neoplasm of gastrointestinal tract    • GERD (gastroesophageal reflux disease)    • Hirsutism    • Hypertension    • Irritable bowel syndrome    • Leukorrhea, not specified as infective    • Mastitis    • Migraine    • Sebaceous cyst    • Shortness of breath    • Tick bite    • Tobacco abuse disorder 7/6/2017   • Tobacco user    • Upper respiratory infection          Past Surgical History:   Procedure Laterality Date   • CATARACT EXTRACTION, BILATERAL     • CHOLECYSTECTOMY     • COLONOSCOPY N/A 4/2/2018    Procedure: COLONOSCOPY;  Surgeon: Van Romano DO;  Location: [facility] ENDOSCOPY;  Service: Gastroenterology   • DIAGNOSTIC LAPAROSCOPY  04/08/2008    Laparosc diagnostic (2)      • ENDOSCOPY  03/26/2013    Colon endoscopy 56130 (2)      • ENDOSCOPY N/A 4/2/2018    Procedure: ESOPHAGOGASTRODUODENOSCOPY;  Surgeon: Van Romano DO;  Location: [facility] ENDOSCOPY;  Service: Gastroenterology   • ENDOSCOPY W/ PEG TUBE PLACEMENT  03/26/2013    EGD w/ tube 15773 (2)      • EXCISION LESION  01/30/2013    EXC TR-EXT Benign+Irina 0.6-1 CM 99931 (1)      • HAND TENOLYSIS  07/23/1992    Hand/finger surgery (1)      • HEAD & NECK SKIN LESION EXCISIONAL BIOPSY  01/31/1994    Biopsy of Skin 77771 (2)      • INCISION AND DRAINAGE BREAST ABSCESS  12/03/2012    Anesth, surgery of breast (1)      • INJECTION OF MEDICATION  09/12/2011    B12 (1)      • INJECTION OF MEDICATION   04/28/2015    Celestone (betamethasone) (1)     • INJECTION OF MEDICATION  08/17/2015    Depo Medrol (Methylprednisone) (6)      • INJECTION OF MEDICATION  06/08/2012    Kenalog (3)      • INJECTION OF MEDICATION  06/08/2012    Toradol (1)      • NASOLACRIMAL DUCT PROBING  08/16/1973    Probe nasolacrimal duct (1)      • NEPHROSTOMY TRACT DILATATION W/ LITHOTRIPSY     • PAP SMEAR  03/20/2008    PAP SMEAR (1)      • TOTAL ABDOMINAL HYSTERECTOMY WITH SALPINGO OOPHORECTOMY  04/09/2012    ERLIN/BSO (1)      • US GUIDED FINE NEEDLE ASPIRATION  6/26/2020   • US GUIDED FINE NEEDLE ASPIRATION  7/17/2020         Family History   Problem Relation Age of Onset   • Cancer Other         other   • Colon cancer Other         Colorectal Cancer   • Diabetes Other    • Heart disease Other    • Hypertension Other    • Lung cancer Other    • Hypertension Mother    • Diabetes Brother    • Lung cancer Father    • Cancer Paternal Aunt          Social History     Socioeconomic History   • Marital status:    Tobacco Use   • Smoking status: Every Day     Packs/day: 0.50     Types: Cigarettes   • Smokeless tobacco: Never   Vaping Use   • Vaping Use: Every day   • Substances: Nicotine   Substance and Sexual Activity   • Alcohol use: Not Currently   • Drug use: No         Current Outpatient Medications   Medication Sig Dispense Refill   • albuterol (VENTOLIN HFA) 108 (90 Base) MCG/ACT inhaler 2 puffs every 4 hours as needed for breathing 1 inhaler 5   • azelastine (ASTELIN) 0.1 % nasal spray 2 sprays into the nostril(s) as directed by provider 2 (Two) Times a Day. Use in each nostril as directed 30 mL 11   • Biotin 1000 MCG tablet Take 1,000 mcg by mouth.     • busPIRone (BUSPAR) 10 MG tablet      • carvedilol (COREG) 12.5 MG tablet Take 1 tablet by mouth 2 (Two) Times a Day With Meals.     • cetirizine (zyrTEC) 10 MG tablet      • EPINEPHrine (EPIPEN) 0.3 MG/0.3ML solution auto-injector injection Inject 0.3 mg into the appropriate muscle  as directed by prescriber.     • escitalopram (LEXAPRO) 20 MG tablet TAKE 1 TABLET BY MOUTH DAILY     • estradiol (ESTRACE) 0.1 MG/GM vaginal cream INSERT 1/2 OF AN APPLICATOR FULL VAGINALLY 2 TIMES A WEEK     • fluorometholone (FML) 0.1 % ophthalmic suspension      • guaiFENesin (MUCINEX) 600 MG 12 hr tablet Take 1 tablet by mouth.     • HYDROcodone-acetaminophen (NORCO)  MG per tablet Take 1 tablet by mouth 3 (Three) Times a Day.     • ibuprofen (ADVIL,MOTRIN) 800 MG tablet Take 1 tablet by mouth As Needed.     • levoFLOXacin (LEVAQUIN) 500 MG tablet      • metFORMIN (GLUCOPHAGE) 500 MG tablet TAKE 1 TABLET BY MOUTH TWICE DAILY WITH MEALS     • montelukast (SINGULAIR) 10 MG tablet Take 1 tablet by mouth Every Night.     • moxifloxacin (VIGAMOX) 0.5 % ophthalmic solution      • nortriptyline (PAMELOR) 10 MG capsule      • ondansetron (ZOFRAN) 4 MG tablet Take 1 tablet by mouth Every 6 (Six) Hours As Needed for Nausea or Vomiting. 10 tablet 0   • pantoprazole (PROTONIX) 40 MG EC tablet TAKE 1 TABLET EVERY MORNING BEFORE BREAKFAST     • PROAIR  (90 BASE) MCG/ACT inhaler      • propylthiouracil (PTU) 50 MG tablet Take 1 tablet by mouth 3 (Three) Times a Day.     • spironolactone (ALDACTONE) 25 MG tablet      • SUMAtriptan (IMITREX) 50 MG tablet Take 1 tablet by mouth.     • tiZANidine (ZANAFLEX) 4 MG tablet      • TROKENDI  MG capsule sustained-release 24 hr Take 1 tablet by mouth Daily.     • diphenhydrAMINE (BENADRYL) 50 MG tablet Take 1 tablet by mouth At Night As Needed for Itching. 1 tablet 0   • meloxicam (MOBIC) 15 MG tablet 1 PO Daily with food. 30 tablet 3   • predniSONE (DELTASONE) 50 MG tablet Take 1 tablet by mouth Daily. 3 tablet 0   • rosuvastatin (CRESTOR) 10 MG tablet Take 1 tablet by mouth Daily. 90 tablet 3     Current Facility-Administered Medications   Medication Dose Route Frequency Provider Last Rate Last Admin   • methylPREDNISolone acetate (DEPO-medrol) injection 80 mg  80 mg  "Intramuscular Once Percy Sanders MD             OBJECTIVE    /70   Pulse 65   Temp 97.1 °F (36.2 °C)   Ht 162.6 cm (64\")   Wt 63 kg (139 lb)   SpO2 99%   BMI 23.86 kg/m²         Review of Systems     Constitutional:  Denies recent weight loss, weight gain, fever or chills     HENT:  Denies any hearing loss, epistaxis, hoarseness, or difficulty speaking.     Eyes: Wears eyeglasses or contact lenses     Respiratory:  Dyspnea with exertion, no cough, wheezing, or hemoptysis.  History of sleep apnea    Cardiovascular: See HPI    Gastrointestinal:  Denies change in bowel habits, dyspepsia, ulcer disease, hematochezia, or melena.  History of IBS    Endocrine: Negative for cold intolerance, heat intolerance, polydipsia, polyphagia and polyuria.  History of abnormal thyroid function test, hirsutism, adrenal hyperactivity, prediabetes, hyperlipidemia    Genitourinary: Negative.      Musculoskeletal: DJD    Skin:  Denies any change in hair or nails, rashes, or skin lesions.     Allergic/Immunologic: Negative.  Negative for environmental allergies, food allergies and/or immunocompromised state.     Neurological: History of migraine headaches.  No seizures, TIA    Hematological: Denies any food allergies, seasonal allergies, bleeding disorders, or lymphadenopathy.     Psychiatric/Behavioral:  history of depression, no substance abuse, or change in cognitive function.       Physical Exam     Constitutional: Cooperative, alert and oriented, in no acute distress.     HENT:   Head: Normocephalic, normal hair patterns, no masses or tenderness.  Ears, Nose, and Throat: No gross abnormalities. No pallor or cyanosis. Dentition good.   Eyes: EOMS intact, PERRL, conjunctivae and lids unremarkable. Fundoscopic exam and visual fields not performed.   Neck: No palpable masses or adenopathy, no thyromegaly, no JVD, carotid pulses are full and equal bilaterally and without bruit.     Cardiovascular: Regular rhythm, S1 and S2 " normal, no S3 or S4.  No murmurs, gallops, or rubs detected.     Pulmonary/Chest: Chest: normal symmetry, normal respiratory excursion, no intercostal retraction, no use of accessory muscles.     Pulmonary: Normal breath sounds. No rales or rhonchi.    Abdominal: Abdomen soft, bowel sounds normoactive, no masses, no hepatosplenomegaly, nontender, no bruit.     Musculoskeletal: No deformities, clubbing, cyanosis, erythema, or edema observed.     Neurological: No gross motor or sensory deficits noted, affect appropriate, oriented to time, person, place.     Skin: Warm and dry to the touch, no apparent skin lesion or mass noted.     Psychiatric: She has a normal mood and affect. Her behavior is normal. Judgment and thought content normal.         Procedures      Lab Results   Component Value Date    WBC 10.00 02/24/2023    HGB 14.8 02/24/2023    HCT 43.5 02/24/2023    MCV 90.1 02/24/2023     02/24/2023     Lab Results   Component Value Date    GLUCOSE 136 (H) 02/24/2023    BUN 11 02/24/2023    CREATININE 0.65 02/24/2023    EGFRIFNONA 95 02/04/2019    EGFRIFAFRI 81 06/26/2021    BCR 16.9 02/24/2023    CO2 22.0 02/24/2023    CALCIUM 8.9 02/24/2023    ALBUMIN 4.0 02/24/2023    AST 13 02/24/2023    ALT 20 02/24/2023     Lab Results   Component Value Date    CHOL 201 (H) 06/12/2017     Lab Results   Component Value Date    TRIG 118 02/24/2023    TRIG 331 (H) 07/22/2022    TRIG 97 06/26/2021     Lab Results   Component Value Date    HDL 52 02/24/2023    HDL 43 07/22/2022    HDL 40 06/26/2021     No components found for: LDLCALC  Lab Results   Component Value Date     02/24/2023     07/22/2022     06/26/2021     No results found for: HDLLDLRATIO  No components found for: CHOLHDL  Lab Results   Component Value Date    HGBA1C 6.3 (H) 02/24/2023     Lab Results   Component Value Date    TSH 0.80 08/20/2021    C5HOUXJ 138 08/20/2021    U0GXOFA 8.1 02/21/2019    THYROIDAB 21 07/06/2017            ASSESSMENT AND PLAN  Vielka Pratt is a 53-year-old female with multiple medical issues as discussed in detail and history of present illness, risk factors for coronary artery disease including positive family history for CAD, tobacco use, hypertension, diabetes mellitus, hyperlipidemia, who is being evaluated for intermittent chest pain for the past 3 to 4 months, dyspnea on exertion.  The patient is very concerned about underlying coronary artery disease.  She has had 1 recent visit to the emergency room.    To further evaluate her symptoms the plan will be to proceed with a CT angiogram of the coronary arteries to definitively rule out CAD.  She is allergic to iodine containing contrast and hence premedication will be administered as per protocol.  An echocardiogram will be performed to assess left ventricular and valvular function.    No changes in medications have been made other than addition of Crestor 10 mg daily for optimization of LDL.  Carvedilol, Aldactone have been continued for now.  She is on antiplatelet therapy with aspirin.  Further recommendations will follow.  Thank you for asked me to see this patient.  Aggressive risk factor modification and smoking cessation stressed.  We had a discussion about appropriate diet and Mediterranean diet has been recommended.      Diagnoses and all orders for this visit:    1. Primary hypertension (Primary)  -     ECG 12 Lead  -     CT Angiogram Coronary; Future  -     Adult Transthoracic Echo Complete w/ Color, Spectral and Contrast if Necessary Per Protocol; Future    2. Mixed hyperlipidemia  -     CT Angiogram Coronary; Future    3. Nicotine abuse  -     CT Angiogram Coronary; Future    4. Prediabetes  -     CT Angiogram Coronary; Future    5. Chest pressure  -     CT Angiogram Coronary; Future    6. Precordial pain  -     CT Angiogram Coronary; Future  -     Adult Transthoracic Echo Complete w/ Color, Spectral and Contrast if Necessary Per  Protocol; Future    7. Dyspnea on exertion  -     CT Angiogram Coronary; Future  -     Adult Transthoracic Echo Complete w/ Color, Spectral and Contrast if Necessary Per Protocol; Future    Other orders  -     rosuvastatin (CRESTOR) 10 MG tablet; Take 1 tablet by mouth Daily.  Dispense: 90 tablet; Refill: 3  -     predniSONE (DELTASONE) 50 MG tablet; Take 1 tablet by mouth Daily.  Dispense: 3 tablet; Refill: 0  -     diphenhydrAMINE (BENADRYL) 50 MG tablet; Take 1 tablet by mouth At Night As Needed for Itching.  Dispense: 1 tablet; Refill: 0        BMI is within normal parameters. No other follow-up for BMI required.      Vielka Pratt  reports that she has been smoking cigarettes. She has been smoking an average of .5 packs per day. She has never used smokeless tobacco.. I have educated her on the risk of diseases from using tobacco products such as cancer, COPD and heart disease.     I spent 3  minutes counseling the patient.               Gopi Rodriguez MD  3/6/2023  08:48 CST

## 2023-04-14 ENCOUNTER — TRANSCRIBE ORDERS (OUTPATIENT)
Dept: GENERAL RADIOLOGY | Facility: HOSPITAL | Age: 53
End: 2023-04-14
Payer: COMMERCIAL

## 2023-04-14 DIAGNOSIS — I10 PRIMARY HYPERTENSION: Primary | ICD-10-CM

## 2023-04-18 ENCOUNTER — LAB (OUTPATIENT)
Dept: LAB | Facility: HOSPITAL | Age: 53
End: 2023-04-18
Payer: COMMERCIAL

## 2023-04-18 ENCOUNTER — HOSPITAL ENCOUNTER (OUTPATIENT)
Dept: CT IMAGING | Facility: HOSPITAL | Age: 53
Discharge: HOME | End: 2023-04-18
Payer: COMMERCIAL

## 2023-04-18 VITALS
OXYGEN SATURATION: 98 % | DIASTOLIC BLOOD PRESSURE: 64 MMHG | HEART RATE: 71 BPM | RESPIRATION RATE: 20 BRPM | SYSTOLIC BLOOD PRESSURE: 117 MMHG

## 2023-04-18 DIAGNOSIS — E78.2 MIXED HYPERLIPIDEMIA: ICD-10-CM

## 2023-04-18 DIAGNOSIS — I10 PRIMARY HYPERTENSION: ICD-10-CM

## 2023-04-18 DIAGNOSIS — R07.2 PRECORDIAL PAIN: ICD-10-CM

## 2023-04-18 DIAGNOSIS — R06.09 DYSPNEA ON EXERTION: ICD-10-CM

## 2023-04-18 DIAGNOSIS — R73.03 PREDIABETES: ICD-10-CM

## 2023-04-18 DIAGNOSIS — R07.89 CHEST PRESSURE: ICD-10-CM

## 2023-04-18 DIAGNOSIS — Z72.0 NICOTINE ABUSE: ICD-10-CM

## 2023-04-18 LAB
BUN SERPL-MCNC: 15 MG/DL (ref 6–20)
CREAT SERPL-MCNC: 0.87 MG/DL (ref 0.57–1)
EGFRCR SERPLBLD CKD-EPI 2021: 80.8 ML/MIN/1.73

## 2023-04-18 PROCEDURE — 25510000001 IOPAMIDOL PER 1 ML: Performed by: INTERNAL MEDICINE

## 2023-04-18 PROCEDURE — 75574 CT ANGIO HRT W/3D IMAGE: CPT

## 2023-04-18 PROCEDURE — 36415 COLL VENOUS BLD VENIPUNCTURE: CPT

## 2023-04-18 PROCEDURE — 84520 ASSAY OF UREA NITROGEN: CPT

## 2023-04-18 PROCEDURE — 82565 ASSAY OF CREATININE: CPT

## 2023-04-18 RX ORDER — NITROGLYCERIN 0.4 MG/1
TABLET SUBLINGUAL
Status: DISPENSED
Start: 2023-04-18 | End: 2023-04-18

## 2023-04-18 RX ORDER — NITROGLYCERIN 0.4 MG/1
TABLET SUBLINGUAL AS NEEDED
Status: COMPLETED | OUTPATIENT
Start: 2023-04-18 | End: 2023-04-18

## 2023-04-18 RX ADMIN — NITROGLYCERIN 0.4 MG: 0.4 TABLET, ORALLY DISINTEGRATING SUBLINGUAL at 08:24

## 2023-04-18 RX ADMIN — IOPAMIDOL 90 ML: 755 INJECTION, SOLUTION INTRAVENOUS at 08:27

## 2023-04-20 ENCOUNTER — TELEPHONE (OUTPATIENT)
Dept: CARDIOLOGY | Facility: CLINIC | Age: 53
End: 2023-04-20
Payer: COMMERCIAL

## 2023-04-20 NOTE — TELEPHONE ENCOUNTER
Per Dr Matson  Notified and understood----- Message from Gopi Rodriguez MD sent at 4/19/2023  6:37 PM CDT -----  CT angiogram of the coronaries within normal limits. No blockages  ----- Message -----  From: Interface, Rad Results [place] In  Sent: 4/18/2023   5:07 PM CDT  To: Gopi Rodriguez MD

## 2023-06-13 ENCOUNTER — TELEPHONE (OUTPATIENT)
Dept: CARDIOLOGY | Facility: CLINIC | Age: 53
End: 2023-06-13
Payer: COMMERCIAL

## 2023-06-13 NOTE — TELEPHONE ENCOUNTER
Per Dr Matson  Notified and understood ----- Message from Gopi Rodriguez MD sent at 6/12/2023  7:42 AM CDT -----  Echocardiogram within normal limits  ----- Message -----  From: Gopi Rodriguez MD  Sent: 6/11/2023  10:22 AM CDT  To: Gopi Rodriguez MD

## 2023-08-17 ENCOUNTER — LAB (OUTPATIENT)
Dept: LAB | Facility: HOSPITAL | Age: 53
End: 2023-08-17
Payer: COMMERCIAL

## 2023-08-17 ENCOUNTER — OFFICE VISIT (OUTPATIENT)
Dept: FAMILY MEDICINE CLINIC | Facility: CLINIC | Age: 53
End: 2023-08-17
Payer: COMMERCIAL

## 2023-08-17 VITALS
HEART RATE: 77 BPM | HEIGHT: 64 IN | SYSTOLIC BLOOD PRESSURE: 110 MMHG | TEMPERATURE: 97.7 F | OXYGEN SATURATION: 98 % | WEIGHT: 139 LBS | DIASTOLIC BLOOD PRESSURE: 74 MMHG | BODY MASS INDEX: 23.73 KG/M2

## 2023-08-17 DIAGNOSIS — M79.645 PAIN OF FINGER OF LEFT HAND: Primary | ICD-10-CM

## 2023-08-17 DIAGNOSIS — R21 RASH: ICD-10-CM

## 2023-08-17 PROCEDURE — 80048 BASIC METABOLIC PNL TOTAL CA: CPT | Performed by: NURSE PRACTITIONER

## 2023-08-17 PROCEDURE — 84550 ASSAY OF BLOOD/URIC ACID: CPT | Performed by: NURSE PRACTITIONER

## 2023-08-17 RX ORDER — TRIAMCINOLONE ACETONIDE 40 MG/ML
80 INJECTION, SUSPENSION INTRA-ARTICULAR; INTRAMUSCULAR ONCE
Status: COMPLETED | OUTPATIENT
Start: 2023-08-17 | End: 2023-08-17

## 2023-08-17 RX ADMIN — TRIAMCINOLONE ACETONIDE 80 MG: 40 INJECTION, SUSPENSION INTRA-ARTICULAR; INTRAMUSCULAR at 12:05

## 2023-08-17 NOTE — PROGRESS NOTES
Injection  Injection performed in office by Sheila Blackman MA. Patient tolerated the procedure well without complications.  08/17/23   Sheila Blackman MA

## 2023-08-17 NOTE — PROGRESS NOTES
"Subjective   Vielka Pratt is a 53 y.o. female. Left hand pain    History of Present Illness   Patient presents today for left hand pain. Symptoms started a few days ago. She says initially \"little dots came up on my pointer finger\" then she says left pointer finger swelled and started hurting. She says even when her sheets rubbed it, it would hurt. Ibuprofen did not provide any pain relief.     The following portions of the patient's history were reviewed and updated as appropriate: allergies, current medications, past family history, past medical history, past social history, past surgical history, and problem list.    Review of Systems   Constitutional:  Negative for chills, fatigue and fever.   HENT:  Negative for congestion, ear pain, rhinorrhea and sore throat.    Eyes:  Negative for blurred vision, double vision and visual disturbance.   Respiratory:  Negative for cough, chest tightness, shortness of breath and wheezing.    Cardiovascular:  Negative for chest pain, palpitations and leg swelling.   Gastrointestinal:  Negative for abdominal pain, diarrhea, nausea and vomiting.   Endocrine: Negative for cold intolerance and heat intolerance.   Genitourinary:  Negative for difficulty urinating, dysuria, frequency and hematuria.   Musculoskeletal:  Positive for arthralgias and joint swelling. Negative for back pain, neck pain and neck stiffness.   Skin:  Negative for dry skin, pallor, rash, skin lesions and wound.   Allergic/Immunologic: Negative for environmental allergies, food allergies and immunocompromised state.   Neurological:  Negative for dizziness, syncope, weakness, light-headedness, headache and confusion.   Hematological:  Negative for adenopathy. Does not bruise/bleed easily.   Psychiatric/Behavioral:  Negative for self-injury, sleep disturbance, suicidal ideas, depressed mood and stress. The patient is not nervous/anxious.      Vitals:    08/17/23 1140   BP: 110/74   Pulse: 77   Temp: 97.7 " øF (36.5 øC)   SpO2: 98%     Body mass index is 23.84 kg/mý.    Objective   Physical Exam  Constitutional:       General: She is not in acute distress.     Appearance: She is well-developed. She is not ill-appearing or diaphoretic.   HENT:      Head: Normocephalic.   Eyes:      General: Lids are normal.      Conjunctiva/sclera: Conjunctivae normal.      Pupils: Pupils are equal, round, and reactive to light.   Musculoskeletal:         General: Normal range of motion.      Left hand: Swelling and tenderness present.        Arms:       Cervical back: Full passive range of motion without pain, normal range of motion and neck supple.   Skin:     General: Skin is warm and dry.      Coloration: Skin is not pale.   Neurological:      Mental Status: She is alert and oriented to person, place, and time.      Coordination: Coordination normal.      Gait: Gait normal.   Psychiatric:         Speech: Speech normal.         Behavior: Behavior normal. Behavior is cooperative.         Thought Content: Thought content normal.         Judgment: Judgment normal.         Assessment & Plan   Diagnoses and all orders for this visit:    1. Pain of finger of left hand (Primary)  -     Uric acid  -     Basic metabolic panel  -     triamcinolone acetonide (KENALOG-40) injection 80 mg    2. Rash  -     triamcinolone (KENALOG) 0.1 % ointment; Apply 1 application  topically to the appropriate area as directed 2 (Two) Times a Day.  Dispense: 30 g; Refill: 0      I suspect gout.  Kenalog 80 mg given IM for finger swelling/pain.  Uric acid and BMP ordered to further evaluate.  She can apply kenalog ointment to rash as directed until gone.    If symptoms do not improve or worsen, patient was instructed to return to clinic for further evaluation.         This document has been electronically signed by ALISSON Alvarado on  August 17, 2023 14:26 CDT

## 2023-08-18 LAB
ANION GAP SERPL CALCULATED.3IONS-SCNC: 12 MMOL/L (ref 5–15)
BUN SERPL-MCNC: 9 MG/DL (ref 6–20)
BUN/CREAT SERPL: 10.8 (ref 7–25)
CALCIUM SPEC-SCNC: 10 MG/DL (ref 8.6–10.5)
CHLORIDE SERPL-SCNC: 105 MMOL/L (ref 98–107)
CO2 SERPL-SCNC: 21 MMOL/L (ref 22–29)
CREAT SERPL-MCNC: 0.83 MG/DL (ref 0.57–1)
EGFRCR SERPLBLD CKD-EPI 2021: 85.5 ML/MIN/1.73
GLUCOSE SERPL-MCNC: 93 MG/DL (ref 65–99)
POTASSIUM SERPL-SCNC: 4.6 MMOL/L (ref 3.5–5.2)
SODIUM SERPL-SCNC: 138 MMOL/L (ref 136–145)
URATE SERPL-MCNC: 5.2 MG/DL (ref 2.4–5.7)

## 2023-09-13 ENCOUNTER — TELEPHONE (OUTPATIENT)
Dept: INTERNAL MEDICINE | Facility: CLINIC | Age: 53
End: 2023-09-13

## 2023-09-13 ENCOUNTER — PATIENT ROUNDING (BHMG ONLY) (OUTPATIENT)
Dept: INTERNAL MEDICINE | Facility: CLINIC | Age: 53
End: 2023-09-13

## 2023-09-13 ENCOUNTER — OFFICE VISIT (OUTPATIENT)
Dept: INTERNAL MEDICINE | Facility: CLINIC | Age: 53
End: 2023-09-13
Payer: COMMERCIAL

## 2023-09-13 VITALS
WEIGHT: 139.4 LBS | HEIGHT: 64 IN | DIASTOLIC BLOOD PRESSURE: 84 MMHG | BODY MASS INDEX: 23.8 KG/M2 | TEMPERATURE: 97.7 F | HEART RATE: 69 BPM | OXYGEN SATURATION: 98 % | SYSTOLIC BLOOD PRESSURE: 137 MMHG

## 2023-09-13 DIAGNOSIS — Z01.419 ENCOUNTER FOR GYNECOLOGICAL EXAMINATION WITHOUT ABNORMAL FINDING: ICD-10-CM

## 2023-09-13 DIAGNOSIS — E11.9 TYPE 2 DIABETES MELLITUS WITHOUT COMPLICATION, WITHOUT LONG-TERM CURRENT USE OF INSULIN: ICD-10-CM

## 2023-09-13 DIAGNOSIS — Z91.09 ENVIRONMENTAL ALLERGIES: Primary | ICD-10-CM

## 2023-09-13 DIAGNOSIS — R94.6 ABNORMAL THYROID FUNCTION TEST: ICD-10-CM

## 2023-09-13 DIAGNOSIS — E87.70 HYPERVOLEMIA, UNSPECIFIED HYPERVOLEMIA TYPE: ICD-10-CM

## 2023-09-13 PROBLEM — R73.03 PREDIABETES: Status: RESOLVED | Noted: 2017-07-06 | Resolved: 2023-09-13

## 2023-09-13 RX ORDER — FLUTICASONE PROPIONATE 50 MCG
2 SPRAY, SUSPENSION (ML) NASAL DAILY
Qty: 16 G | Refills: 11 | Status: SHIPPED | OUTPATIENT
Start: 2023-09-13

## 2023-09-13 RX ORDER — ERGOCALCIFEROL 1.25 MG/1
50000 CAPSULE ORAL WEEKLY
Qty: 5 CAPSULE | Refills: 11 | Status: SHIPPED | OUTPATIENT
Start: 2023-09-13

## 2023-09-13 RX ORDER — SPIRONOLACTONE 25 MG/1
25 TABLET ORAL DAILY
Qty: 90 TABLET | Refills: 3 | Status: SHIPPED | OUTPATIENT
Start: 2023-09-13

## 2023-09-13 NOTE — PROGRESS NOTES
September 13, 2023    Hello, may I speak with Vielka Pratt?    My name is Verena      I am  with [facility] [place]  [facility]  [address] [place] [address] [phone].    Before we get started may I verify your date of birth? 1970    I am calling to officially welcome you to our practice and ask about your recent visit. Is this a good time to talk? Yes    Tell me about your visit with us. What things went well?  I've been a patient of Dr. Neal for many years and followed him to [place].  My Mother and I had a very good visit.  Everything went great.        We're always looking for ways to make our patients' experiences even better. Do you have recommendations on ways we may improve?  No    Overall were you satisfied with your first visit to our practice? Yes, very satisfied       I appreciate you taking the time to speak with me today. Is there anything else I can do for you? No      Thank you, and have a great day.

## 2023-09-13 NOTE — PROGRESS NOTES
"Chief Complaint  Hypertension and Hyperlipidemia    Subjective        Vielka Pratt presents to [facility]  History of Present Illness    Vielka Pratt is a 53 y.o. female who presents for a routine visit at this time.  Patient comes in secondary to multiple medical issues.  Patient states that her allergies have remained well controlled on her Flonase and she would like to get refills at this time she also needs refills on her Aldactone for hypovolemia states that the underlying issue is stable and doing well with her current dosage she states that her diabetes remains well controlled would like to go ahead and get set up for diabetic labs at this time.  Patient is also due to recheck her thyroid as she has had abnormal thyroid test in the past.  Patient would like to get a referral for a Pap smear at this time she would rather prefer to go outside of [facility] to her previous provider Dr. Crockett in [place] we will set that up for her today and would also like refills of her vitamin D which she states helps with these issues.  We had previously seen this patient for pain management but she is currently following up with the pain management clinic in [place].  States that they are in doing injections and that this is working very well for her.  She may need to return in the future for us to pain management for us to do her pain management if this is too expensive.    Objective   Vital Signs:  /84 (BP Location: Left arm, Patient Position: Sitting, Cuff Size: Adult)   Pulse 69   Temp 97.7 °F (36.5 °C) (Infrared)   Ht 162.6 cm (64\")   Wt 63.2 kg (139 lb 6.4 oz)   SpO2 98%   BMI 23.93 kg/m²   Estimated body mass index is 23.93 kg/m² as calculated from the following:    Height as of this encounter: 162.6 cm (64\").    Weight as of this encounter: 63.2 kg (139 lb 6.4 oz).       BMI is within normal parameters. No other follow-up for BMI " required.      Physical Exam  Vitals and nursing note reviewed.   Constitutional:       General: She is not in acute distress.     Appearance: Normal appearance.   HENT:      Head: Normocephalic.   Eyes:      Extraocular Movements: Extraocular movements intact.      Pupils: Pupils are equal, round, and reactive to light.   Cardiovascular:      Rate and Rhythm: Normal rate and regular rhythm.      Heart sounds: Normal heart sounds. No murmur heard.  Pulmonary:      Effort: Pulmonary effort is normal. No respiratory distress.      Breath sounds: Normal breath sounds. No rhonchi or rales.   Abdominal:      General: Abdomen is flat. Bowel sounds are normal.      Palpations: Abdomen is soft.   Neurological:      General: No focal deficit present.      Mental Status: She is alert.      Result Review :  The following data was reviewed by: Nick Neal MD on 09/13/2023:    A1C Last 3 Results          2/24/2023    08:34 6/16/2023    11:08   HGBA1C Last 3 Results   Hemoglobin A1C 6.3     6.4          Details          This result is from an external source.                          Assessment and Plan   Diagnoses and all orders for this visit:    1. Environmental allergies (Primary)  -     fluticasone (FLONASE) 50 MCG/ACT nasal spray; 2 sprays into the nostril(s) as directed by provider Daily.  Dispense: 16 g; Refill: 11    2. Hypervolemia, unspecified hypervolemia type  -     spironolactone (ALDACTONE) 25 MG tablet; Take 1 tablet by mouth Daily.  Dispense: 90 tablet; Refill: 3    3. Type 2 diabetes mellitus without complication, without long-term current use of insulin  -     Comprehensive Metabolic Panel; Future  -     Hemoglobin A1c; Future    4. Abnormal thyroid function test  -     TSH; Future    5. Encounter for gynecological examination without abnormal finding  -     Ambulatory Referral to Obstetrics / Gynecology    Other orders  -     vitamin D (ERGOCALCIFEROL) 1.25 MG (22704 UT) capsule capsule; Take 1  capsule by mouth 1 (One) Time Per Week.  Dispense: 5 capsule; Refill: 11      EMR Dictation/Transcription disclaimer:   Some of this note may be an electronic transcription/translation of spoken language to printed text. The electronic translation of spoken language may permit erroneous, or at times, nonsensical words or phrases to be inadvertently transcribed; Although I have reviewed the note for such errors, some may still exist.          Follow Up   No follow-ups on file.  Patient was given instructions and counseling regarding her condition or for health maintenance advice. Please see specific information pulled into the AVS if appropriate.

## 2023-09-13 NOTE — TELEPHONE ENCOUNTER
I called pt to do pt rounding for her mom and she said the medication they show on after visit summary isn't correct.  She will call back to provide what her mother takes.  Below is the meds pt currently takes.  Please take all others off her list.    ADVARE INHALER    fluticasone (FLONASE) 50 MCG/ACT nasal spray [86324] (Order [id number])     spironolactone (ALDACTONE) 25 MG tablet [[id number]] (Order [id number])     CARDEDILOL 12.5MG FOR BP    METFORMIN     busPIRone (BUSPAR) 10 MG tablet [[id number]] (Order [id number])     TROKENDI XR    CRESTOR     SINGULAIR     LEXAPRO    PROTONIX    EYE DROPS: FLUOROMETHOL

## 2023-09-14 LAB
ALBUMIN SERPL-MCNC: 4.7 G/DL (ref 3.8–4.9)
ALBUMIN/GLOB SERPL: 1.9 {RATIO} (ref 1.2–2.2)
ALP SERPL-CCNC: 84 IU/L (ref 44–121)
ALT SERPL-CCNC: 17 IU/L (ref 0–32)
AST SERPL-CCNC: 13 IU/L (ref 0–40)
BILIRUB SERPL-MCNC: 0.3 MG/DL (ref 0–1.2)
BUN SERPL-MCNC: 11 MG/DL (ref 6–24)
BUN/CREAT SERPL: 14 (ref 9–23)
CALCIUM SERPL-MCNC: 9.9 MG/DL (ref 8.7–10.2)
CHLORIDE SERPL-SCNC: 106 MMOL/L (ref 96–106)
CO2 SERPL-SCNC: 21 MMOL/L (ref 20–29)
CREAT SERPL-MCNC: 0.78 MG/DL (ref 0.57–1)
EGFRCR SERPLBLD CKD-EPI 2021: 92 ML/MIN/1.73
GLOBULIN SER CALC-MCNC: 2.5 G/DL (ref 1.5–4.5)
GLUCOSE SERPL-MCNC: 104 MG/DL (ref 70–99)
HBA1C MFR BLD: 5.9 % (ref 4.8–5.6)
POTASSIUM SERPL-SCNC: 4.3 MMOL/L (ref 3.5–5.2)
PROT SERPL-MCNC: 7.2 G/DL (ref 6–8.5)
SODIUM SERPL-SCNC: 140 MMOL/L (ref 134–144)
TSH SERPL DL<=0.005 MIU/L-ACNC: 0.7 UIU/ML (ref 0.45–4.5)

## 2023-09-14 NOTE — TELEPHONE ENCOUNTER
Went into chart and taken the medications that is not on this list except her pain meds (she goes to pain mgmt). And discontinued them).

## 2023-09-27 ENCOUNTER — TELEPHONE (OUTPATIENT)
Dept: INTERNAL MEDICINE | Facility: CLINIC | Age: 53
End: 2023-09-27
Payer: COMMERCIAL

## 2023-09-27 DIAGNOSIS — J40 BRONCHITIS: Primary | ICD-10-CM

## 2023-09-27 RX ORDER — AZITHROMYCIN 250 MG/1
TABLET, FILM COATED ORAL
Qty: 6 TABLET | Refills: 0 | Status: SHIPPED | OUTPATIENT
Start: 2023-09-27

## 2023-09-27 RX ORDER — METHYLPREDNISOLONE 4 MG/1
TABLET ORAL
Qty: 21 TABLET | Refills: 0 | Status: SHIPPED | OUTPATIENT
Start: 2023-09-27

## 2023-09-27 RX ORDER — BENZONATATE 200 MG/1
200 CAPSULE ORAL 3 TIMES DAILY PRN
Qty: 21 CAPSULE | Refills: 0 | Status: SHIPPED | OUTPATIENT
Start: 2023-09-27 | End: 2023-10-04

## 2023-09-27 NOTE — TELEPHONE ENCOUNTER
Caller: Vielka Pratt    Relationship: Self    Best call back number: [phone]     What medication are you requesting: PREDNISONE, ANTIBIOTIC, PRESCRIPTION TO STOP COUGHING     What are your current symptoms: COUGH    How long have you been experiencing symptoms: ONE WEEK     Have you had these symptoms before:    [x] Yes  [] No    Have you been treated for these symptoms before:   [x] Yes  [] No    If a prescription is needed, what is your preferred pharmacy and phone number: [place], [place] - [address] [facility] [phone] [facility] [phone]

## 2023-09-28 ENCOUNTER — OFFICE VISIT (OUTPATIENT)
Dept: OTOLARYNGOLOGY | Facility: CLINIC | Age: 53
End: 2023-09-28
Payer: COMMERCIAL

## 2023-09-28 VITALS — WEIGHT: 140 LBS | BODY MASS INDEX: 23.9 KG/M2 | HEIGHT: 64 IN

## 2023-09-28 DIAGNOSIS — J31.0 CHRONIC RHINITIS: ICD-10-CM

## 2023-09-28 DIAGNOSIS — E04.1 THYROID NODULE: Primary | ICD-10-CM

## 2023-09-28 PROCEDURE — 99213 OFFICE O/P EST LOW 20 MIN: CPT | Performed by: OTOLARYNGOLOGY

## 2023-09-28 RX ORDER — AZELASTINE 1 MG/ML
2 SPRAY, METERED NASAL 2 TIMES DAILY
Qty: 30 ML | Refills: 11 | Status: SHIPPED | OUTPATIENT
Start: 2023-09-28

## 2023-11-02 ENCOUNTER — FLU SHOT (OUTPATIENT)
Dept: INTERNAL MEDICINE | Facility: CLINIC | Age: 53
End: 2023-11-02
Payer: COMMERCIAL

## 2023-11-02 DIAGNOSIS — Z12.31 SCREENING MAMMOGRAM FOR BREAST CANCER: Primary | ICD-10-CM

## 2023-11-02 DIAGNOSIS — Z23 FLU VACCINE NEED: Primary | ICD-10-CM

## 2023-11-02 NOTE — LETTER
[facility]  Vaccine Consent Form    Patient Name:  Vielka Pratt  Patient :  1970     Vaccine(s) Ordered    Fluzone (or Fluarix & Flulaval for VFC) >6 Mos ([id number])        Screening Checklist  The following questions should be completed prior to vaccination. If you answer “yes” to any question, it does not necessarily mean you should not be vaccinated. It just means we may need to clarify or ask more questions. If a question is unclear, please ask your healthcare provider to explain it.    Yes No   Any fever or moderate to severe illness today (mild illness and/or antibiotic treatment are not contraindications)?     Do you have a history of a serious reaction to any previous vaccinations, such as anaphylaxis, encephalopathy within 7 days, Guillain-Santa Claus syndrome within 6 weeks, seizure?     Have you received any live vaccine(s) in the past month (MMR, KULWINDER)?     Do you have an anaphylactic allergy to latex (DTaP, DTaP-IPV, Hep A, Hep B, MenB, RV, Td, Tdap), baker’s yeast (Hep B, HPV), or gelatin (KULWINDER, MMR)?     Do you have an anaphylactic allergy to neomycin (Rabies, KULWINDER, MMR, IPV, Hep A), polymyxin B (IPV), or streptomycin (IPV)?      Any cancer, leukemia, AIDS, or other immune system disorder? (KULWINDER, MMR, RV)     Do you have a parent, brother, or sister with an immune system problem (if immune competence of vaccine recipient clinically verified, can proceed)? (MMR, KULWINDER)     Any recent steroid treatments for >2 weeks, chemotherapy, or radiation treatment? (KULWINDER, MMR)     Have you received antibody-containing blood transfusions or IVIG in the past 11 months (recommended interval is dependent on product)? (MMR, KULWINDER)     Have you taken antiviral drugs (acyclovir, famciclovir, valacyclovir) in the last 24 or 48 hours, respectively (KULWINDER)?      Are you pregnant or planning to become pregnant within 1 month? (KULWINDER, MMR, HPV, IPV, MenB; For hep B- refer to Engerix-B)     For infants, have you ever been  told your child has had intussusception or a medical emergency involving obstruction of the intestine (RV)? If not for an infant, can skip this question.         *Ordering Physician/APC should be consulted if “yes” is checked by the patient or guardian above.      I have received, read, and understand the Vaccine Information Statement (VIS) for each vaccine ordered above.  I have considered my health status as well as the health status of my close contacts.  I have taken the opportunity to discuss my vaccine questions with my health care provider.   I have requested that the ordered vaccine(s) be given to me.  I understand the benefits and risks of the vaccines.  I understand that I should remain in the clinic for 15 minutes after receiving the vaccine(s).  _________________________________________________________  Signature of Patient or Parent/Legal Guardian ____________________  Date

## 2023-11-02 NOTE — PROGRESS NOTES
Vaccine Administration     Vielka Pratt presented to the office for vaccine administration. Discussed risks/benefits to vaccination, reviewed components of the vaccine, discussed VIS, discussed informed consent, informed consent obtained. Patient/Parent was allowed to accept or refuse vaccine. Questions answered to satisfactory state of patient/parent. We reviewed typical age appropriate and seasonally appropriate vaccinations. Reviewed immunization history and updated state vaccination form as needed. Patient was counseled on Influenza.     Vaccine(s) Administered: Influenza  Vaccine administered by: Marylin Swann CMA.   Injection Site: Intramuscular  Supplied: Clinic Supplied    Vaccine administration was Well tolerated by patient.. Patient was briefly monitored for reaction and was discharged.

## 2023-11-20 ENCOUNTER — TELEPHONE (OUTPATIENT)
Dept: INTERNAL MEDICINE | Facility: CLINIC | Age: 53
End: 2023-11-20
Payer: COMMERCIAL

## 2023-11-20 RX ORDER — BUSPIRONE HYDROCHLORIDE 10 MG/1
10 TABLET ORAL 3 TIMES DAILY
Qty: 90 TABLET | Refills: 11 | Status: SHIPPED | OUTPATIENT
Start: 2023-11-20

## 2023-11-20 NOTE — TELEPHONE ENCOUNTER
Rx Refill Note  Requested Prescriptions     Pending Prescriptions Disp Refills    busPIRone (BUSPAR) 10 MG tablet        Last office visit with prescribing clinician: 9/13/2023   Last telemedicine visit with prescribing clinician: Visit date not found   Next office visit with prescribing clinician: Visit date not found                         Would you like a call back once the refill request has been completed: [] Yes [] No    If the office needs to give you a call back, can they leave a voicemail: [] Yes [] No    Dorie Pineda RN  11/20/23, 09:05 CST

## 2023-11-20 NOTE — TELEPHONE ENCOUNTER
Caller: Vielka Pratt    Relationship: Self    Best call back number: [phone]     Requested Prescriptions:   Requested Prescriptions     Pending Prescriptions Disp Refills    busPIRone (BUSPAR) 10 MG tablet          Pharmacy where request should be sent: [place], [place] - [address] [facility] [phone] [facility] [phone] FX     Last office visit with prescribing clinician: 9/13/2023   Last telemedicine visit with prescribing clinician: Visit date not found   Next office visit with prescribing clinician: Visit date not found     Additional details provided by patient: COMPLETELY OUT    Does the patient have less than a 3 day supply:  [x] Yes  [] No        Kevan Pham III, RegHeather Rep   11/20/23 08:51 CST

## 2023-11-28 RX ORDER — MONTELUKAST SODIUM 10 MG/1
10 TABLET ORAL NIGHTLY
Qty: 90 TABLET | Refills: 0 | Status: SHIPPED | OUTPATIENT
Start: 2023-11-28

## 2023-12-29 ENCOUNTER — TELEPHONE (OUTPATIENT)
Dept: INTERNAL MEDICINE | Facility: CLINIC | Age: 53
End: 2023-12-29
Payer: COMMERCIAL

## 2024-01-22 ENCOUNTER — TELEPHONE (OUTPATIENT)
Dept: INTERNAL MEDICINE | Facility: CLINIC | Age: 54
End: 2024-01-22
Payer: COMMERCIAL

## 2024-01-22 DIAGNOSIS — J40 BRONCHITIS: ICD-10-CM

## 2024-01-22 RX ORDER — AZITHROMYCIN 250 MG/1
TABLET, FILM COATED ORAL
Qty: 6 TABLET | Refills: 0 | Status: SHIPPED | OUTPATIENT
Start: 2024-01-22

## 2024-01-22 RX ORDER — GUAIFENESIN 600 MG/1
600 TABLET, EXTENDED RELEASE ORAL 2 TIMES DAILY
Qty: 20 TABLET | Refills: 0 | Status: SHIPPED | OUTPATIENT
Start: 2024-01-22 | End: 2024-02-01

## 2024-01-22 NOTE — TELEPHONE ENCOUNTER
"    Caller: Vielka Pratt    Relationship: Self    Best call back number: [phone]     What medication are you requesting: MEDICATION TO TREAT SYMPTOMS     What are your current symptoms: EAR ACHE IN LEFT EAR, SORE THROAT, DRAINAGE, COUGH     How long have you been experiencing symptoms: \"AWHILE\" FOR MOST SYMPTOMS, EAR ACHE BEGAN ON 01.22.24    Have you had these symptoms before:    [x] Yes  [] No    Have you been treated for these symptoms before:   [x] Yes  [] No    If a prescription is needed, what is your preferred pharmacy and phone number: [place], [place] - [address] [facility] - [phone] [facility] [phone] FX     Additional notes:    PLEASE CALL PATIENT TO FOLLOW-UP ON THIS REQUEST.   "

## 2024-01-31 ENCOUNTER — TELEPHONE (OUTPATIENT)
Dept: INTERNAL MEDICINE | Facility: CLINIC | Age: 54
End: 2024-01-31
Payer: COMMERCIAL

## 2024-01-31 DIAGNOSIS — I10 PRIMARY HYPERTENSION: Primary | ICD-10-CM

## 2024-01-31 PROBLEM — R07.89 CHEST PRESSURE: Status: RESOLVED | Noted: 2018-08-16 | Resolved: 2024-01-31

## 2024-01-31 PROBLEM — D17.22 LIPOMA OF LEFT UPPER EXTREMITY: Status: RESOLVED | Noted: 2021-05-19 | Resolved: 2024-01-31

## 2024-01-31 PROBLEM — R07.2 PRECORDIAL PAIN: Status: RESOLVED | Noted: 2023-03-06 | Resolved: 2024-01-31

## 2024-01-31 PROBLEM — L68.0 HIRSUTISM: Status: RESOLVED | Noted: 2017-07-06 | Resolved: 2024-01-31

## 2024-01-31 PROBLEM — M25.511 ACUTE PAIN OF RIGHT SHOULDER: Status: RESOLVED | Noted: 2019-01-31 | Resolved: 2024-01-31

## 2024-01-31 PROBLEM — R94.6 ABNORMAL THYROID FUNCTION TEST: Status: RESOLVED | Noted: 2017-07-06 | Resolved: 2024-01-31

## 2024-01-31 PROBLEM — E87.6 HYPOKALEMIA: Status: RESOLVED | Noted: 2019-02-04 | Resolved: 2024-01-31

## 2024-01-31 PROBLEM — R06.09 DYSPNEA ON EXERTION: Status: RESOLVED | Noted: 2023-03-06 | Resolved: 2024-01-31

## 2024-01-31 PROBLEM — L65.9 ALOPECIA: Status: RESOLVED | Noted: 2017-07-06 | Resolved: 2024-01-31

## 2024-01-31 PROBLEM — M75.41 IMPINGEMENT SYNDROME OF RIGHT SHOULDER: Status: RESOLVED | Noted: 2019-01-31 | Resolved: 2024-01-31

## 2024-01-31 PROBLEM — R51.9 HEADACHE: Status: RESOLVED | Noted: 2017-06-11 | Resolved: 2024-01-31

## 2024-01-31 PROBLEM — Z72.0 NICOTINE ABUSE: Status: RESOLVED | Noted: 2018-08-16 | Resolved: 2024-01-31

## 2024-01-31 PROBLEM — D75.1 ERYTHROCYTOSIS: Status: RESOLVED | Noted: 2017-07-06 | Resolved: 2024-01-31

## 2024-01-31 RX ORDER — CARVEDILOL 12.5 MG/1
12.5 TABLET ORAL 2 TIMES DAILY WITH MEALS
Qty: 60 TABLET | Refills: 11 | Status: SHIPPED | OUTPATIENT
Start: 2024-01-31

## 2024-01-31 NOTE — TELEPHONE ENCOUNTER
Rx Refill Note  Requested Prescriptions     Pending Prescriptions Disp Refills    carvedilol (COREG) 12.5 MG tablet       Sig: Take 1 tablet by mouth 2 (Two) Times a Day With Meals.      Last office visit with prescribing clinician: 9/13/2023   Last telemedicine visit with prescribing clinician: Visit date not found   Next office visit with prescribing clinician: Visit date not found                         Would you like a call back once the refill request has been completed: [] Yes [] No    If the office needs to give you a call back, can they leave a voicemail: [] Yes [] No    Dorie Pineda RN  01/31/24, 12:30 CST

## 2024-01-31 NOTE — TELEPHONE ENCOUNTER
Caller: Vielka Pratt    Relationship: Self    Best call back number: [phone]     Requested Prescriptions:   Requested Prescriptions     Pending Prescriptions Disp Refills    carvedilol (COREG) 12.5 MG tablet       Sig: Take 1 tablet by mouth 2 (Two) Times a Day With Meals.        Pharmacy where request should be sent: [facility] [address] [facility] [phone] [facility] [phone] FX     Last office visit with prescribing clinician: 9/13/2023   Last telemedicine visit with prescribing clinician: Visit date not found   Next office visit with prescribing clinician: Visit date not found     Additional details provided by patient:     Does the patient have less than a 3 day supply:  [x] Yes  [] No        Kevan Pham III, Km Rep   01/31/24 12:20 CST

## 2024-02-15 ENCOUNTER — TELEMEDICINE (OUTPATIENT)
Dept: INTERNAL MEDICINE | Facility: CLINIC | Age: 54
End: 2024-02-15
Payer: COMMERCIAL

## 2024-02-15 DIAGNOSIS — J32.0 MAXILLARY SINUSITIS, UNSPECIFIED CHRONICITY: Primary | ICD-10-CM

## 2024-02-15 PROCEDURE — 99213 OFFICE O/P EST LOW 20 MIN: CPT | Performed by: FAMILY MEDICINE

## 2024-02-15 RX ORDER — METHYLPREDNISOLONE 4 MG/1
TABLET ORAL
Qty: 21 TABLET | Refills: 0 | Status: SHIPPED | OUTPATIENT
Start: 2024-02-15 | End: 2024-02-29

## 2024-02-15 RX ORDER — GUAIFENESIN 600 MG/1
600 TABLET, EXTENDED RELEASE ORAL 2 TIMES DAILY
Qty: 20 TABLET | Refills: 0 | Status: SHIPPED | OUTPATIENT
Start: 2024-02-15 | End: 2024-02-25

## 2024-02-15 RX ORDER — LEVOFLOXACIN 500 MG/1
500 TABLET, FILM COATED ORAL DAILY
Qty: 7 TABLET | Refills: 0 | Status: SHIPPED | OUTPATIENT
Start: 2024-02-15 | End: 2024-02-22

## 2024-03-04 RX ORDER — MONTELUKAST SODIUM 10 MG/1
10 TABLET ORAL NIGHTLY
Qty: 90 TABLET | Refills: 3 | Status: SHIPPED | OUTPATIENT
Start: 2024-03-04

## 2024-03-04 RX ORDER — TOPIRAMATE 200 MG/1
200 CAPSULE, EXTENDED RELEASE ORAL DAILY
Qty: 90 CAPSULE | Refills: 3 | Status: SHIPPED | OUTPATIENT
Start: 2024-03-04

## 2024-03-04 NOTE — TELEPHONE ENCOUNTER
Caller: Vielka Pratt    Relationship: Self    Best call back number: [phone]     Requested Prescriptions:   Requested Prescriptions     Pending Prescriptions Disp Refills    montelukast (SINGULAIR) 10 MG tablet 90 tablet 0     Sig: Take 1 tablet by mouth Every Night.    Trokendi  MG capsule sustained-release 24 hr 30 capsule      Sig: Take  by mouth Daily.        Pharmacy where request should be sent: [address] [phone] [facility] [phone] FX     Last office visit with prescribing clinician: 9/13/2023   Last telemedicine visit with prescribing clinician: 2/15/2024   Next office visit with prescribing clinician: Visit date not found     Additional details provided by patient: LESS THEN 3 DAYS    Does the patient have less than a 3 day supply:  [x] Yes  [] No    Would you like a call back once the refill request has been completed: [] Yes [x] No    If the office needs to give you a call back, can they leave a voicemail: [] Yes [x] No    Km Weiss Rep   03/04/24 09:34 CST

## 2024-03-04 NOTE — TELEPHONE ENCOUNTER
Rx Refill Note  Requested Prescriptions     Pending Prescriptions Disp Refills    montelukast (SINGULAIR) 10 MG tablet 90 tablet 0     Sig: Take 1 tablet by mouth Every Night.    Trokendi  MG capsule sustained-release 24 hr 30 capsule      Sig: Take  by mouth Daily.      Last office visit with prescribing clinician: 9/13/2023   Last telemedicine visit with prescribing clinician: 2/15/2024   Next office visit with prescribing clinician: Visit date not found                         Would you like a call back once the refill request has been completed: [] Yes [] No    If the office needs to give you a call back, can they leave a voicemail: [] Yes [] No    Dorie Pineda RN  03/04/24, 09:35 CST

## 2024-04-30 ENCOUNTER — TELEMEDICINE (OUTPATIENT)
Dept: INTERNAL MEDICINE | Facility: CLINIC | Age: 54
End: 2024-04-30
Payer: COMMERCIAL

## 2024-04-30 DIAGNOSIS — J45.31 MILD PERSISTENT ASTHMA WITH ACUTE EXACERBATION: ICD-10-CM

## 2024-04-30 DIAGNOSIS — E11.9 TYPE 2 DIABETES MELLITUS WITHOUT COMPLICATION, WITHOUT LONG-TERM CURRENT USE OF INSULIN: Primary | ICD-10-CM

## 2024-04-30 PROCEDURE — 99214 OFFICE O/P EST MOD 30 MIN: CPT | Performed by: FAMILY MEDICINE

## 2024-04-30 RX ORDER — ALBUTEROL SULFATE 90 UG/1
AEROSOL, METERED RESPIRATORY (INHALATION)
Qty: 18 G | Refills: 11 | Status: SHIPPED | OUTPATIENT
Start: 2024-04-30

## 2024-04-30 RX ORDER — METHYLPREDNISOLONE 4 MG/1
TABLET ORAL
Qty: 21 TABLET | Refills: 0 | Status: SHIPPED | OUTPATIENT
Start: 2024-04-30 | End: 2024-05-14

## 2024-04-30 RX ORDER — BENZONATATE 200 MG/1
200 CAPSULE ORAL 3 TIMES DAILY PRN
Qty: 21 CAPSULE | Refills: 0 | Status: SHIPPED | OUTPATIENT
Start: 2024-04-30 | End: 2024-05-07

## 2024-04-30 RX ORDER — LEVOFLOXACIN 500 MG/1
500 TABLET, FILM COATED ORAL DAILY
Qty: 7 TABLET | Refills: 0 | Status: SHIPPED | OUTPATIENT
Start: 2024-04-30 | End: 2024-05-07

## 2024-04-30 NOTE — PROGRESS NOTES
Subjective     TeleHealth Visit Statement:    This visit was conducted using the audio and video through my chart implementation.     I have asked the patient where she is physically located today during this visit and they confirmed they are currently located in the [facility].      The patient verbalized understanding of the following:   - The visit would be conducted using audio/video and he/she consented to treatment.   - There is a possibility that the visit could disconnect or fail to connect and in that instance, it is possible that security protocols could fail causing a breach of privacy of his/her personal medical information.    - That if I determine the visit is not giving me sufficient information to make an appropriate clinical decision, I will require him/her to do a face to face visit in the clinic.    - That there would be a charge associated with the visit and agreed to pay any deductible or copay.    - That she could withdraw from the visit and request a face to face visit at any time.     I spent 20 minutes in the care of this patient during this telehealth encounter (including review of pertinent medical records, discussion with the patient, evaluation of patient problems and a coordination of a care plan as part of this visit).      I discussed with the patient the nature of our telemedicine visits, that:   - I would evaluate the patient and recommend diagnostics and treatments based on my assessment   - Our sessions are not being recorded and that personal health information is protected   - Our team would provide follow-up care and in person if/when the patient needs it     Physical examination was limited with exceptions as noted. In person physical examination was deferred    Two Factor Identifier was obtained (patient's name and )   The visit included audio and video interaction. No technical issues occurred during this visit.      No chief complaint on file.      History  of Present Illness    Vielka Pratt is a 54 y.o. female who presents for a routine visit at this time. Patient comes in with a history of upper respiratory congestion, and cough.   Cough is productive with yellow-green sputum.  Patient is also noted wheezing as well as occasional rattles.  Reports no sore throat, fever, nausea, vomiting myalgias associated with this at this time.  Patient reports that this has been going on for 4 days at this point, and would like something to help with her bronshitis at this time.     Patient presents for follow up of diabetes.. Current symptoms include: polyuria. Patient denies hyperglycemia, hypoglycemia , and increased appetite.  Home sugars: patient does not check sugars. Current treatments: no recent interventions.  Patient is due for her diabetic labs at this time, so we will go ahead and order hemoglobin A1c, comprehensive metabolic panel, lipid profile, and microalbumin urine for evaluation.     Patient's PMR from outside medical facility reviewed and noted.    Review of systems   negative unless otherwise specified above in HPI    Past Medical History:   Past Medical History:   Diagnosis Date    Abnormal granulation tissue     Acute bacterial pharyngitis     Acute tonsillitis, unspecified     Allergic asthma     IgE-mediated allergic asthma       Allergic rhinitis due to pollen     Anxiety     Asthma     Back pain     Backache     Breast lump     Cataracts, bilateral     Cellulitis of trunk     Postoperative    Chronic pain     Corns and callus     Cough     Depressive disorder     Diarrhea     Dysphagia     Dysuria     Endometriosis     Family history of malignant neoplasm of gastrointestinal tract     GERD (gastroesophageal reflux disease)     Hirsutism     Hypertension     Irritable bowel syndrome     Leukorrhea, not specified as infective     Mastitis     Migraine     Sebaceous cyst     Shortness of breath     Tick bite     Tobacco abuse disorder 7/6/2017     Tobacco user     Upper respiratory infection      Past Surgical History:  Past Surgical History:   Procedure Laterality Date    CATARACT EXTRACTION, BILATERAL      CHOLECYSTECTOMY      COLONOSCOPY N/A 4/2/2018    Procedure: COLONOSCOPY;  Surgeon: Van Romano DO;  Location: [facility] ENDOSCOPY;  Service: Gastroenterology    DIAGNOSTIC LAPAROSCOPY  04/08/2008    Laparosc diagnostic (2)       ENDOSCOPY  03/26/2013    Colon endoscopy 36022 (2)       ENDOSCOPY N/A 4/2/2018    Procedure: ESOPHAGOGASTRODUODENOSCOPY;  Surgeon: Van Romano DO;  Location: [facility] ENDOSCOPY;  Service: Gastroenterology    ENDOSCOPY W/ PEG TUBE PLACEMENT  03/26/2013    EGD w/ tube 34356 (2)       EXCISION LESION  01/30/2013    EXC TR-EXT Benign+Irina 0.6-1 CM 66017 (1)       HAND TENOLYSIS  07/23/1992    Hand/finger surgery (1)       HEAD & NECK SKIN LESION EXCISIONAL BIOPSY  01/31/1994    Biopsy of Skin 13473 (2)       INCISION AND DRAINAGE BREAST ABSCESS  12/03/2012    Anesth, surgery of breast (1)       INJECTION OF MEDICATION  09/12/2011    B12 (1)       INJECTION OF MEDICATION  04/28/2015    Celestone (betamethasone) (1)      INJECTION OF MEDICATION  08/17/2015    Depo Medrol (Methylprednisone) (6)       INJECTION OF MEDICATION  06/08/2012    Kenalog (3)       INJECTION OF MEDICATION  06/08/2012    Toradol (1)       NASOLACRIMAL DUCT PROBING  08/16/1973    Probe nasolacrimal duct (1)       NEPHROSTOMY TRACT DILATATION W/ LITHOTRIPSY      PAP SMEAR  03/20/2008    PAP SMEAR (1)       TOTAL ABDOMINAL HYSTERECTOMY WITH SALPINGO OOPHORECTOMY  04/09/2012    ERLIN/BSO (1)       US GUIDED FINE NEEDLE ASPIRATION  6/26/2020    US GUIDED FINE NEEDLE ASPIRATION  7/17/2020     Social History:  reports that she has been smoking cigarettes. She has a 19.5 pack-year smoking history. She has never used smokeless tobacco. She reports that she does not currently use alcohol. She reports that she does not use drugs.    Family History: family history  includes Cancer in her paternal aunt and another family member; Colon cancer in an other family member; Diabetes in her brother and another family member; Heart disease in an other family member; Hypertension in her mother and another family member; Lung cancer in her father and another family member.      Allergies:  Allergies   Allergen Reactions    Meat Extract Other (See Comments)     Beef,pork,lamb. All mammal meat  Causes pain    Contrast Dye (Echo Or Unknown Ct/Mr) Hives    Elemental Sulfur Rash    Iodinated Contrast Media Hives and Rash    Iron Nausea And Vomiting    Naproxen Rash    Penicillins Rash    Sulfa Antibiotics Rash    Tetracyclines & Related Rash     Medications:  Prior to Admission medications    Medication Sig Start Date End Date Taking? Authorizing Provider   albuterol (VENTOLIN HFA) 108 (90 Base) MCG/ACT inhaler 2 puffs every 4 hours as needed for breathing 10/25/17   Percy Sanders MD   azelastine (ASTELIN) 0.1 % nasal spray 2 sprays into the nostril(s) as directed by provider 2 (Two) Times a Day. Use in each nostril as directed 9/28/23   Joseph Chakraborty MD   azithromycin (Zithromax Z-Tim) 250 MG tablet Take 2 tablets by mouth on day 1, then 1 tablet daily on days 2-5 1/22/24   Nick Neal MD   busPIRone (BUSPAR) 10 MG tablet Take 1 tablet by mouth 3 (Three) Times a Day. 11/20/23   Nick Neal MD   carvedilol (COREG) 12.5 MG tablet Take 1 tablet by mouth 2 (Two) Times a Day With Meals. 1/31/24   Nick Neal MD   EPINEPHrine (EPIPEN) 0.3 MG/0.3ML solution auto-injector injection Inject 0.3 mL into the appropriate muscle as directed by prescriber. 8/24/18   Diane Lassiter MD   escitalopram (LEXAPRO) 20 MG tablet TAKE 1 TABLET BY MOUTH DAILY 2/21/19   Diane Lassiter MD   fluorometholone (FML) 0.1 % ophthalmic suspension  1/18/20   Diane Lassiter MD   fluticasone (FLONASE) 50 MCG/ACT nasal spray 2 sprays into the nostril(s)  as directed by provider Daily. 9/13/23   Nick Neal MD   Fluticasone-Salmeterol (ADVAIR DISKUS IN) Inhale.    Diane Lassiter MD   HYDROcodone-acetaminophen (NORCO)  MG per tablet Take 1 tablet by mouth 3 (Three) Times a Day. 5/1/21   Diane Lassiter MD   metFORMIN (GLUCOPHAGE) 500 MG tablet TAKE 1 TABLET BY MOUTH TWICE DAILY WITH MEALS 8/19/19   Diane Lassiter MD   methylPREDNISolone (MEDROL) 4 MG dose pack Take as directed on package instructions. 9/27/23   Nick Neal MD   montelukast (SINGULAIR) 10 MG tablet Take 1 tablet by mouth Every Night. 11/28/23   Nick Neal MD   pantoprazole (PROTONIX) 40 MG EC tablet TAKE 1 TABLET EVERY MORNING BEFORE BREAKFAST 7/22/19   Diane Lassiter MD   rosuvastatin (CRESTOR) 10 MG tablet Take 1 tablet by mouth Daily. 3/6/23   Gopi Rodriguez MD   spironolactone (ALDACTONE) 25 MG tablet Take 1 tablet by mouth Daily. 9/13/23   Nick Neal MD   TROKENDI  MG capsule sustained-release 24 hr Take 1 tablet by mouth Daily. 8/27/16   Diane Lassiter MD   vitamin D (ERGOCALCIFEROL) 1.25 MG (39799 UT) capsule capsule Take 1 capsule by mouth 1 (One) Time Per Week. 9/13/23   Nick Neal MD           Objective     Telehealth Physical Exam Statement: In person physical examination was deferred        Results Reviewed:  Glucose   Date Value Ref Range Status   09/13/2023 104 (H) 70 - 99 mg/dL Final   08/17/2023 93 65 - 99 mg/dL Final     BUN   Date Value Ref Range Status   09/13/2023 11 6 - 24 mg/dL Final   08/17/2023 9 6 - 20 mg/dL Final   07/09/2023 11 7 - 20 mg/dL Final     Creatinine   Date Value Ref Range Status   09/13/2023 0.78 0.57 - 1.00 mg/dL Final   08/17/2023 0.83 0.57 - 1.00 mg/dL Final   07/09/2023 0.78 0.52 - 1.25 mg/dL Final     Sodium   Date Value Ref Range Status   09/13/2023 140 134 - 144 mmol/L Final   08/17/2023 138 136 - 145 mmol/L Final   07/09/2023  138 137 - 145 mmol/L Final     Potassium   Date Value Ref Range Status   09/13/2023 4.3 3.5 - 5.2 mmol/L Final   08/17/2023 4.6 3.5 - 5.2 mmol/L Final   07/09/2023 4.0 3.5 - 5.1 mmol/L Final     Chloride   Date Value Ref Range Status   09/13/2023 106 96 - 106 mmol/L Final   08/17/2023 105 98 - 107 mmol/L Final   07/09/2023 110 (H) 98 - 107 mmol/L Final     CO2   Date Value Ref Range Status   08/17/2023 21.0 (L) 22.0 - 29.0 mmol/L Final     Total CO2   Date Value Ref Range Status   09/13/2023 21 20 - 29 mmol/L Final   07/09/2023 21 (L) 22 - 30 mmol/L Final     Calcium   Date Value Ref Range Status   09/13/2023 9.9 8.7 - 10.2 mg/dL Final   08/17/2023 10.0 8.6 - 10.5 mg/dL Final   07/09/2023 9.2 8.4 - 10.2 mg/dL Final     ALT (SGPT)   Date Value Ref Range Status   09/13/2023 17 0 - 32 IU/L Final   07/09/2023 30 9 - 72 U/L Final   08/15/2019 17 14 - 59 U/L Final     AST (SGOT)   Date Value Ref Range Status   09/13/2023 13 0 - 40 IU/L Final   07/09/2023 22 14 - 59 U/L Final   08/15/2019 14 (L) 15 - 37 U/L Final     WBC   Date Value Ref Range Status   02/24/2023 10.00 3.40 - 10.80 10*3/mm3 Final   02/21/2019 9.2 4.0 - 11.0 10*3/uL Final     Hematocrit   Date Value Ref Range Status   02/24/2023 43.5 34.0 - 46.6 % Final   02/21/2019 49.5 (H) 37.9 - 43.9 % Final     Platelets   Date Value Ref Range Status   02/24/2023 287 140 - 450 10*3/mm3 Final   02/21/2019 220 150 - 450 10*3/uL Final     Total Cholesterol   Date Value Ref Range Status   06/12/2017 201 (H) 0 - 199 mg/dL Final     Triglycerides   Date Value Ref Range Status   02/24/2023 118 10 - 150 mg/dL Final     HDL Cholesterol   Date Value Ref Range Status   02/24/2023 52 23 - 92 mg/dL Final     LDL Cholesterol    Date Value Ref Range Status   02/24/2023 160 mg/dL Final     Comment:         OPTIMAL: <100 mg/dl  LOW RISK: 100-129 mg/dl  BORDERLINE HIGH: 130-159 mg/dl  HIGH: 160-189 mg/dl  VERY HIGH: >189 mg/dl     LDL/HDL Ratio   Date Value Ref Range Status   06/12/2017  3.02 0.00 - 3.22 Final     Hemoglobin A1C   Date Value Ref Range Status   09/13/2023 5.9 (H) 4.8 - 5.6 % Final     Comment:              Prediabetes: 5.7 - 6.4           Diabetes: >6.4           Glycemic control for adults with diabetes: <7.0     06/16/2023 6.4 (H) <5.7 % Final     Comment:       5.7%-6.4% is diagnostic as prediabetes  6.5% or higher on two seperate test collections indicates Diabetes             Assessment / Plan     Assessment/Plan:   Diagnosis Plan   1. Type 2 diabetes mellitus without complication, without long-term current use of insulin  Hemoglobin A1c    Comprehensive Metabolic Panel    MicroAlbumin, Urine, Random - Urine, Clean Catch    Lipid Panel      2. Mild persistent asthma with acute exacerbation  methylPREDNISolone (MEDROL) 4 MG dose pack    benzonatate (TESSALON) 200 MG capsule    levoFLOXacin (Levaquin) 500 MG tablet              Return in about 6 months (around 10/30/2024). unless patient needs to be seen sooner or acute issues arise.      I have discussed the patient results/orders and and plan/recommendation with them at today's visit.      EMR Dictation/Transcription disclaimer:   Some of this note may be an electronic transcription/translation of spoken language to printed text. The electronic translation of spoken language may permit erroneous, or at times, nonsensical words or phrases to be inadvertently transcribed; Although I have reviewed the note for such errors, some may still exist.     Signed by:    Nick Neal MD Date: 04/30/24

## 2024-05-09 ENCOUNTER — OFFICE VISIT (OUTPATIENT)
Dept: INTERNAL MEDICINE | Facility: CLINIC | Age: 54
End: 2024-05-09
Payer: COMMERCIAL

## 2024-05-09 VITALS
TEMPERATURE: 98.6 F | HEART RATE: 73 BPM | HEIGHT: 64 IN | DIASTOLIC BLOOD PRESSURE: 90 MMHG | SYSTOLIC BLOOD PRESSURE: 124 MMHG | BODY MASS INDEX: 22.64 KG/M2 | OXYGEN SATURATION: 99 % | WEIGHT: 132.6 LBS

## 2024-05-09 DIAGNOSIS — R05.1 ACUTE COUGH: Primary | ICD-10-CM

## 2024-05-09 DIAGNOSIS — E78.2 MIXED HYPERLIPIDEMIA: ICD-10-CM

## 2024-05-09 DIAGNOSIS — R30.0 DYSURIA: ICD-10-CM

## 2024-05-09 DIAGNOSIS — R06.02 SOB (SHORTNESS OF BREATH): ICD-10-CM

## 2024-05-09 DIAGNOSIS — E11.9 TYPE 2 DIABETES MELLITUS WITHOUT COMPLICATION, WITHOUT LONG-TERM CURRENT USE OF INSULIN: ICD-10-CM

## 2024-05-09 DIAGNOSIS — Z72.0 TOBACCO ABUSE: ICD-10-CM

## 2024-05-09 LAB
BILIRUB BLD-MCNC: NEGATIVE MG/DL
CLARITY, POC: CLEAR
COLOR UR: YELLOW
EXPIRATION DATE: NORMAL
GLUCOSE UR STRIP-MCNC: NEGATIVE MG/DL
INTERNAL CONTROL: NORMAL
KETONES UR QL: NEGATIVE
LEUKOCYTE EST, POC: NEGATIVE
Lab: NORMAL
NITRITE UR-MCNC: NEGATIVE MG/ML
PH UR: 6 [PH] (ref 5–8)
PROT UR STRIP-MCNC: NEGATIVE MG/DL
RBC # UR STRIP: NEGATIVE /UL
SARS-COV-2 AG UPPER RESP QL IA.RAPID: NOT DETECTED
SP GR UR: 1 (ref 1–1.03)
UROBILINOGEN UR QL: NORMAL

## 2024-05-09 RX ORDER — NICOTINE 21 MG/24HR
1 PATCH, TRANSDERMAL 24 HOURS TRANSDERMAL EVERY 24 HOURS
Qty: 30 PATCH | Refills: 0 | Status: SHIPPED | OUTPATIENT
Start: 2024-05-09

## 2024-05-09 RX ORDER — METHYLPREDNISOLONE 4 MG/1
TABLET ORAL
Qty: 21 TABLET | Refills: 0 | Status: SHIPPED | OUTPATIENT
Start: 2024-05-09 | End: 2024-05-23

## 2024-05-09 RX ORDER — DOXYCYCLINE HYCLATE 100 MG/1
100 CAPSULE ORAL 2 TIMES DAILY
Qty: 14 CAPSULE | Refills: 0 | Status: SHIPPED | OUTPATIENT
Start: 2024-05-09 | End: 2024-05-16

## 2024-05-09 RX ORDER — CETIRIZINE HYDROCHLORIDE 10 MG/1
10 TABLET ORAL DAILY
Qty: 30 TABLET | Refills: 11 | Status: SHIPPED | OUTPATIENT
Start: 2024-05-09

## 2024-05-10 LAB
ALBUMIN SERPL-MCNC: 4.3 G/DL (ref 3.8–4.9)
ALBUMIN/GLOB SERPL: 1.6 {RATIO} (ref 1.2–2.2)
ALP SERPL-CCNC: 85 IU/L (ref 44–121)
ALT SERPL-CCNC: 18 IU/L (ref 0–32)
AST SERPL-CCNC: 15 IU/L (ref 0–40)
BASOPHILS # BLD AUTO: 0.1 X10E3/UL (ref 0–0.2)
BASOPHILS NFR BLD AUTO: 1 %
BILIRUB SERPL-MCNC: 0.2 MG/DL (ref 0–1.2)
BUN SERPL-MCNC: 14 MG/DL (ref 6–24)
BUN/CREAT SERPL: 16 (ref 9–23)
CALCIUM SERPL-MCNC: 9.9 MG/DL (ref 8.7–10.2)
CHLORIDE SERPL-SCNC: 106 MMOL/L (ref 96–106)
CHOLEST SERPL-MCNC: 115 MG/DL (ref 100–199)
CO2 SERPL-SCNC: 18 MMOL/L (ref 20–29)
CREAT SERPL-MCNC: 0.88 MG/DL (ref 0.57–1)
EGFRCR SERPLBLD CKD-EPI 2021: 79 ML/MIN/1.73
EOSINOPHIL # BLD AUTO: 0.2 X10E3/UL (ref 0–0.4)
EOSINOPHIL NFR BLD AUTO: 2 %
ERYTHROCYTE [DISTWIDTH] IN BLOOD BY AUTOMATED COUNT: 13.3 % (ref 11.7–15.4)
GLOBULIN SER CALC-MCNC: 2.7 G/DL (ref 1.5–4.5)
GLUCOSE SERPL-MCNC: 79 MG/DL (ref 70–99)
HBA1C MFR BLD: 6.3 % (ref 4.8–5.6)
HCT VFR BLD AUTO: 47.2 % (ref 34–46.6)
HDLC SERPL-MCNC: 42 MG/DL
HGB BLD-MCNC: 15.7 G/DL (ref 11.1–15.9)
IMM GRANULOCYTES # BLD AUTO: 0.1 X10E3/UL (ref 0–0.1)
IMM GRANULOCYTES NFR BLD AUTO: 1 %
LDLC SERPL CALC-MCNC: 48 MG/DL (ref 0–99)
LYMPHOCYTES # BLD AUTO: 4 X10E3/UL (ref 0.7–3.1)
LYMPHOCYTES NFR BLD AUTO: 39 %
MCH RBC QN AUTO: 30.5 PG (ref 26.6–33)
MCHC RBC AUTO-ENTMCNC: 33.3 G/DL (ref 31.5–35.7)
MCV RBC AUTO: 92 FL (ref 79–97)
MONOCYTES # BLD AUTO: 0.7 X10E3/UL (ref 0.1–0.9)
MONOCYTES NFR BLD AUTO: 7 %
NEUTROPHILS # BLD AUTO: 5.3 X10E3/UL (ref 1.4–7)
NEUTROPHILS NFR BLD AUTO: 50 %
PLATELET # BLD AUTO: 351 X10E3/UL (ref 150–450)
POTASSIUM SERPL-SCNC: 4.6 MMOL/L (ref 3.5–5.2)
PROT SERPL-MCNC: 7 G/DL (ref 6–8.5)
RBC # BLD AUTO: 5.14 X10E6/UL (ref 3.77–5.28)
SODIUM SERPL-SCNC: 139 MMOL/L (ref 134–144)
TRIGL SERPL-MCNC: 146 MG/DL (ref 0–149)
VLDLC SERPL CALC-MCNC: 25 MG/DL (ref 5–40)
WBC # BLD AUTO: 10.4 X10E3/UL (ref 3.4–10.8)

## 2024-05-30 RX ORDER — ESCITALOPRAM OXALATE 20 MG/1
20 TABLET ORAL DAILY
Qty: 90 TABLET | Refills: 3 | Status: SHIPPED | OUTPATIENT
Start: 2024-05-30

## 2024-06-12 ENCOUNTER — OFFICE VISIT (OUTPATIENT)
Dept: INTERNAL MEDICINE | Facility: CLINIC | Age: 54
End: 2024-06-12
Payer: COMMERCIAL

## 2024-06-12 VITALS
HEART RATE: 91 BPM | HEIGHT: 64 IN | DIASTOLIC BLOOD PRESSURE: 87 MMHG | WEIGHT: 134.4 LBS | SYSTOLIC BLOOD PRESSURE: 145 MMHG | BODY MASS INDEX: 22.94 KG/M2

## 2024-06-12 DIAGNOSIS — J40 BRONCHITIS: ICD-10-CM

## 2024-06-12 DIAGNOSIS — J45.31 MILD PERSISTENT ASTHMA WITH ACUTE EXACERBATION: Primary | ICD-10-CM

## 2024-06-12 PROCEDURE — 96372 THER/PROPH/DIAG INJ SC/IM: CPT | Performed by: FAMILY MEDICINE

## 2024-06-12 PROCEDURE — 99213 OFFICE O/P EST LOW 20 MIN: CPT | Performed by: FAMILY MEDICINE

## 2024-06-12 RX ORDER — LEVOFLOXACIN 500 MG/1
500 TABLET, FILM COATED ORAL DAILY
Qty: 7 TABLET | Refills: 0 | Status: SHIPPED | OUTPATIENT
Start: 2024-06-12 | End: 2024-06-19

## 2024-06-12 RX ORDER — DEXTROMETHORPHAN HYDROBROMIDE AND PROMETHAZINE HYDROCHLORIDE 15; 6.25 MG/5ML; MG/5ML
5 SYRUP ORAL 4 TIMES DAILY PRN
Qty: 240 ML | Refills: 0 | Status: SHIPPED | OUTPATIENT
Start: 2024-06-12

## 2024-06-12 RX ORDER — LORATADINE PSEUDOEPHEDRINE SULFATE 10; 240 MG/1; MG/1
1 TABLET, EXTENDED RELEASE ORAL DAILY
Qty: 30 TABLET | Refills: 3 | Status: SHIPPED | OUTPATIENT
Start: 2024-06-12

## 2024-06-12 RX ORDER — METHYLPREDNISOLONE 4 MG/1
TABLET ORAL
Qty: 21 TABLET | Refills: 0 | Status: SHIPPED | OUTPATIENT
Start: 2024-06-12

## 2024-06-12 RX ORDER — BUDESONIDE, GLYCOPYRROLATE, AND FORMOTEROL FUMARATE 160; 9; 4.8 UG/1; UG/1; UG/1
2 AEROSOL, METERED RESPIRATORY (INHALATION) 2 TIMES DAILY
Qty: 1 EACH | Refills: 0 | COMMUNITY
Start: 2024-06-12

## 2024-06-12 RX ORDER — ALBUTEROL SULFATE AND BUDESONIDE 90; 80 UG/1; UG/1
1 AEROSOL, METERED RESPIRATORY (INHALATION) EVERY 6 HOURS PRN
Qty: 10.7 G | Refills: 11 | Status: SHIPPED | OUTPATIENT
Start: 2024-06-12

## 2024-06-12 RX ORDER — ALBUTEROL SULFATE AND BUDESONIDE 90; 80 UG/1; UG/1
1 AEROSOL, METERED RESPIRATORY (INHALATION) EVERY 6 HOURS PRN
Qty: 5.9 G | Refills: 0 | COMMUNITY
Start: 2024-06-12

## 2024-06-12 RX ORDER — FLUCONAZOLE 100 MG/1
100 TABLET ORAL DAILY
Qty: 7 TABLET | Refills: 0 | Status: SHIPPED | OUTPATIENT
Start: 2024-06-12 | End: 2024-06-19

## 2024-06-12 NOTE — PROGRESS NOTES
Subjective     Chief Complaint   Patient presents with    Back Pain    Cough    Sore Throat    Eye Pain       History of Present Illness    Vielka Pratt is a 54 y.o. female who presents for a routine visit at this time. Patient comes in with a history of upper respiratory congestion, and cough.   Cough is productive with yellow-green sputum.  Patient is also noted wheezing as well as occasional rattles.  Reports no sore throat, fever, nausea, vomiting myalgias associated with this at this time.  Patient reports that this has been going on for 10 days at this point, and would like something to help with her bronchitis at this time.  Patient reports she still feels short of  breath even after a course of antibiotics and steroids    Patient has not dramatically improved since the last time she was seen she states that her Advair had given her thrush and this become more bothersome and makes us wonder if the issue may be linked to a fungal infection going down her esophagus we will go ahead and do a 7-day round of Diflucan to help with this could also represent an underlying exacerbation of her asthma and COPD we will give her a steroid shot here give her additional steroids to take at home and will change out her inhalers to see if this will help.    Past Medical History:   Past Medical History:   Diagnosis Date    Abnormal granulation tissue     Acute bacterial pharyngitis     Acute tonsillitis, unspecified     Allergic asthma     IgE-mediated allergic asthma       Allergic rhinitis due to pollen     Anxiety     Asthma     Back pain     Backache     Breast lump     Cataracts, bilateral     Cellulitis of trunk     Postoperative    Chronic pain     Corns and callus     Cough     Depressive disorder     Diarrhea     Dysphagia     Dysuria     Endometriosis     Family history of malignant neoplasm of gastrointestinal tract     GERD (gastroesophageal reflux disease)     Hirsutism     Hypertension      Irritable bowel syndrome     Leukorrhea, not specified as infective     Mastitis     Migraine     Sebaceous cyst     Shortness of breath     Tick bite     Tobacco abuse disorder 7/6/2017    Tobacco user     Upper respiratory infection      Past Surgical History:  Past Surgical History:   Procedure Laterality Date    CATARACT EXTRACTION, BILATERAL      CHOLECYSTECTOMY      COLONOSCOPY N/A 4/2/2018    Procedure: COLONOSCOPY;  Surgeon: Van Romano DO;  Location: [facility] ENDOSCOPY;  Service: Gastroenterology    DIAGNOSTIC LAPAROSCOPY  04/08/2008    Laparosc diagnostic (2)       ENDOSCOPY  03/26/2013    Colon endoscopy 10819 (2)       ENDOSCOPY N/A 4/2/2018    Procedure: ESOPHAGOGASTRODUODENOSCOPY;  Surgeon: Van Romano DO;  Location: [facility] ENDOSCOPY;  Service: Gastroenterology    ENDOSCOPY W/ PEG TUBE PLACEMENT  03/26/2013    EGD w/ tube 59951 (2)       EXCISION LESION  01/30/2013    EXC TR-EXT Benign+Irina 0.6-1 CM 81337 (1)       HAND TENOLYSIS  07/23/1992    Hand/finger surgery (1)       HEAD & NECK SKIN LESION EXCISIONAL BIOPSY  01/31/1994    Biopsy of Skin 53950 (2)       INCISION AND DRAINAGE BREAST ABSCESS  12/03/2012    Anesth, surgery of breast (1)       INJECTION OF MEDICATION  09/12/2011    B12 (1)       INJECTION OF MEDICATION  04/28/2015    Celestone (betamethasone) (1)      INJECTION OF MEDICATION  08/17/2015    Depo Medrol (Methylprednisone) (6)       INJECTION OF MEDICATION  06/08/2012    Kenalog (3)       INJECTION OF MEDICATION  06/08/2012    Toradol (1)       NASOLACRIMAL DUCT PROBING  08/16/1973    Probe nasolacrimal duct (1)       NEPHROSTOMY TRACT DILATATION W/ LITHOTRIPSY      PAP SMEAR  03/20/2008    PAP SMEAR (1)       TOTAL ABDOMINAL HYSTERECTOMY WITH SALPINGO OOPHORECTOMY  04/09/2012    ERLIN/BSO (1)       US GUIDED FINE NEEDLE ASPIRATION  6/26/2020    US GUIDED FINE NEEDLE ASPIRATION  7/17/2020     Social History:  reports that she has been smoking cigarettes. She has a 19.5  pack-year smoking history. She has never used smokeless tobacco. She reports that she does not currently use alcohol. She reports that she does not use drugs.    Family History: family history includes Cancer in her paternal aunt and another family member; Colon cancer in an other family member; Diabetes in her brother and another family member; Heart disease in an other family member; Hypertension in her mother and another family member; Lung cancer in her father and another family member.      Allergies:  Allergies   Allergen Reactions    Meat Extract Other (See Comments)     Beef,pork,lamb. All mammal meat  Causes pain    Contrast Dye (Echo Or Unknown Ct/Mr) Hives    Elemental Sulfur Rash    Iodinated Contrast Media Hives and Rash    Iron Nausea And Vomiting    Naproxen Rash    Penicillins Rash    Sulfa Antibiotics Rash    Tetracyclines & Related Rash     Medications:  Prior to Admission medications    Medication Sig Start Date End Date Taking? Authorizing Provider   albuterol sulfate HFA (Ventolin HFA) 108 (90 Base) MCG/ACT inhaler 2 puffs every 4 hours as needed for breathing 4/30/24  Yes Nick Neal MD   azelastine (ASTELIN) 0.1 % nasal spray 2 sprays into the nostril(s) as directed by provider 2 (Two) Times a Day. Use in each nostril as directed 9/28/23  Yes Joseph Chakraborty MD   azithromycin (Zithromax Z-Tim) 250 MG tablet Take 2 tablets by mouth on day 1, then 1 tablet daily on days 2-5 1/22/24  Yes Nick Neal MD   busPIRone (BUSPAR) 10 MG tablet Take 1 tablet by mouth 3 (Three) Times a Day. 11/20/23  Yes Nick Neal MD   carvedilol (COREG) 12.5 MG tablet Take 1 tablet by mouth 2 (Two) Times a Day With Meals. 1/31/24  Yes Nick Neal MD   EPINEPHrine (EPIPEN) 0.3 MG/0.3ML solution auto-injector injection Inject 0.3 mL into the appropriate muscle as directed by prescriber. 8/24/18  Yes Diane Lassiter MD   escitalopram (LEXAPRO) 20 MG  "tablet TAKE 1 TABLET BY MOUTH DAILY 2/21/19  Yes Diane Lassiter MD   fluorometholone (FML) 0.1 % ophthalmic suspension  1/18/20  Yes Diane Lassiter MD   fluticasone (FLONASE) 50 MCG/ACT nasal spray 2 sprays into the nostril(s) as directed by provider Daily. 9/13/23  Yes Nick Neal MD   Fluticasone-Salmeterol (ADVAIR DISKUS IN) Inhale.   Yes Diane Lassiter MD   HYDROcodone-acetaminophen (NORCO)  MG per tablet Take 1 tablet by mouth 3 (Three) Times a Day. 5/1/21  Yes Diane Lassiter MD   metFORMIN (GLUCOPHAGE) 500 MG tablet TAKE 1 TABLET BY MOUTH TWICE DAILY WITH MEALS 8/19/19  Yes Diane Lassiter MD   methylPREDNISolone (MEDROL) 4 MG dose pack Take as directed on package instructions. 4/30/24 5/14/24 Yes Nick Neal MD   montelukast (SINGULAIR) 10 MG tablet Take 1 tablet by mouth Every Night. 3/4/24  Yes Nick Neal MD   pantoprazole (PROTONIX) 40 MG EC tablet TAKE 1 TABLET EVERY MORNING BEFORE BREAKFAST 7/22/19  Yes Diane Lassiter MD   rosuvastatin (CRESTOR) 10 MG tablet Take 1 tablet by mouth Daily. 3/6/23  Yes Gopi Rodriguez MD   spironolactone (ALDACTONE) 25 MG tablet Take 1 tablet by mouth Daily. 9/13/23  Yes Nick Neal MD   Trokendi  MG capsule sustained-release 24 hr Take 200 mg by mouth Daily. 3/4/24  Yes Nick Neal MD   vitamin D (ERGOCALCIFEROL) 1.25 MG (90845 UT) capsule capsule Take 1 capsule by mouth 1 (One) Time Per Week. 9/13/23  Yes Nick Neal MD           Review of systems   negative unless otherwise specified above in HPI    Objective     Vital Signs: /87 (BP Location: Right arm, Patient Position: Sitting, Cuff Size: Adult)   Pulse 91   Ht 161.3 cm (63.5\")   Wt 61 kg (134 lb 6.4 oz)   BMI 23.43 kg/m²     Physical Exam  Vitals and nursing note reviewed.   Constitutional:       General: She is not in acute distress.     Appearance: Normal " appearance.   HENT:      Head: Normocephalic.   Eyes:      Extraocular Movements: Extraocular movements intact.      Pupils: Pupils are equal, round, and reactive to light.   Cardiovascular:      Rate and Rhythm: Normal rate and regular rhythm.      Heart sounds: Normal heart sounds. No murmur heard.  Pulmonary:      Effort: Pulmonary effort is normal. No respiratory distress.   Abdominal:      General: Abdomen is flat. Bowel sounds are normal.      Palpations: Abdomen is soft.   Neurological:      General: No focal deficit present.      Mental Status: She is alert.         BMI is within normal parameters. No other follow-up for BMI required.             Results Reviewed:  Glucose   Date Value Ref Range Status   05/09/2024 79 70 - 99 mg/dL Final   08/17/2023 93 65 - 99 mg/dL Final     BUN   Date Value Ref Range Status   05/09/2024 14 6 - 24 mg/dL Final   08/17/2023 9 6 - 20 mg/dL Final   07/09/2023 11 7 - 20 mg/dL Final     Creatinine   Date Value Ref Range Status   05/09/2024 0.88 0.57 - 1.00 mg/dL Final   08/17/2023 0.83 0.57 - 1.00 mg/dL Final   07/09/2023 0.78 0.52 - 1.25 mg/dL Final     Sodium   Date Value Ref Range Status   05/09/2024 139 134 - 144 mmol/L Final   08/17/2023 138 136 - 145 mmol/L Final   07/09/2023 138 137 - 145 mmol/L Final     Potassium   Date Value Ref Range Status   05/09/2024 4.6 3.5 - 5.2 mmol/L Final   08/17/2023 4.6 3.5 - 5.2 mmol/L Final   07/09/2023 4.0 3.5 - 5.1 mmol/L Final     Chloride   Date Value Ref Range Status   05/09/2024 106 96 - 106 mmol/L Final   08/17/2023 105 98 - 107 mmol/L Final   07/09/2023 110 (H) 98 - 107 mmol/L Final     CO2   Date Value Ref Range Status   08/17/2023 21.0 (L) 22.0 - 29.0 mmol/L Final     Total CO2   Date Value Ref Range Status   05/09/2024 18 (L) 20 - 29 mmol/L Final   07/09/2023 21 (L) 22 - 30 mmol/L Final     Calcium   Date Value Ref Range Status   05/09/2024 9.9 8.7 - 10.2 mg/dL Final   08/17/2023 10.0 8.6 - 10.5 mg/dL Final   07/09/2023 9.2 8.4  - 10.2 mg/dL Final     ALT (SGPT)   Date Value Ref Range Status   05/09/2024 18 0 - 32 IU/L Final   07/09/2023 30 9 - 72 U/L Final   08/15/2019 17 14 - 59 U/L Final     AST (SGOT)   Date Value Ref Range Status   05/09/2024 15 0 - 40 IU/L Final   07/09/2023 22 14 - 59 U/L Final   08/15/2019 14 (L) 15 - 37 U/L Final     WBC   Date Value Ref Range Status   05/09/2024 10.4 3.4 - 10.8 x10E3/uL Final   02/21/2019 9.2 4.0 - 11.0 10*3/uL Final     Hematocrit   Date Value Ref Range Status   05/09/2024 47.2 (H) 34.0 - 46.6 % Final   02/24/2023 43.5 34.0 - 46.6 % Final   02/21/2019 49.5 (H) 37.9 - 43.9 % Final     Platelets   Date Value Ref Range Status   05/09/2024 351 150 - 450 x10E3/uL Final   02/24/2023 287 140 - 450 10*3/mm3 Final   02/21/2019 220 150 - 450 10*3/uL Final     Total Cholesterol   Date Value Ref Range Status   06/12/2017 201 (H) 0 - 199 mg/dL Final     Triglycerides   Date Value Ref Range Status   05/09/2024 146 0 - 149 mg/dL Final   02/24/2023 118 10 - 150 mg/dL Final     HDL Cholesterol   Date Value Ref Range Status   05/09/2024 42 >39 mg/dL Final   02/24/2023 52 23 - 92 mg/dL Final     LDL Cholesterol    Date Value Ref Range Status   02/24/2023 160 mg/dL Final     Comment:         OPTIMAL: <100 mg/dl  LOW RISK: 100-129 mg/dl  BORDERLINE HIGH: 130-159 mg/dl  HIGH: 160-189 mg/dl  VERY HIGH: >189 mg/dl     LDL Chol Calc (NIH)   Date Value Ref Range Status   05/09/2024 48 0 - 99 mg/dL Final     LDL/HDL Ratio   Date Value Ref Range Status   06/12/2017 3.02 0.00 - 3.22 Final     Hemoglobin A1C   Date Value Ref Range Status   05/09/2024 6.3 (H) 4.8 - 5.6 % Final     Comment:              Prediabetes: 5.7 - 6.4           Diabetes: >6.4           Glycemic control for adults with diabetes: <7.0     06/16/2023 6.4 (H) <5.7 % Final     Comment:       5.7%-6.4% is diagnostic as prediabetes  6.5% or higher on two seperate test collections indicates Diabetes     The following data was reviewed by: Nick Johnson  MD Ángel on 05/09/2024:    Data reviewed : Radiologic studies cxr  covid negative        Assessment / Plan     Assessment/Plan:   Diagnosis Plan   1. Mild persistent asthma with acute exacerbation  levoFLOXacin (Levaquin) 500 MG tablet    fluconazole (Diflucan) 100 MG tablet    methylPREDNISolone (MEDROL) 4 MG dose pack    methylPREDNISolone Acetate suspension 4 mL    promethazine-dextromethorphan (PROMETHAZINE-DM) 6.25-15 MG/5ML syrup    Albuterol-Budesonide (Airsupra) 90-80 MCG/ACT aerosol    Albuterol-Budesonide (Airsupra) 90-80 MCG/ACT aerosol      2. Bronchitis  levoFLOXacin (Levaquin) 500 MG tablet    fluconazole (Diflucan) 100 MG tablet    methylPREDNISolone (MEDROL) 4 MG dose pack    methylPREDNISolone Acetate suspension 4 mL    promethazine-dextromethorphan (PROMETHAZINE-DM) 6.25-15 MG/5ML syrup    Budeson-Glycopyrrol-Formoterol (Breztri Aerosphere) 160-9-4.8 MCG/ACT aerosol inhaler            No follow-ups on file. unless patient needs to be seen sooner or acute issues arise.      I have discussed the patient results/orders and and plan/recommendation with them at today's visit.      Signed by:    Nick Neal MD Date: 06/12/24

## 2024-06-13 ENCOUNTER — TELEPHONE (OUTPATIENT)
Dept: INTERNAL MEDICINE | Facility: CLINIC | Age: 54
End: 2024-06-13
Payer: COMMERCIAL

## 2024-06-13 NOTE — TELEPHONE ENCOUNTER
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 ALSO REQUESTING SENT TO MAILING ADDRESS.      Timeframe paperwork needed: AS SOON AS POSSIBLE

## 2024-06-14 ENCOUNTER — TELEPHONE (OUTPATIENT)
Dept: INTERNAL MEDICINE | Facility: CLINIC | Age: 54
End: 2024-06-14
Payer: COMMERCIAL

## 2024-06-14 NOTE — TELEPHONE ENCOUNTER
Spoke to Vielka, documented encounter in patients chart.  This encounter was about a different pt and cannot be discussed on this encounter.

## 2024-07-05 NOTE — TELEPHONE ENCOUNTER
Caller: Vielka Pratt    Relationship: Self    Best call back number: [phone]     Requested Prescriptions:   Requested Prescriptions     Pending Prescriptions Disp Refills    pantoprazole (PROTONIX) 40 MG EC tablet       Sig: Take 1 tablet by mouth Every Morning Before Breakfast.        Pharmacy where request should be sent: [place], [place] - [address] [facility] [phone] [facility] [phone] FX     Last office visit with prescribing clinician: 6/12/2024   Last telemedicine visit with prescribing clinician: 4/30/2024   Next office visit with prescribing clinician: Visit date not found     Additional details provided by patient: COMPLETELY OUT     Does the patient have less than a 3 day supply:  [x] Yes  [] No    Would you like a call back once the refill request has been completed: [] Yes [x] No    If the office needs to give you a call back, can they leave a voicemail: [] Yes [x] No    Km Monahan Rep   07/05/24 15:39 CDT

## 2024-07-06 RX ORDER — PANTOPRAZOLE SODIUM 40 MG/1
40 TABLET, DELAYED RELEASE ORAL
Qty: 90 TABLET | Refills: 3 | Status: SHIPPED | OUTPATIENT
Start: 2024-07-06

## 2024-09-05 DIAGNOSIS — E87.70 HYPERVOLEMIA, UNSPECIFIED HYPERVOLEMIA TYPE: ICD-10-CM

## 2024-09-05 RX ORDER — SPIRONOLACTONE 25 MG/1
25 TABLET ORAL DAILY
Qty: 90 TABLET | Refills: 3 | Status: SHIPPED | OUTPATIENT
Start: 2024-09-05

## 2024-09-05 NOTE — TELEPHONE ENCOUNTER
Caller: Vielka Pratt    Relationship: Self    Best call back number: [phone]     Requested Prescriptions:   Requested Prescriptions     Pending Prescriptions Disp Refills    spironolactone (ALDACTONE) 25 MG tablet 90 tablet 3     Sig: Take 1 tablet by mouth Daily.        Pharmacy where request should be sent: [place], [place] - [address] [facility] [phone] [facility] [phone] FX     Last office visit with prescribing clinician: 6/12/2024   Last telemedicine visit with prescribing clinician: 4/30/2024   Next office visit with prescribing clinician: Visit date not found     Additional details provided by patient: PATIENT WOULD NEED TO  TODAY IF AT ALL POSSIBLE. HAS A FEW THAT PHARMACY GAVE HER. THEY ADVISED PATIENT THAT THEY HAVE REQUESTED THIS FOR HER BUT NEVER HEARD BACK    Does the patient have less than a 3 day supply:  [x] Yes  [] No    Would you like a call back once the refill request has been completed: [x] Yes [] No    If the office needs to give you a call back, can they leave a voicemail: [x] Yes [] No    Km Weiss Rep   09/05/24 13:25 CDT

## 2024-09-24 ENCOUNTER — TELEMEDICINE (OUTPATIENT)
Dept: INTERNAL MEDICINE | Facility: CLINIC | Age: 54
End: 2024-09-24
Payer: COMMERCIAL

## 2024-09-24 DIAGNOSIS — J40 BRONCHITIS: Primary | ICD-10-CM

## 2024-09-24 DIAGNOSIS — J44.1 COPD WITH EXACERBATION: ICD-10-CM

## 2024-09-24 PROCEDURE — 99213 OFFICE O/P EST LOW 20 MIN: CPT | Performed by: FAMILY MEDICINE

## 2024-09-24 RX ORDER — DEXTROMETHORPHAN HYDROBROMIDE AND PROMETHAZINE HYDROCHLORIDE 15; 6.25 MG/5ML; MG/5ML
5 SYRUP ORAL 4 TIMES DAILY PRN
Qty: 240 ML | Refills: 0 | Status: SHIPPED | OUTPATIENT
Start: 2024-09-24

## 2024-09-24 RX ORDER — AZITHROMYCIN 250 MG/1
TABLET, FILM COATED ORAL
Qty: 6 TABLET | Refills: 0 | Status: SHIPPED | OUTPATIENT
Start: 2024-09-24

## 2024-09-24 RX ORDER — BUDESONIDE, GLYCOPYRROLATE, AND FORMOTEROL FUMARATE 160; 9; 4.8 UG/1; UG/1; UG/1
2 AEROSOL, METERED RESPIRATORY (INHALATION) 2 TIMES DAILY
Qty: 1 EACH | Refills: 11 | Status: SHIPPED | OUTPATIENT
Start: 2024-09-24

## 2024-09-24 RX ORDER — METHYLPREDNISOLONE 4 MG
TABLET, DOSE PACK ORAL
Qty: 21 TABLET | Refills: 0 | Status: SHIPPED | OUTPATIENT
Start: 2024-09-24 | End: 2024-10-08

## 2024-10-07 ENCOUNTER — TELEPHONE (OUTPATIENT)
Dept: INTERNAL MEDICINE | Facility: CLINIC | Age: 54
End: 2024-10-07
Payer: COMMERCIAL

## 2024-10-07 NOTE — TELEPHONE ENCOUNTER
Any called from [facility] stating they faxed a form for pt's Breztri inhaler and they haven't received the refill.  I told her it was sent to [facility] and she said it shouldn't have been sent there.  I discussed with Any I did see the form and it was faxed and I didn't know how else to help, therefore I would have Martha call her back.    [phone]

## 2024-10-09 ENCOUNTER — FLU SHOT (OUTPATIENT)
Dept: INTERNAL MEDICINE | Facility: CLINIC | Age: 54
End: 2024-10-09
Payer: COMMERCIAL

## 2024-10-09 DIAGNOSIS — Z23 NEED FOR VACCINATION: Primary | ICD-10-CM

## 2024-10-09 PROCEDURE — 90656 IIV3 VACC NO PRSV 0.5 ML IM: CPT | Performed by: FAMILY MEDICINE

## 2024-10-09 PROCEDURE — 90471 IMMUNIZATION ADMIN: CPT | Performed by: FAMILY MEDICINE

## 2024-10-09 NOTE — PROGRESS NOTES
Injection  Flu Vaccine performed in right deltoid by Casandra Roblero LPN. Patient tolerated the procedure well without complications.  10/09/24   Casandra Roblero LPN

## 2024-10-14 ENCOUNTER — TELEPHONE (OUTPATIENT)
Dept: INTERNAL MEDICINE | Facility: CLINIC | Age: 54
End: 2024-10-14

## 2024-10-14 NOTE — TELEPHONE ENCOUNTER
Hub staff attempted to follow warm transfer process and was unsuccessful     Caller: SHRUTHI MEHTA    Relationship to patient: Other    Best call back number:     [phone]       Patient is needing:  TO SPEAK WITH [facility] ABOUT THE INHALER. STATES IT WAS MISSING THE DISPENSE QUANTITY. NEEDS IT FAXED BACK [phone]

## 2024-11-07 ENCOUNTER — TELEMEDICINE (OUTPATIENT)
Dept: INTERNAL MEDICINE | Facility: CLINIC | Age: 54
End: 2024-11-07
Payer: COMMERCIAL

## 2024-11-07 DIAGNOSIS — G62.9 NEUROPATHY: ICD-10-CM

## 2024-11-07 DIAGNOSIS — E11.9 TYPE 2 DIABETES MELLITUS WITHOUT COMPLICATION, WITHOUT LONG-TERM CURRENT USE OF INSULIN: ICD-10-CM

## 2024-11-07 DIAGNOSIS — G43.009 MIGRAINE WITHOUT AURA AND WITHOUT STATUS MIGRAINOSUS, NOT INTRACTABLE: Primary | ICD-10-CM

## 2024-11-07 PROCEDURE — 99214 OFFICE O/P EST MOD 30 MIN: CPT | Performed by: FAMILY MEDICINE

## 2024-11-07 RX ORDER — RIMEGEPANT SULFATE 75 MG/75MG
1 TABLET, ORALLY DISINTEGRATING ORAL EVERY OTHER DAY
Qty: 16 TABLET | Refills: 11 | Status: SHIPPED | OUTPATIENT
Start: 2024-11-07

## 2024-11-07 RX ORDER — LAMOTRIGINE 25 MG/1
25 TABLET ORAL DAILY
Qty: 30 TABLET | Refills: 11 | Status: SHIPPED | OUTPATIENT
Start: 2024-11-07

## 2024-11-07 NOTE — PROGRESS NOTES
Subjective     TeleHealth Visit Statement:    This visit was conducted using the audio and video through my chart implementation.  Mode of Visit: Video  Location of patient: -HOME-  Location of provider: +[facility]+  You have chosen to receive care through a telehealth visit.  The patient has signed the video visit consent form.  The visit included audio and video interaction. No technical issues occurred during this visit.     The patient verbalized understanding of the following:   - The visit would be conducted using audio/video and he/she consented to treatment.   - There is a possibility that the visit could disconnect or fail to connect and in that instance, it is possible that security protocols could fail causing a breach of privacy of his/her personal medical information.    - That if I determine the visit is not giving me sufficient information to make an appropriate clinical decision, I will require him/her to do a face to face visit in the clinic.    - That there would be a charge associated with the visit and agreed to pay any deductible or copay.    - That she could withdraw from the visit and request a face to face visit at any time.     I spent 35 minutes in the care of this patient during this telehealth encounter (including review of pertinent medical records, discussion with the patient, evaluation of patient problems and a coordination of a care plan as part of this visit).      I discussed with the patient the nature of our telemedicine visits, that:   - I would evaluate the patient and recommend diagnostics and treatments based on my assessment   - Our sessions are not being recorded and that personal health information is protected   - Our team would provide follow-up care and in person if/when the patient needs it     Physical examination was limited with exceptions as noted.   Two Factor Identifier was obtained (patient's name and )       No chief complaint on file.      History  of Present Illness    Vielka Pratt is a 54 y.o. female who presents for a routine office visit.  Vielka Pratt presents for follow-up of mixed tension and migraine headaches. Home treatment has included imitrex, maxalt, beta blockers, topomax, opiod analgesics, nsaids and acetominaphen, and otc medications with some improvement. Headaches are occurring every day. Generally, the headaches last about 4 hours. Work attendance or other daily activities are affected by the headaches. The patient denies decreased physical activity, dizziness, loss of balance, numbness of extremities, and vision problems.    Patient has not done well with her topiramate or the other medications and they are no longer as effective as they once were.  She would like to try something different at this time we discussed Emgality Ajovy but she would like to try Nurtec to see if this would be effective    Patient also complains of numbness particularly in right hand and bilateral lower extremities believe this to be related to neuropathy due to either her diabetes, migraine headaches, degenerative disc disease in her neck and/or back.  Unsure of the actual cause at this time we also discussed peripheral vascular disease but she does have a prior WALKER that is normal advised her that she would need to come in the office for us to check for vascular causes.  In the meantime we will start her on some Lamictal to see if this will help with nerve pain.    Patient's PMR from outside medical facility reviewed and noted.    Review of systems   negative unless otherwise specified above in HPI    Past Medical History:   Past Medical History:   Diagnosis Date    Abnormal granulation tissue     Acute bacterial pharyngitis     Acute tonsillitis, unspecified     Allergic asthma     IgE-mediated allergic asthma       Allergic rhinitis due to pollen     Anxiety     Asthma     Back pain     Backache     Breast lump     Cataracts, bilateral      Cellulitis of trunk     Postoperative    Chronic pain     Corns and callus     Cough     Depressive disorder     Diarrhea     Dysphagia     Dysuria     Endometriosis     Family history of malignant neoplasm of gastrointestinal tract     GERD (gastroesophageal reflux disease)     Hirsutism     Hypertension     Irritable bowel syndrome     Leukorrhea, not specified as infective     Mastitis     Migraine     Sebaceous cyst     Shortness of breath     Tick bite     Tobacco abuse disorder 7/6/2017    Tobacco user     Upper respiratory infection      Past Surgical History:  Past Surgical History:   Procedure Laterality Date    CATARACT EXTRACTION, BILATERAL      CHOLECYSTECTOMY      COLONOSCOPY N/A 4/2/2018    Procedure: COLONOSCOPY;  Surgeon: Van Romano DO;  Location: [facility] ENDOSCOPY;  Service: Gastroenterology    DIAGNOSTIC LAPAROSCOPY  04/08/2008    Laparosc diagnostic (2)       ENDOSCOPY  03/26/2013    Colon endoscopy 10497 (2)       ENDOSCOPY N/A 4/2/2018    Procedure: ESOPHAGOGASTRODUODENOSCOPY;  Surgeon: Van Romano DO;  Location: [facility] ENDOSCOPY;  Service: Gastroenterology    ENDOSCOPY W/ PEG TUBE PLACEMENT  03/26/2013    EGD w/ tube 02653 (2)       EXCISION LESION  01/30/2013    EXC TR-EXT Benign+Irina 0.6-1 CM 06008 (1)       HAND TENOLYSIS  07/23/1992    Hand/finger surgery (1)       HEAD & NECK SKIN LESION EXCISIONAL BIOPSY  01/31/1994    Biopsy of Skin 76473 (2)       INCISION AND DRAINAGE BREAST ABSCESS  12/03/2012    Anesth, surgery of breast (1)       INJECTION OF MEDICATION  09/12/2011    B12 (1)       INJECTION OF MEDICATION  04/28/2015    Celestone (betamethasone) (1)      INJECTION OF MEDICATION  08/17/2015    Depo Medrol (Methylprednisone) (6)       INJECTION OF MEDICATION  06/08/2012    Kenalog (3)       INJECTION OF MEDICATION  06/08/2012    Toradol (1)       NASOLACRIMAL DUCT PROBING  08/16/1973    Probe nasolacrimal duct (1)       NEPHROSTOMY TRACT DILATATION W/ LITHOTRIPSY      PAP  SMEAR  03/20/2008    PAP SMEAR (1)       TOTAL ABDOMINAL HYSTERECTOMY WITH SALPINGO OOPHORECTOMY  04/09/2012    ERLIN/BSO (1)       US GUIDED FINE NEEDLE ASPIRATION  6/26/2020    US GUIDED FINE NEEDLE ASPIRATION  7/17/2020     Social History:  reports that she has been smoking cigarettes. She has a 19.5 pack-year smoking history. She has never used smokeless tobacco. She reports that she does not currently use alcohol. She reports that she does not use drugs.    Family History: family history includes Cancer in her paternal aunt and another family member; Colon cancer in an other family member; Diabetes in her brother and another family member; Heart disease in an other family member; Hypertension in her mother and another family member; Lung cancer in her father and another family member.      Allergies:  Allergies   Allergen Reactions    Meat Extract Other (See Comments)     Beef,pork,lamb. All mammal meat  Causes pain    Contrast Dye (Echo Or Unknown Ct/Mr) Hives    Elemental Sulfur Rash    Iodinated Contrast Media Hives and Rash    Iron Nausea And Vomiting    Naproxen Rash    Penicillins Rash    Sulfa Antibiotics Rash    Tetracyclines & Related Rash     Medications:  Prior to Admission medications    Medication Sig Start Date End Date Taking? Authorizing Provider   albuterol sulfate HFA (Ventolin HFA) 108 (90 Base) MCG/ACT inhaler 2 puffs every 4 hours as needed for breathing 4/30/24   Nick Neal MD   Albuterol-Budesonide (Airsupra) 90-80 MCG/ACT aerosol Inhale 1 Inhalation Every 6 (Six) Hours As Needed (sob). 6/12/24   Nick Neal MD   Albuterol-Budesonide (Airsupra) 90-80 MCG/ACT aerosol Inhale 1 Inhalation Every 6 (Six) Hours As Needed (sob). 6/12/24   Nick Neal MD   azelastine (ASTELIN) 0.1 % nasal spray 2 sprays into the nostril(s) as directed by provider 2 (Two) Times a Day. Use in each nostril as directed 9/28/23   Joseph Chakraborty MD   azithromycin  (Zithromax Z-Tim) 250 MG tablet Take 2 tablets by mouth on day 1, then 1 tablet daily on days 2-5 9/24/24   Nick Neal MD   Budeson-Glycopyrrol-Formoterol (Breztri Aerosphere) 160-9-4.8 MCG/ACT aerosol inhaler Inhale 2 puffs 2 (Two) Times a Day. 9/24/24   Nick Neal MD   busPIRone (BUSPAR) 10 MG tablet Take 1 tablet by mouth 3 (Three) Times a Day. 11/20/23   Nick Neal MD   carvedilol (COREG) 12.5 MG tablet Take 1 tablet by mouth 2 (Two) Times a Day With Meals. 1/31/24   Nick Neal MD   EPINEPHrine (EPIPEN) 0.3 MG/0.3ML solution auto-injector injection Inject 0.3 mL into the appropriate muscle as directed by prescriber. 8/24/18   Diane Lassiter MD   escitalopram (LEXAPRO) 20 MG tablet Take 1 tablet by mouth Daily. 5/30/24   Nick Neal MD   fluorometholone (FML) 0.1 % ophthalmic suspension  1/18/20   Diane Lassiter MD   fluticasone (FLONASE) 50 MCG/ACT nasal spray 2 sprays into the nostril(s) as directed by provider Daily. 9/13/23   Nick Neal MD   HYDROcodone-acetaminophen (NORCO)  MG per tablet Take 1 tablet by mouth 3 (Three) Times a Day. 5/1/21   Diane Lassiter MD   loratadine-pseudoephedrine (Claritin-D 24 Hour)  MG per 24 hr tablet Take 1 tablet by mouth Daily. 6/12/24   Nick Neal MD   metFORMIN (GLUCOPHAGE) 500 MG tablet TAKE 1 TABLET BY MOUTH TWICE DAILY WITH MEALS 8/19/19   Diane Lassiter MD   methylPREDNISolone (MEDROL) 4 MG dose pack Take as directed on package instructions. 6/12/24   Nick Neal MD   montelukast (SINGULAIR) 10 MG tablet Take 1 tablet by mouth Every Night. 3/4/24   Nick Neal MD   nicotine (Nicoderm CQ) 14 MG/24HR patch Place 1 patch on the skin as directed by provider Daily. 5/9/24   Nick Neal MD   nicotine (Nicoderm CQ) 21 MG/24HR patch Place 1 patch on the skin as directed by provider Daily. 5/9/24    Nick Neal MD   nicotine (Nicoderm CQ) 7 MG/24HR patch Place 1 patch on the skin as directed by provider Daily. 5/9/24   Nick Neal MD   pantoprazole (PROTONIX) 40 MG EC tablet Take 1 tablet by mouth Every Morning Before Breakfast. 7/6/24   Nick Neal MD   promethazine-dextromethorphan (PROMETHAZINE-DM) 6.25-15 MG/5ML syrup Take 5 mL by mouth 4 (Four) Times a Day As Needed for Cough. 6/12/24   Nick Neal MD   promethazine-dextromethorphan (PROMETHAZINE-DM) 6.25-15 MG/5ML syrup Take 5 mL by mouth 4 (Four) Times a Day As Needed for Cough. 9/24/24   Nick Neal MD   rosuvastatin (CRESTOR) 10 MG tablet Take 1 tablet by mouth Daily. 3/6/23   Gopi Rodriguez MD   spironolactone (ALDACTONE) 25 MG tablet Take 1 tablet by mouth Daily. 9/5/24   Nick Neal MD   Trokendi  MG capsule sustained-release 24 hr Take 200 mg by mouth Daily. 3/4/24   Nick Neal MD   vitamin D (ERGOCALCIFEROL) 1.25 MG (51688 UT) capsule capsule Take 1 capsule by mouth 1 (One) Time Per Week. 9/13/23   Nick Neal MD       Objective     Telehealth Physical Exam Statement: In person physical examination was deferred        Results Reviewed:  Glucose   Date Value Ref Range Status   05/09/2024 79 70 - 99 mg/dL Final   08/17/2023 93 65 - 99 mg/dL Final     BUN   Date Value Ref Range Status   05/09/2024 14 6 - 24 mg/dL Final   08/17/2023 9 6 - 20 mg/dL Final   07/09/2023 11 7 - 20 mg/dL Final     Creatinine   Date Value Ref Range Status   05/09/2024 0.88 0.57 - 1.00 mg/dL Final   08/17/2023 0.83 0.57 - 1.00 mg/dL Final   07/09/2023 0.78 0.52 - 1.25 mg/dL Final     Sodium   Date Value Ref Range Status   05/09/2024 139 134 - 144 mmol/L Final   08/17/2023 138 136 - 145 mmol/L Final   07/09/2023 138 137 - 145 mmol/L Final     Potassium   Date Value Ref Range Status   05/09/2024 4.6 3.5 - 5.2 mmol/L Final   08/17/2023 4.6 3.5 - 5.2  mmol/L Final   07/09/2023 4.0 3.5 - 5.1 mmol/L Final     Chloride   Date Value Ref Range Status   05/09/2024 106 96 - 106 mmol/L Final   08/17/2023 105 98 - 107 mmol/L Final   07/09/2023 110 (H) 98 - 107 mmol/L Final     CO2   Date Value Ref Range Status   08/17/2023 21.0 (L) 22.0 - 29.0 mmol/L Final     Total CO2   Date Value Ref Range Status   05/09/2024 18 (L) 20 - 29 mmol/L Final   07/09/2023 21 (L) 22 - 30 mmol/L Final     Calcium   Date Value Ref Range Status   05/09/2024 9.9 8.7 - 10.2 mg/dL Final   08/17/2023 10.0 8.6 - 10.5 mg/dL Final   07/09/2023 9.2 8.4 - 10.2 mg/dL Final     ALT (SGPT)   Date Value Ref Range Status   05/09/2024 18 0 - 32 IU/L Final   07/09/2023 30 9 - 72 U/L Final   08/15/2019 17 14 - 59 U/L Final     AST (SGOT)   Date Value Ref Range Status   05/09/2024 15 0 - 40 IU/L Final   07/09/2023 22 14 - 59 U/L Final   08/15/2019 14 (L) 15 - 37 U/L Final     WBC   Date Value Ref Range Status   05/09/2024 10.4 3.4 - 10.8 x10E3/uL Final   02/21/2019 9.2 4.0 - 11.0 10*3/uL Final     Hematocrit   Date Value Ref Range Status   05/09/2024 47.2 (H) 34.0 - 46.6 % Final   02/24/2023 43.5 34.0 - 46.6 % Final   02/21/2019 49.5 (H) 37.9 - 43.9 % Final     Platelets   Date Value Ref Range Status   05/09/2024 351 150 - 450 x10E3/uL Final   02/24/2023 287 140 - 450 10*3/mm3 Final   02/21/2019 220 150 - 450 10*3/uL Final     Total Cholesterol   Date Value Ref Range Status   06/12/2017 201 (H) 0 - 199 mg/dL Final     Triglycerides   Date Value Ref Range Status   05/09/2024 146 0 - 149 mg/dL Final   02/24/2023 118 10 - 150 mg/dL Final     HDL Cholesterol   Date Value Ref Range Status   05/09/2024 42 >39 mg/dL Final   02/24/2023 52 23 - 92 mg/dL Final     LDL Cholesterol    Date Value Ref Range Status   02/24/2023 160 mg/dL Final     Comment:         OPTIMAL: <100 mg/dl  LOW RISK: 100-129 mg/dl  BORDERLINE HIGH: 130-159 mg/dl  HIGH: 160-189 mg/dl  VERY HIGH: >189 mg/dl     LDL Chol Calc ([facility])   Date Value Ref  Range Status   05/09/2024 48 0 - 99 mg/dL Final     LDL/HDL Ratio   Date Value Ref Range Status   06/12/2017 3.02 0.00 - 3.22 Final     Hemoglobin A1C   Date Value Ref Range Status   05/09/2024 6.3 (H) 4.8 - 5.6 % Final     Comment:              Prediabetes: 5.7 - 6.4           Diabetes: >6.4           Glycemic control for adults with diabetes: <7.0     06/16/2023 6.4 (H) <5.7 % Final     Comment:       5.7%-6.4% is diagnostic as prediabetes  6.5% or higher on two seperate test collections indicates Diabetes             Assessment / Plan     Assessment/Plan:   Diagnosis Plan   1. Migraine without aura and without status migrainosus, not intractable  Rimegepant Sulfate (Nurtec) 75 MG tablet dispersible tablet      2. Type 2 diabetes mellitus without complication, without long-term current use of insulin  Microalbumin / Creatinine Urine Ratio - Urine, Clean Catch    Lipid Panel    Comprehensive Metabolic Panel    Hemoglobin A1c      3. Neuropathy  lamoTRIgine (LaMICtal) 25 MG tablet            Return in about 4 weeks (around 12/5/2024) for Recheck. unless patient needs to be seen sooner or acute issues arise.      I have discussed the patient results/orders and and plan/recommendation with them at today's visit.      EMR Dictation/Transcription disclaimer:   Some of this note may be an electronic transcription/translation of spoken language to printed text. The electronic translation of spoken language may permit erroneous, or at times, nonsensical words or phrases to be inadvertently transcribed; Although I have reviewed the note for such errors, some may still exist.     Signed by:    Nick Neal MD Date: 11/07/24

## 2024-11-08 ENCOUNTER — TELEPHONE (OUTPATIENT)
Dept: INTERNAL MEDICINE | Facility: CLINIC | Age: 54
End: 2024-11-08
Payer: COMMERCIAL

## 2024-11-08 DIAGNOSIS — E11.9 TYPE 2 DIABETES MELLITUS WITHOUT COMPLICATION, WITHOUT LONG-TERM CURRENT USE OF INSULIN: Primary | ICD-10-CM

## 2024-11-14 DIAGNOSIS — G43.009 MIGRAINE WITHOUT AURA AND WITHOUT STATUS MIGRAINOSUS, NOT INTRACTABLE: ICD-10-CM

## 2024-11-14 RX ORDER — RIMEGEPANT SULFATE 75 MG/75MG
1 TABLET, ORALLY DISINTEGRATING ORAL EVERY OTHER DAY
Qty: 16 TABLET | Refills: 11 | Status: SHIPPED | OUTPATIENT
Start: 2024-11-14

## 2024-11-18 ENCOUNTER — TELEPHONE (OUTPATIENT)
Dept: INTERNAL MEDICINE | Facility: CLINIC | Age: 54
End: 2024-11-18

## 2024-11-18 NOTE — TELEPHONE ENCOUNTER
Caller: SHRUTHI    Relationship: Other- [employer]    Best call back number:     [phone] 8 AM- 5 PM CT (MORNING TIME IS BEST)     Who are you requesting to speak with (clinical staff, provider,  specific staff member): CLINICAL    What was the call regarding: [facility] TO FOLLOW UP ON THE [facility] REQUEST THAT WAS SENT VIA FAX ON 11/12/24. SHRUTHI STATES THE MEDICATION NEEDS TO BE SENT TO THE PARTNER PHARMACY LISTED ON THE FAX.   PLEASE ADVISE

## 2024-12-11 ENCOUNTER — TELEPHONE (OUTPATIENT)
Dept: INTERNAL MEDICINE | Facility: CLINIC | Age: 54
End: 2024-12-11
Payer: COMMERCIAL

## 2024-12-11 DIAGNOSIS — J45.31 MILD PERSISTENT ASTHMA WITH ACUTE EXACERBATION: ICD-10-CM

## 2024-12-11 DIAGNOSIS — J40 BRONCHITIS: ICD-10-CM

## 2024-12-11 RX ORDER — METHYLPREDNISOLONE 4 MG/1
TABLET ORAL
Qty: 21 TABLET | Refills: 0 | Status: SHIPPED | OUTPATIENT
Start: 2024-12-11

## 2024-12-11 RX ORDER — AZITHROMYCIN 250 MG/1
TABLET, FILM COATED ORAL
Qty: 6 TABLET | Refills: 0 | Status: SHIPPED | OUTPATIENT
Start: 2024-12-11

## 2024-12-11 NOTE — TELEPHONE ENCOUNTER
Caller: Vielka Pratt    Relationship: Self    Best call back number:   Telephone Information:   Mobile [phone]         What medication are you requesting: ANTIBIOTIC/STEROID    What are your current symptoms: SORE THROAT, SWOLLEN LYMPH NODES, COUGH    How long have you been experiencing symptoms: FEW DAYS    Have you had these symptoms before:    [x] Yes  [] No    Have you been treated for these symptoms before:   [x] Yes  [] No    If a prescription is needed, what is your preferred pharmacy and phone number:  [place], [place] - [address] [facility] [phone] [facility] [phone] FX     Additional notes: RATHER NOT BE SEEN. DESIRES ANTIBIOTIC AND STEROID TO HELP RELIEVE SYMPTOMS ASAP.

## 2024-12-12 ENCOUNTER — TELEPHONE (OUTPATIENT)
Dept: INTERNAL MEDICINE | Facility: CLINIC | Age: 54
End: 2024-12-12

## 2024-12-12 DIAGNOSIS — M25.50 ARTHRALGIA, UNSPECIFIED JOINT: Primary | ICD-10-CM

## 2024-12-12 NOTE — TELEPHONE ENCOUNTER
Caller: Vielka Pratt    Relationship to patient: Self    Best call back number: [phone]       Patient is needing: PAIN MANAGEMENT TOLD HER SHE NEEDED A GOUT BLOOD TEST. CAN YOU ORDER HER ONE? PLEASE LET PATIENT KNOW. [facility] [place]

## 2024-12-17 ENCOUNTER — TELEPHONE (OUTPATIENT)
Dept: INTERNAL MEDICINE | Facility: CLINIC | Age: 54
End: 2024-12-17

## 2024-12-17 NOTE — TELEPHONE ENCOUNTER
.    Caller: Vielka Pratt    Relationship: Self    Best call back number:     [phone] (Mobile)     What test was performed: Gout test results    When was the test performed: 12/13/24    Where was the test performed:formerly [facility] in [place]

## 2024-12-31 RX ORDER — ERGOCALCIFEROL 1.25 MG/1
50000 CAPSULE, LIQUID FILLED ORAL WEEKLY
Qty: 5 CAPSULE | Refills: 11 | Status: SHIPPED | OUTPATIENT
Start: 2024-12-31

## 2025-01-02 RX ORDER — METHYLPREDNISOLONE 4 MG/1
TABLET ORAL
Qty: 21 TABLET | Refills: 0 | Status: SHIPPED | OUTPATIENT
Start: 2025-01-02 | End: 2025-01-16

## 2025-01-02 RX ORDER — LEVOFLOXACIN 500 MG/1
500 TABLET, FILM COATED ORAL DAILY
Qty: 7 TABLET | Refills: 0 | Status: SHIPPED | OUTPATIENT
Start: 2025-01-02 | End: 2025-01-09

## 2025-02-03 ENCOUNTER — TELEPHONE (OUTPATIENT)
Dept: INTERNAL MEDICINE | Facility: CLINIC | Age: 55
End: 2025-02-03
Payer: COMMERCIAL

## 2025-02-03 NOTE — TELEPHONE ENCOUNTER
Anitra @  PHARMACY called ([phone] [phone].  She stated a PA needs sent for RAFALVAERICA.    Anitra said if you need to talk to her, you are welcome to call.

## 2025-02-05 DIAGNOSIS — I10 PRIMARY HYPERTENSION: ICD-10-CM

## 2025-02-05 RX ORDER — CARVEDILOL 12.5 MG/1
12.5 TABLET ORAL 2 TIMES DAILY WITH MEALS
Qty: 180 TABLET | Refills: 3 | Status: SHIPPED | OUTPATIENT
Start: 2025-02-05

## 2025-02-05 RX ORDER — PANTOPRAZOLE SODIUM 40 MG/1
40 TABLET, DELAYED RELEASE ORAL
Qty: 90 TABLET | Refills: 3 | Status: SHIPPED | OUTPATIENT
Start: 2025-02-05

## 2025-02-05 RX ORDER — ROSUVASTATIN CALCIUM 10 MG/1
10 TABLET, COATED ORAL DAILY
Qty: 90 TABLET | Refills: 3 | Status: SHIPPED | OUTPATIENT
Start: 2025-02-05

## 2025-02-05 RX ORDER — ESCITALOPRAM OXALATE 20 MG/1
20 TABLET ORAL DAILY
Qty: 90 TABLET | Refills: 3 | Status: SHIPPED | OUTPATIENT
Start: 2025-02-05

## 2025-02-05 RX ORDER — BUSPIRONE HYDROCHLORIDE 10 MG/1
10 TABLET ORAL 3 TIMES DAILY
Qty: 270 TABLET | Refills: 3 | Status: SHIPPED | OUTPATIENT
Start: 2025-02-05

## 2025-02-05 NOTE — TELEPHONE ENCOUNTER
Caller: Vielka Pratt    Relationship: Self    Best call back number: [phone]     Requested Prescriptions:   Requested Prescriptions     Pending Prescriptions Disp Refills    busPIRone (BUSPAR) 10 MG tablet 90 tablet 11     Sig: Take 1 tablet by mouth 3 (Three) Times a Day.    carvedilol (COREG) 12.5 MG tablet 60 tablet 11     Sig: Take 1 tablet by mouth 2 (Two) Times a Day With Meals.    metFORMIN (GLUCOPHAGE) 500 MG tablet       Sig: Take 1 tablet by mouth 2 (Two) Times a Day With Meals.    rosuvastatin (CRESTOR) 10 MG tablet 90 tablet 3     Sig: Take 1 tablet by mouth Daily.    pantoprazole (PROTONIX) 40 MG EC tablet 90 tablet 3     Sig: Take 1 tablet by mouth Every Morning Before Breakfast.    escitalopram (LEXAPRO) 20 MG tablet 90 tablet 3     Sig: Take 1 tablet by mouth Daily.        Pharmacy where request should be sent: [place], [place] - [address] [place] [address] [phone] [facility] [phone] FX     Last office visit with prescribing clinician: 6/12/2024   Last telemedicine visit with prescribing clinician: 11/7/2024   Next office visit with prescribing clinician: Visit date not found     Additional details provided by patient: PATIENT REQUESTS THE OFFICE TO CALL THE PHARMACY AND SEE WHAT ELSE MAY NEED TO BE REFILLED.     Does the patient have less than a 3 day supply:  [x] Yes  [] No    Would you like a call back once the refill request has been completed: [] Yes [x] No    If the office needs to give you a call back, can they leave a voicemail: [] Yes [x] No    Km Allen Rep   02/05/25 11:47 CST

## 2025-02-07 ENCOUNTER — TELEPHONE (OUTPATIENT)
Dept: INTERNAL MEDICINE | Facility: CLINIC | Age: 55
End: 2025-02-07
Payer: COMMERCIAL

## 2025-02-07 RX ORDER — CLINDAMYCIN HYDROCHLORIDE 300 MG/1
300 CAPSULE ORAL 3 TIMES DAILY
Qty: 21 CAPSULE | Refills: 0 | Status: SHIPPED | OUTPATIENT
Start: 2025-02-07 | End: 2025-02-14

## 2025-02-07 NOTE — TELEPHONE ENCOUNTER
Caller: Vielka Pratt    Relationship: Self    Best call back number:   [phone]    What medication are you requesting: ANTIBIOTICS FOR SINUS INFECTION     What are your current symptoms: HOARSE CONGESTION A LOT OF SINUS DRAINAGE YELLOW IN COLOR SINUS PRESSURE AND HEADACHE     How long have you been experiencing symptoms:  OVER A WEEK NOW     Have you had these symptoms before:    [x] Yes  [] No    Have you been treated for these symptoms before:   [x] Yes  [] No    If a prescription is needed, what is your preferred pharmacy and phone number: [place], [place] - [address] [facility] [phone] [facility] [phone] FX

## 2025-02-07 NOTE — TELEPHONE ENCOUNTER
Patient has not been seen since November. She can do a [place] video visit with Titus this morning if she would like since Dr. Neal is not in clinic.

## 2025-03-26 RX ORDER — MONTELUKAST SODIUM 10 MG/1
10 TABLET ORAL NIGHTLY
Qty: 90 TABLET | Refills: 1 | Status: SHIPPED | OUTPATIENT
Start: 2025-03-26

## 2025-03-26 RX ORDER — TOPIRAMATE 200 MG/1
200 CAPSULE, EXTENDED RELEASE ORAL DAILY
Qty: 90 CAPSULE | Refills: 1 | Status: SHIPPED | OUTPATIENT
Start: 2025-03-26

## 2025-04-03 ENCOUNTER — OFFICE VISIT (OUTPATIENT)
Dept: INTERNAL MEDICINE | Facility: CLINIC | Age: 55
End: 2025-04-03
Payer: COMMERCIAL

## 2025-04-03 VITALS
BODY MASS INDEX: 24.92 KG/M2 | WEIGHT: 146 LBS | HEIGHT: 64 IN | TEMPERATURE: 97.7 F | OXYGEN SATURATION: 98 % | HEART RATE: 73 BPM | DIASTOLIC BLOOD PRESSURE: 77 MMHG | SYSTOLIC BLOOD PRESSURE: 121 MMHG

## 2025-04-03 DIAGNOSIS — Z12.31 SCREENING MAMMOGRAM FOR BREAST CANCER: ICD-10-CM

## 2025-04-03 DIAGNOSIS — Z23 NEED FOR PNEUMOCOCCAL VACCINATION: ICD-10-CM

## 2025-04-03 DIAGNOSIS — Z11.59 ENCOUNTER FOR HEPATITIS C SCREENING TEST FOR LOW RISK PATIENT: Primary | ICD-10-CM

## 2025-04-03 DIAGNOSIS — Z00.00 ANNUAL PHYSICAL EXAM: ICD-10-CM

## 2025-04-03 DIAGNOSIS — J01.00 ACUTE MAXILLARY SINUSITIS, RECURRENCE NOT SPECIFIED: ICD-10-CM

## 2025-04-03 DIAGNOSIS — J44.1 COPD WITH EXACERBATION: ICD-10-CM

## 2025-04-03 DIAGNOSIS — J45.31 MILD PERSISTENT ASTHMA WITH ACUTE EXACERBATION: ICD-10-CM

## 2025-04-03 DIAGNOSIS — J40 BRONCHITIS: ICD-10-CM

## 2025-04-03 DIAGNOSIS — E11.9 TYPE 2 DIABETES MELLITUS WITHOUT COMPLICATION, WITHOUT LONG-TERM CURRENT USE OF INSULIN: ICD-10-CM

## 2025-04-03 DIAGNOSIS — R53.82 CHRONIC FATIGUE: ICD-10-CM

## 2025-04-03 DIAGNOSIS — T78.40XD ALLERGY, SUBSEQUENT ENCOUNTER: ICD-10-CM

## 2025-04-03 DIAGNOSIS — R05.9 COUGH, UNSPECIFIED TYPE: ICD-10-CM

## 2025-04-03 RX ORDER — BUDESONIDE, GLYCOPYRROLATE, AND FORMOTEROL FUMARATE 160; 9; 4.8 UG/1; UG/1; UG/1
2 AEROSOL, METERED RESPIRATORY (INHALATION) 2 TIMES DAILY
Start: 2025-04-03

## 2025-04-03 RX ORDER — LEVOCETIRIZINE DIHYDROCHLORIDE 5 MG/1
5 TABLET, FILM COATED ORAL EVERY EVENING
Qty: 90 TABLET | Refills: 3 | Status: SHIPPED | OUTPATIENT
Start: 2025-04-03

## 2025-04-03 RX ORDER — CLINDAMYCIN HYDROCHLORIDE 300 MG/1
300 CAPSULE ORAL 3 TIMES DAILY
Qty: 21 CAPSULE | Refills: 0 | Status: SHIPPED | OUTPATIENT
Start: 2025-04-03 | End: 2025-04-10

## 2025-04-03 RX ORDER — ALBUTEROL SULFATE AND BUDESONIDE 90; 80 UG/1; UG/1
1 AEROSOL, METERED RESPIRATORY (INHALATION) EVERY 6 HOURS PRN
Qty: 10.7 G | Refills: 11 | Status: SHIPPED | OUTPATIENT
Start: 2025-04-03

## 2025-04-03 RX ORDER — METHYLPREDNISOLONE SODIUM SUCCINATE 125 MG/2ML
125 INJECTION INTRAMUSCULAR; INTRAVENOUS ONCE
Status: COMPLETED | OUTPATIENT
Start: 2025-04-03 | End: 2025-04-03

## 2025-04-03 RX ADMIN — METHYLPREDNISOLONE SODIUM SUCCINATE 125 MG: 125 INJECTION INTRAMUSCULAR; INTRAVENOUS at 10:15

## 2025-04-03 NOTE — PROGRESS NOTES
Subjective     Chief Complaint   Patient presents with    Asthma       History of Present Illness    Patient's PMR from outside medical facility reviewed and noted.    Vielka Pratt is a 55 y.o. female who presents for an annual physical.  I discussed at preventative health care and cancer screening with her and its role in reducing types of cancer and other health problems appropriate for the patient's age and gender.  Also discussed vaccine status with the patient with current age related guidelines.  Patient understands the risks and benefits of preventative screening and immunization.  she has chosen to go ahead and obtain Annual wellness exam, CBC, CMP, TSH, Hba1c, Lipid profile, Hep C screening, and Mammogram or breast cancer screening at this time.    Patient presents for follow up of Diabetes type 2. Current symptoms include: none. Patient denies polyuria, visual disturbances, and vomiting.  Home sugars: patient does not check sugars. Current treatments: no recent interventions. Patient is due for her diabetic labs at this time, so we will go ahead and order Cmp, HBa1c, Lipid Profile, and Microalbumin urine for evaluation.     Diabetic foot exam:   Left: Filament test present   Pulses Dorsalis Pedis:  present  Posterior Tibial:  present   Reflexes 2+    Vibratory sensation normal   Proprioception normal   Sharp/dull discrimination normal       Right: Filament test present   Pulses Dorsalis Pedis:  present  Posterior Tibial:  present   Reflexes 2+    Vibratory sensation normal   Proprioception normal   Sharp/dull discrimination normal     Patient also has a history of COPD and asthma.  Patient states that she has had a chronic cough that is been ongoing for the last several months she is concerned that her COPD could be getting worse oxygen does look good at this time.  Go ahead and keep her on her long-acting inhaler which she has not been using she also believes that she has Norval albuterol  and not the Airsupra so we will resend this and at this time.  Will get a chest x-ray to evaluate this today.    The patient states she has significant head congestion with mild associated cough and congestion.   Drainage has been yellowish green in color, as well as any coughed up sputum..  There has been some facial pain and pressure.  Patient reports no fever, myalgias, nausea, vomiting or diarrhea associated with this at this time.  Patient reports that this has been going on for over a month days at this point, and would like something to help with their sinusitis at this time.  Since I believe that this is a lot of this is generated by her allergies we will go ahead and start her on some Xyzal instead of the Zyrtec she has been using.  Patient is also instructed to start using her Flonase on a regular basis to help with this.          Past Medical History:   Past Medical History:   Diagnosis Date    Abnormal granulation tissue     Acute bacterial pharyngitis     Acute tonsillitis, unspecified     Allergic asthma     IgE-mediated allergic asthma       Allergic rhinitis due to pollen     Anxiety     Asthma     Back pain     Backache     Breast lump     Cataracts, bilateral     Cellulitis of trunk     Postoperative    Chronic pain     Corns and callus     Cough     Depressive disorder     Diarrhea     Dysphagia     Dysuria     Endometriosis     Family history of malignant neoplasm of gastrointestinal tract     GERD (gastroesophageal reflux disease)     Hirsutism     Hypertension     Irritable bowel syndrome     Leukorrhea, not specified as infective     Mastitis     Migraine     Sebaceous cyst     Shortness of breath     Tick bite     Tobacco abuse disorder 7/6/2017    Tobacco user     Upper respiratory infection      Past Surgical History:  Past Surgical History:   Procedure Laterality Date    CATARACT EXTRACTION, BILATERAL      CHOLECYSTECTOMY      COLONOSCOPY N/A 4/2/2018    Procedure: COLONOSCOPY;  Surgeon:  Van Romano DO;  Location: [facility] ENDOSCOPY;  Service: Gastroenterology    DIAGNOSTIC LAPAROSCOPY  04/08/2008    Laparosc diagnostic (2)       ENDOSCOPY  03/26/2013    Colon endoscopy 33475 (2)       ENDOSCOPY N/A 4/2/2018    Procedure: ESOPHAGOGASTRODUODENOSCOPY;  Surgeon: Van Romano DO;  Location: [facility] ENDOSCOPY;  Service: Gastroenterology    ENDOSCOPY W/ PEG TUBE PLACEMENT  03/26/2013    EGD w/ tube 61301 (2)       EXCISION LESION  01/30/2013    EXC TR-EXT Benign+Irina 0.6-1 CM 17383 (1)       HAND TENOLYSIS  07/23/1992    Hand/finger surgery (1)       HEAD & NECK SKIN LESION EXCISIONAL BIOPSY  01/31/1994    Biopsy of Skin 57061 (2)       INCISION AND DRAINAGE BREAST ABSCESS  12/03/2012    Anesth, surgery of breast (1)       INJECTION OF MEDICATION  09/12/2011    B12 (1)       INJECTION OF MEDICATION  04/28/2015    Celestone (betamethasone) (1)      INJECTION OF MEDICATION  08/17/2015    Depo Medrol (Methylprednisone) (6)       INJECTION OF MEDICATION  06/08/2012    Kenalog (3)       INJECTION OF MEDICATION  06/08/2012    Toradol (1)       NASOLACRIMAL DUCT PROBING  08/16/1973    Probe nasolacrimal duct (1)       NEPHROSTOMY TRACT DILATATION W/ LITHOTRIPSY      PAP SMEAR  03/20/2008    PAP SMEAR (1)       TOTAL ABDOMINAL HYSTERECTOMY WITH SALPINGO OOPHORECTOMY  04/09/2012    ERLIN/BSO (1)       US GUIDED FINE NEEDLE ASPIRATION  6/26/2020    US GUIDED FINE NEEDLE ASPIRATION  7/17/2020     Social History:  reports that she has been smoking cigarettes. She has a 19.5 pack-year smoking history. She has never used smokeless tobacco. She reports that she does not currently use alcohol. She reports that she does not use drugs.    Family History: family history includes Cancer in her paternal aunt and another family member; Colon cancer in an other family member; Diabetes in her brother and another family member; Heart disease in an other family member; Hypertension in her mother and another family member;  Lung cancer in her father and another family member.      Allergies:  Allergies   Allergen Reactions    Meat Extract Other (See Comments)     Beef,pork,lamb. All mammal meat  Causes pain    Contrast Dye (Echo Or Unknown Ct/Mr) Hives    Elemental Sulfur Rash    Iodinated Contrast Media Hives and Rash    Iron Nausea And Vomiting    Naproxen Rash    Penicillins Rash    Sulfa Antibiotics Rash    Tetracyclines & Related Rash     Medications:  Prior to Admission medications    Medication Sig Start Date End Date Taking? Authorizing Provider   albuterol sulfate HFA (Ventolin HFA) 108 (90 Base) MCG/ACT inhaler 2 puffs every 4 hours as needed for breathing 4/30/24  Yes Nick Neal MD   Albuterol-Budesonide (Airsupra) 90-80 MCG/ACT aerosol Inhale 1 Inhalation Every 6 (Six) Hours As Needed (sob). 6/12/24  Yes Nick Neal MD   Albuterol-Budesonide (Airsupra) 90-80 MCG/ACT aerosol Inhale 1 Inhalation Every 6 (Six) Hours As Needed (sob). 6/12/24  Yes Nick Neal MD   azelastine (ASTELIN) 0.1 % nasal spray 2 sprays into the nostril(s) as directed by provider 2 (Two) Times a Day. Use in each nostril as directed 9/28/23  Yes Joseph Chakraborty MD   azithromycin (Zithromax Z-Tim) 250 MG tablet Take 2 tablets by mouth on day 1, then 1 tablet daily on days 2-5 12/11/24  Yes Nick Neal MD   Budeson-Glycopyrrol-Formoterol (Breztri Aerosphere) 160-9-4.8 MCG/ACT aerosol inhaler Inhale 2 puffs 2 (Two) Times a Day. 9/24/24  Yes Nick Neal MD   busPIRone (BUSPAR) 10 MG tablet Take 1 tablet by mouth 3 (Three) Times a Day. 2/5/25  Yes Nick Neal MD   carvedilol (COREG) 12.5 MG tablet Take 1 tablet by mouth 2 (Two) Times a Day With Meals. 2/5/25  Yes Nick Neal MD   EPINEPHrine (EPIPEN) 0.3 MG/0.3ML solution auto-injector injection Inject 0.3 mL into the appropriate muscle as directed by prescriber. 8/24/18  Yes Provider, MD Diane    escitalopram (LEXAPRO) 20 MG tablet Take 1 tablet by mouth Daily. 2/5/25  Yes Nick Neal MD   fluorometholone (FML) 0.1 % ophthalmic suspension  1/18/20  Yes Diane Lassiter MD   fluticasone (FLONASE) 50 MCG/ACT nasal spray 2 sprays into the nostril(s) as directed by provider Daily. 9/13/23  Yes Nick Neal MD   HYDROcodone-acetaminophen (NORCO)  MG per tablet Take 1 tablet by mouth 3 (Three) Times a Day. 5/1/21  Yes Diane Lassiter MD   lamoTRIgine (LaMICtal) 25 MG tablet Take 1 tablet by mouth Daily. 11/7/24  Yes Nick Neal MD   loratadine-pseudoephedrine (Claritin-D 24 Hour)  MG per 24 hr tablet Take 1 tablet by mouth Daily. 6/12/24  Yes Nick Neal MD   metFORMIN (GLUCOPHAGE) 500 MG tablet Take 1 tablet by mouth 2 (Two) Times a Day With Meals. 2/5/25  Yes Nick Neal MD   methylPREDNISolone (MEDROL) 4 MG dose pack Take as directed on package instructions. 12/11/24  Yes Nick Neal MD   montelukast (SINGULAIR) 10 MG tablet Take 1 tablet by mouth Every Night. 3/26/25  Yes Nick Neal MD   nicotine (Nicoderm CQ) 14 MG/24HR patch Place 1 patch on the skin as directed by provider Daily. 5/9/24  Yes Nick Neal MD   nicotine (Nicoderm CQ) 21 MG/24HR patch Place 1 patch on the skin as directed by provider Daily. 5/9/24  Yes Nick Neal MD   nicotine (Nicoderm CQ) 7 MG/24HR patch Place 1 patch on the skin as directed by provider Daily. 5/9/24  Yes Nick Neal MD   pantoprazole (PROTONIX) 40 MG EC tablet Take 1 tablet by mouth Every Morning Before Breakfast. 2/5/25  Yes Nick Neal MD   promethazine-dextromethorphan (PROMETHAZINE-DM) 6.25-15 MG/5ML syrup Take 5 mL by mouth 4 (Four) Times a Day As Needed for Cough. 6/12/24  Yes Nick Neal MD   promethazine-dextromethorphan (PROMETHAZINE-DM) 6.25-15 MG/5ML syrup Take 5 mL by  "mouth 4 (Four) Times a Day As Needed for Cough. 9/24/24  Yes Nick Neal MD   Rimegepant Sulfate (Nurtec) 75 MG tablet dispersible tablet Take 1 tablet by mouth Every Other Day. 11/14/24  Yes Nick Neal MD   rosuvastatin (CRESTOR) 10 MG tablet Take 1 tablet by mouth Daily. 2/5/25  Yes Nick Neal MD   spironolactone (ALDACTONE) 25 MG tablet Take 1 tablet by mouth Daily. 9/5/24  Yes Nick Neal MD   Trokendi  MG capsule sustained-release 24 hr Take 200 mg by mouth Daily. 3/26/25  Yes Nick Neal MD   vitamin D (ERGOCALCIFEROL) 1.25 MG (34271 UT) capsule capsule Take 1 capsule by mouth 1 (One) Time Per Week. 12/31/24  Yes Nick Neal MD       NACHO: Over the last two weeks, how often have you been bothered by the following problems?  Feeling nervous, anxious or on edge: Not at all  Not being able to stop or control worrying: Not at all  Worrying too much about different things: Not at all  Trouble Relaxing: Not at all  Being so restless that it is hard to sit still: Not at all  Becoming easily annoyed or irritable: Not at all  Feeling afraid as if something awful might happen: Not at all  NACHO 7 Total Score: 0  If you checked any problems, how difficult have these problems made it for you to do your work, take care of things at home, or get along with other people: Not difficult at all    PHQ:  Little interest or pleasure in doing things? Not at all   Feeling down, depressed, or hopeless? Not at all   PHQ-2 Total Score 0         0 (Negative screening for depression)  Support given, observe for worsening symptoms        Review of systems   negative unless otherwise specified above in HPI    Objective     Vital Signs: /77 (BP Location: Left arm, Patient Position: Sitting, Cuff Size: Adult)   Pulse 73   Temp 97.7 °F (36.5 °C) (Infrared)   Ht 161.3 cm (63.5\")   Wt 66.2 kg (146 lb)   SpO2 98%   BMI 25.46 kg/m²     Physical " Exam  Vitals and nursing note reviewed.   Constitutional:       General: She is not in acute distress.     Appearance: Normal appearance.   HENT:      Head: Normocephalic.   Eyes:      Extraocular Movements: Extraocular movements intact.      Pupils: Pupils are equal, round, and reactive to light.   Cardiovascular:      Rate and Rhythm: Normal rate and regular rhythm.      Heart sounds: Normal heart sounds. No murmur heard.  Pulmonary:      Effort: Pulmonary effort is normal. No respiratory distress.      Breath sounds: Normal breath sounds. No rhonchi or rales.   Abdominal:      General: Abdomen is flat. Bowel sounds are normal.      Palpations: Abdomen is soft.   Neurological:      General: No focal deficit present.      Mental Status: She is alert.         BMI is >= 25 and <30. (Overweight) The following options were offered after discussion;: weight loss educational material (shared in after visit summary)      Results Reviewed:  Glucose   Date Value Ref Range Status   05/09/2024 79 70 - 99 mg/dL Final   08/17/2023 93 65 - 99 mg/dL Final     BUN   Date Value Ref Range Status   11/08/2024 14 7 - 25 mg/dL Final     Creatinine   Date Value Ref Range Status   11/08/2024 0.9 0.7 - 1.3 mg/dL Final     Sodium   Date Value Ref Range Status   11/08/2024 142 136 - 145 mmol/L Final     Potassium   Date Value Ref Range Status   11/08/2024 4.2 3.5 - 5.1 mmol/L Final     Chloride   Date Value Ref Range Status   11/08/2024 111 (H) 98 - 107 mmol/L Final     Total CO2   Date Value Ref Range Status   11/08/2024 23 21 - 31 mmol/L Final     Calcium   Date Value Ref Range Status   11/08/2024 9.4 8.6 - 10.3 mg/dL Final     ALT (SGPT)   Date Value Ref Range Status   11/08/2024 9 7 - 52 U/L Final   08/15/2019 17 14 - 59 U/L Final     AST (SGOT)   Date Value Ref Range Status   11/08/2024 12 (L) 13 - 39 U/L Final   08/15/2019 14 (L) 15 - 37 U/L Final     WBC   Date Value Ref Range Status   05/09/2024 10.4 3.4 - 10.8 x10E3/uL Final    02/21/2019 9.2 4.0 - 11.0 10*3/uL Final     Hematocrit   Date Value Ref Range Status   05/09/2024 47.2 (H) 34.0 - 46.6 % Final   02/24/2023 43.5 34.0 - 46.6 % Final   02/21/2019 49.5 (H) 37.9 - 43.9 % Final     Platelets   Date Value Ref Range Status   05/09/2024 351 150 - 450 x10E3/uL Final   02/24/2023 287 140 - 450 10*3/mm3 Final   02/21/2019 220 150 - 450 10*3/uL Final     Total Cholesterol   Date Value Ref Range Status   06/12/2017 201 (H) 0 - 199 mg/dL Final     Triglycerides   Date Value Ref Range Status   11/08/2024 138 10 - 150 mg/dL Final     HDL Cholesterol   Date Value Ref Range Status   11/08/2024 39 23 - 92 mg/dL Final     LDL Cholesterol    Date Value Ref Range Status   11/08/2024 50 mg/dL Final     Comment:                                                                                                                                                                                                                                                                                               OPTIMAL: <100 mg/dl  LOW RISK: 100-129 mg/dl  BORDERLINE HIGH: 130-159 mg/dl  HIGH: 160-189 mg/dl  VERY HIGH: >189 mg/dl     LDL/HDL Ratio   Date Value Ref Range Status   06/12/2017 3.02 0.00 - 3.22 Final     Hemoglobin A1C   Date Value Ref Range Status   11/08/2024 6.1 (H) <5.7 % Final     Comment:                                                        5.7%-6.4% is diagnostic as prediabetes  6.5% or higher on two seperate test collections indicates Diabetes             Procedure   Procedures       Assessment / Plan     Assessment/Plan:   Diagnosis Plan   1. Encounter for hepatitis C screening test for low risk patient  Hepatitis C Antibody      2. Need for pneumococcal vaccination        3. Screening mammogram for breast cancer  Mammo Screening Digital Tomosynthesis Bilateral With CAD      4. Type 2 diabetes mellitus without complication, without long-term current use of insulin  Microalbumin / Creatinine Urine  Ratio - Urine, Clean Catch    Lipid Panel    Comprehensive Metabolic Panel    Hemoglobin A1c      5. Annual physical exam  Lipid Panel    Comprehensive Metabolic Panel    Hemoglobin A1c    CBC & Differential    TSH      6. Mild persistent asthma with acute exacerbation  Albuterol-Budesonide (Airsupra) 90-80 MCG/ACT aerosol      7. Cough, unspecified type  XR Chest PA & Lateral (In Office)    methylPREDNISolone sodium succinate (SOLU-Medrol) injection 125 mg      8. Bronchitis  Budeson-Glycopyrrol-Formoterol (Breztri Aerosphere) 160-9-4.8 MCG/ACT aerosol inhaler      9. COPD with exacerbation  Budeson-Glycopyrrol-Formoterol (Breztri Aerosphere) 160-9-4.8 MCG/ACT aerosol inhaler    methylPREDNISolone sodium succinate (SOLU-Medrol) injection 125 mg      10. Acute maxillary sinusitis, recurrence not specified        11. Allergy, subsequent encounter  levocetirizine (XYZAL) 5 MG tablet      12. Chronic fatigue  Vitamin B12    Vitamin D 25 hydroxy            No follow-ups on file. unless patient needs to be seen sooner or acute issues arise.      I have discussed the patient results/orders and and plan/recommendation with them at today's visit.      Signed by:    Nick Neal MD Date: 04/03/25

## 2025-04-04 LAB
25(OH)D3+25(OH)D2 SERPL-MCNC: 54.3 NG/ML (ref 30–100)
ALBUMIN SERPL-MCNC: 4.4 G/DL (ref 3.8–4.9)
ALBUMIN/CREAT UR: <5 MG/G CREAT (ref 0–29)
ALP SERPL-CCNC: 113 IU/L (ref 44–121)
ALT SERPL-CCNC: 14 IU/L (ref 0–32)
AST SERPL-CCNC: 11 IU/L (ref 0–40)
BASOPHILS # BLD AUTO: 0.1 X10E3/UL (ref 0–0.2)
BASOPHILS NFR BLD AUTO: 1 %
BILIRUB SERPL-MCNC: 0.3 MG/DL (ref 0–1.2)
BUN SERPL-MCNC: 10 MG/DL (ref 6–24)
BUN/CREAT SERPL: 13 (ref 9–23)
CALCIUM SERPL-MCNC: 9.5 MG/DL (ref 8.7–10.2)
CHLORIDE SERPL-SCNC: 107 MMOL/L (ref 96–106)
CHOLEST SERPL-MCNC: 122 MG/DL (ref 100–199)
CO2 SERPL-SCNC: 21 MMOL/L (ref 20–29)
CREAT SERPL-MCNC: 0.77 MG/DL (ref 0.57–1)
CREAT UR-MCNC: 55.7 MG/DL
EGFRCR SERPLBLD CKD-EPI 2021: 92 ML/MIN/1.73
EOSINOPHIL # BLD AUTO: 0.3 X10E3/UL (ref 0–0.4)
EOSINOPHIL NFR BLD AUTO: 3 %
ERYTHROCYTE [DISTWIDTH] IN BLOOD BY AUTOMATED COUNT: 12.9 % (ref 11.7–15.4)
GLOBULIN SER CALC-MCNC: 2.7 G/DL (ref 1.5–4.5)
GLUCOSE SERPL-MCNC: 88 MG/DL (ref 70–99)
HBA1C MFR BLD: 6.3 % (ref 4.8–5.6)
HCT VFR BLD AUTO: 49 % (ref 34–46.6)
HCV IGG SERPL QL IA: NON REACTIVE
HDLC SERPL-MCNC: 35 MG/DL
HGB BLD-MCNC: 16.2 G/DL (ref 11.1–15.9)
IMM GRANULOCYTES # BLD AUTO: 0 X10E3/UL (ref 0–0.1)
IMM GRANULOCYTES NFR BLD AUTO: 0 %
LDLC SERPL CALC-MCNC: 60 MG/DL (ref 0–99)
LYMPHOCYTES # BLD AUTO: 3.4 X10E3/UL (ref 0.7–3.1)
LYMPHOCYTES NFR BLD AUTO: 32 %
MCH RBC QN AUTO: 31 PG (ref 26.6–33)
MCHC RBC AUTO-ENTMCNC: 33.1 G/DL (ref 31.5–35.7)
MCV RBC AUTO: 94 FL (ref 79–97)
MICROALBUMIN UR-MCNC: <3 UG/ML
MONOCYTES # BLD AUTO: 0.7 X10E3/UL (ref 0.1–0.9)
MONOCYTES NFR BLD AUTO: 7 %
NEUTROPHILS # BLD AUTO: 6.1 X10E3/UL (ref 1.4–7)
NEUTROPHILS NFR BLD AUTO: 57 %
PLATELET # BLD AUTO: 305 X10E3/UL (ref 150–450)
POTASSIUM SERPL-SCNC: 4.4 MMOL/L (ref 3.5–5.2)
PROT SERPL-MCNC: 7.1 G/DL (ref 6–8.5)
RBC # BLD AUTO: 5.23 X10E6/UL (ref 3.77–5.28)
SODIUM SERPL-SCNC: 142 MMOL/L (ref 134–144)
TRIGL SERPL-MCNC: 154 MG/DL (ref 0–149)
TSH SERPL DL<=0.005 MIU/L-ACNC: 0.66 UIU/ML (ref 0.45–4.5)
VIT B12 SERPL-MCNC: 1267 PG/ML (ref 232–1245)
VLDLC SERPL CALC-MCNC: 27 MG/DL (ref 5–40)
WBC # BLD AUTO: 10.6 X10E3/UL (ref 3.4–10.8)

## 2025-07-03 DIAGNOSIS — T78.40XD ALLERGY, SUBSEQUENT ENCOUNTER: ICD-10-CM

## 2025-07-03 DIAGNOSIS — N95.2 VAGINAL ATROPHY: Primary | ICD-10-CM

## 2025-07-03 DIAGNOSIS — J40 BRONCHITIS: ICD-10-CM

## 2025-07-03 DIAGNOSIS — J45.31 MILD PERSISTENT ASTHMA WITH ACUTE EXACERBATION: ICD-10-CM

## 2025-07-03 RX ORDER — ESTRADIOL 0.1 MG/G
2 CREAM VAGINAL 2 TIMES WEEKLY
Qty: 42.5 G | Refills: 11 | Status: SHIPPED | OUTPATIENT
Start: 2025-07-03

## 2025-07-03 RX ORDER — AZITHROMYCIN 250 MG/1
TABLET, FILM COATED ORAL
Qty: 6 TABLET | Refills: 0 | Status: SHIPPED | OUTPATIENT
Start: 2025-07-03

## 2025-07-03 RX ORDER — DEXTROMETHORPHAN HYDROBROMIDE AND PROMETHAZINE HYDROCHLORIDE 15; 6.25 MG/5ML; MG/5ML
5 SYRUP ORAL 4 TIMES DAILY PRN
Qty: 240 ML | Refills: 0 | Status: SHIPPED | OUTPATIENT
Start: 2025-07-03

## 2025-07-03 RX ORDER — LEVOCETIRIZINE DIHYDROCHLORIDE 5 MG/1
5 TABLET, FILM COATED ORAL EVERY EVENING
Qty: 90 TABLET | Refills: 3 | Status: SHIPPED | OUTPATIENT
Start: 2025-07-03

## (undated) DEVICE — CANN SMPL SOFTECH BIFLO ETCO2 A/M 7FT

## (undated) DEVICE — SINGLE-USE BIOPSY FORCEPS: Brand: RADIAL JAW 4